# Patient Record
Sex: FEMALE | Race: WHITE | Employment: OTHER
[De-identification: names, ages, dates, MRNs, and addresses within clinical notes are randomized per-mention and may not be internally consistent; named-entity substitution may affect disease eponyms.]

---

## 2017-02-17 PROBLEM — G47.00 INSOMNIA: Status: ACTIVE | Noted: 2017-02-17

## 2017-02-17 PROBLEM — E78.9 DISORDER OF LIPID METABOLISM: Status: ACTIVE | Noted: 2017-02-17

## 2017-02-17 PROBLEM — M89.9 DISORDER OF BONE AND ARTICULAR CARTILAGE: Status: ACTIVE | Noted: 2017-02-17

## 2017-02-17 PROBLEM — M12.9 ARTHROPATHIA: Status: ACTIVE | Noted: 2017-02-17

## 2017-02-17 PROBLEM — R32 URINARY INCONTINENCE: Status: ACTIVE | Noted: 2017-02-17

## 2017-02-17 PROBLEM — I10 BP (HIGH BLOOD PRESSURE): Status: ACTIVE | Noted: 2017-02-17

## 2017-02-17 PROBLEM — M10.9 GOUTY ARTHROPATHY: Status: ACTIVE | Noted: 2017-02-17

## 2017-02-17 PROBLEM — I83.90 VARICOSE VEINS OF LOWER EXTREMITY: Status: ACTIVE | Noted: 2017-02-17

## 2017-02-17 PROBLEM — D75.89 INCREASED MCV: Status: ACTIVE | Noted: 2017-02-17

## 2017-02-17 PROBLEM — R39.15 URGENCY OF URINATION: Status: ACTIVE | Noted: 2017-02-17

## 2017-02-17 PROBLEM — M51.36 DDD (DEGENERATIVE DISC DISEASE), LUMBAR: Status: ACTIVE | Noted: 2017-02-17

## 2017-02-17 PROBLEM — Q15.9 GLAUCOMA ASSOCIATED WITH ANTERIOR SEGMENT ANOMALY: Status: ACTIVE | Noted: 2017-02-17

## 2017-02-17 PROBLEM — I10 BENIGN ESSENTIAL HTN: Status: ACTIVE | Noted: 2017-02-17

## 2017-02-17 PROBLEM — D64.9 ANEMIA: Status: ACTIVE | Noted: 2017-02-17

## 2017-02-17 PROBLEM — R73.01 ELEVATED FASTING BLOOD SUGAR: Status: ACTIVE | Noted: 2017-02-17

## 2017-02-17 PROBLEM — E03.9 HYPOACTIVE THYROID: Status: ACTIVE | Noted: 2017-02-17

## 2017-02-17 PROBLEM — H40.89 GLAUCOMA ASSOCIATED WITH ANTERIOR SEGMENT ANOMALY: Status: ACTIVE | Noted: 2017-02-17

## 2017-02-17 PROBLEM — E87.1 HYPONATREMIA: Status: ACTIVE | Noted: 2017-02-17

## 2017-02-17 PROBLEM — N95.1 MENOPAUSAL SYMPTOM: Status: ACTIVE | Noted: 2017-02-17

## 2017-02-17 PROBLEM — M10.9 GOUT: Status: ACTIVE | Noted: 2017-02-17

## 2017-02-17 PROBLEM — R06.81 APNEA: Status: ACTIVE | Noted: 2017-02-17

## 2017-02-17 PROBLEM — G47.33 OBSTRUCTIVE APNEA: Status: ACTIVE | Noted: 2017-02-17

## 2017-02-17 PROBLEM — M54.17 LUMBOSACRAL NEURITIS: Status: ACTIVE | Noted: 2017-02-17

## 2017-02-17 PROBLEM — N18.9 CHRONIC RENAL IMPAIRMENT: Status: ACTIVE | Noted: 2017-02-17

## 2017-02-17 PROBLEM — M19.049 ARTHRITIS, DEGENERATIVE, LOCALIZED, PRIMARY, HAND: Status: ACTIVE | Noted: 2017-02-17

## 2017-02-17 PROBLEM — M94.9 DISORDER OF BONE AND ARTICULAR CARTILAGE: Status: ACTIVE | Noted: 2017-02-17

## 2017-02-17 PROBLEM — D50.9 IRON DEFICIENCY ANEMIA: Status: ACTIVE | Noted: 2017-02-17

## 2017-02-17 PROBLEM — R60.9 EDEMA: Status: ACTIVE | Noted: 2017-02-17

## 2017-02-17 PROBLEM — M19.91 PRIMARY LOCALIZED OSTEOARTHRITIS: Status: ACTIVE | Noted: 2017-02-17

## 2017-06-06 ENCOUNTER — HOSPITAL ENCOUNTER (OUTPATIENT)
Dept: PHYSICAL THERAPY | Facility: CLINIC | Age: 77
Setting detail: THERAPIES SERIES
Discharge: HOME | End: 2017-06-06
Payer: MEDICARE

## 2017-06-06 PROCEDURE — G8979 MOBILITY GOAL STATUS: HCPCS

## 2017-06-06 PROCEDURE — 97110 THERAPEUTIC EXERCISES: CPT

## 2017-06-06 PROCEDURE — 97161 PT EVAL LOW COMPLEX 20 MIN: CPT

## 2017-06-06 PROCEDURE — G0283 ELEC STIM OTHER THAN WOUND: HCPCS

## 2017-06-06 PROCEDURE — G8978 MOBILITY CURRENT STATUS: HCPCS

## 2017-06-08 ENCOUNTER — HOSPITAL ENCOUNTER (OUTPATIENT)
Dept: PHYSICAL THERAPY | Facility: CLINIC | Age: 77
Setting detail: THERAPIES SERIES
Discharge: HOME | End: 2017-06-08
Payer: MEDICARE

## 2017-06-08 PROCEDURE — G0283 ELEC STIM OTHER THAN WOUND: HCPCS

## 2017-06-08 PROCEDURE — 97110 THERAPEUTIC EXERCISES: CPT

## 2017-06-13 ENCOUNTER — HOSPITAL ENCOUNTER (OUTPATIENT)
Dept: PHYSICAL THERAPY | Facility: CLINIC | Age: 77
Setting detail: THERAPIES SERIES
Discharge: HOME | End: 2017-06-13
Payer: MEDICARE

## 2017-06-13 PROCEDURE — G0283 ELEC STIM OTHER THAN WOUND: HCPCS

## 2017-06-13 PROCEDURE — 97161 PT EVAL LOW COMPLEX 20 MIN: CPT

## 2017-06-13 PROCEDURE — 97140 MANUAL THERAPY 1/> REGIONS: CPT

## 2017-06-13 PROCEDURE — 97110 THERAPEUTIC EXERCISES: CPT

## 2017-06-15 ENCOUNTER — HOSPITAL ENCOUNTER (OUTPATIENT)
Dept: PHYSICAL THERAPY | Facility: CLINIC | Age: 77
Setting detail: THERAPIES SERIES
Discharge: HOME | End: 2017-06-15
Payer: MEDICARE

## 2017-06-15 PROCEDURE — G0283 ELEC STIM OTHER THAN WOUND: HCPCS

## 2017-06-15 PROCEDURE — 97110 THERAPEUTIC EXERCISES: CPT

## 2017-06-15 PROCEDURE — 97140 MANUAL THERAPY 1/> REGIONS: CPT

## 2017-06-20 ENCOUNTER — HOSPITAL ENCOUNTER (OUTPATIENT)
Dept: PHYSICAL THERAPY | Facility: CLINIC | Age: 77
Setting detail: THERAPIES SERIES
Discharge: HOME | End: 2017-06-20
Payer: MEDICARE

## 2017-06-20 PROCEDURE — 97110 THERAPEUTIC EXERCISES: CPT

## 2017-06-20 PROCEDURE — 97140 MANUAL THERAPY 1/> REGIONS: CPT

## 2017-06-20 PROCEDURE — G0283 ELEC STIM OTHER THAN WOUND: HCPCS

## 2017-06-22 ENCOUNTER — HOSPITAL ENCOUNTER (OUTPATIENT)
Dept: PHYSICAL THERAPY | Facility: CLINIC | Age: 77
Setting detail: THERAPIES SERIES
Discharge: HOME | End: 2017-06-22
Payer: MEDICARE

## 2017-06-22 PROCEDURE — 97110 THERAPEUTIC EXERCISES: CPT

## 2017-06-22 PROCEDURE — G0283 ELEC STIM OTHER THAN WOUND: HCPCS

## 2017-06-22 PROCEDURE — 97140 MANUAL THERAPY 1/> REGIONS: CPT

## 2017-06-27 ENCOUNTER — HOSPITAL ENCOUNTER (OUTPATIENT)
Dept: PHYSICAL THERAPY | Facility: CLINIC | Age: 77
Setting detail: THERAPIES SERIES
Discharge: HOME | End: 2017-06-27
Payer: MEDICARE

## 2017-06-27 PROCEDURE — 97110 THERAPEUTIC EXERCISES: CPT

## 2017-06-27 PROCEDURE — G0283 ELEC STIM OTHER THAN WOUND: HCPCS

## 2017-06-27 PROCEDURE — 97140 MANUAL THERAPY 1/> REGIONS: CPT

## 2017-06-30 ENCOUNTER — HOSPITAL ENCOUNTER (OUTPATIENT)
Dept: PHYSICAL THERAPY | Facility: CLINIC | Age: 77
Setting detail: THERAPIES SERIES
Discharge: HOME | End: 2017-06-30
Payer: MEDICARE

## 2017-06-30 PROCEDURE — 97110 THERAPEUTIC EXERCISES: CPT

## 2017-06-30 PROCEDURE — G0283 ELEC STIM OTHER THAN WOUND: HCPCS

## 2017-06-30 PROCEDURE — 97140 MANUAL THERAPY 1/> REGIONS: CPT

## 2017-07-06 ENCOUNTER — HOSPITAL ENCOUNTER (OUTPATIENT)
Dept: PHYSICAL THERAPY | Facility: CLINIC | Age: 77
Setting detail: THERAPIES SERIES
Discharge: HOME | End: 2017-07-06
Payer: MEDICARE

## 2017-07-06 PROCEDURE — G0283 ELEC STIM OTHER THAN WOUND: HCPCS

## 2017-07-06 PROCEDURE — 97110 THERAPEUTIC EXERCISES: CPT

## 2017-07-06 PROCEDURE — 97140 MANUAL THERAPY 1/> REGIONS: CPT

## 2017-07-11 ENCOUNTER — HOSPITAL ENCOUNTER (OUTPATIENT)
Dept: PHYSICAL THERAPY | Facility: CLINIC | Age: 77
Setting detail: THERAPIES SERIES
Discharge: HOME | End: 2017-07-11
Payer: MEDICARE

## 2017-07-11 PROCEDURE — G8978 MOBILITY CURRENT STATUS: HCPCS

## 2017-07-11 PROCEDURE — G0283 ELEC STIM OTHER THAN WOUND: HCPCS

## 2017-07-11 PROCEDURE — 97140 MANUAL THERAPY 1/> REGIONS: CPT

## 2017-07-11 PROCEDURE — 97110 THERAPEUTIC EXERCISES: CPT

## 2017-07-11 PROCEDURE — G8979 MOBILITY GOAL STATUS: HCPCS

## 2017-07-18 ENCOUNTER — HOSPITAL ENCOUNTER (OUTPATIENT)
Dept: PHYSICAL THERAPY | Facility: CLINIC | Age: 77
Setting detail: THERAPIES SERIES
Discharge: HOME | End: 2017-07-18
Payer: MEDICARE

## 2017-07-18 PROCEDURE — 97110 THERAPEUTIC EXERCISES: CPT

## 2017-07-18 PROCEDURE — G0283 ELEC STIM OTHER THAN WOUND: HCPCS

## 2017-07-25 ENCOUNTER — HOSPITAL ENCOUNTER (OUTPATIENT)
Dept: PHYSICAL THERAPY | Facility: CLINIC | Age: 77
Setting detail: THERAPIES SERIES
Discharge: HOME | End: 2017-07-25
Payer: MEDICARE

## 2017-07-25 PROCEDURE — 97530 THERAPEUTIC ACTIVITIES: CPT

## 2017-07-25 PROCEDURE — G8979 MOBILITY GOAL STATUS: HCPCS

## 2017-07-25 PROCEDURE — 97110 THERAPEUTIC EXERCISES: CPT

## 2017-07-25 PROCEDURE — G8980 MOBILITY D/C STATUS: HCPCS

## 2017-08-08 ENCOUNTER — HOSPITAL ENCOUNTER (OUTPATIENT)
Age: 77
Discharge: HOME | End: 2017-08-08
Payer: MEDICARE

## 2017-08-08 LAB
ALBUMIN SERPL-MCNC: 4.3 G/DL (ref 3.5–5.2)
ALBUMIN/GLOBULIN RATIO: NORMAL (ref 1–2.5)
ALP BLD-CCNC: 83 U/L (ref 35–104)
ALT SERPL-CCNC: 25 U/L (ref 5–33)
ANION GAP SERPL CALCULATED.3IONS-SCNC: 13 MMOL/L (ref 9–17)
AST SERPL-CCNC: 31 U/L
BILIRUB SERPL-MCNC: 0.52 MG/DL (ref 0.3–1.2)
BILIRUBIN DIRECT: 0.12 MG/DL
BILIRUBIN, INDIRECT: 0.4 MG/DL (ref 0–1)
BUN BLDV-MCNC: 22 MG/DL (ref 8–23)
CHLORIDE BLD-SCNC: 102 MMOL/L (ref 98–107)
CHOLESTEROL, TOTAL: 145 MG/DL
CHOLESTEROL/HDL RATIO: 2.2
CHOLESTEROL/HDL RATIO: 2.2
CHOLESTEROL: 143 MG/DL
CO2: 27 MMOL/L (ref 20–31)
CREAT SERPL-MCNC: 0.7 MG/DL (ref 0.5–0.9)
GFR AFRICAN AMERICAN: >60 ML/MIN
GFR NON-AFRICAN AMERICAN: >60 ML/MIN
GFR SERPL CREATININE-BSD FRML MDRD: NORMAL ML/MIN/{1.73_M2}
GFR SERPL CREATININE-BSD FRML MDRD: NORMAL ML/MIN/{1.73_M2}
GLOBULIN: NORMAL G/DL (ref 1.5–3.8)
GLUCOSE BLD-MCNC: 82 MG/DL
HDLC SERPL-MCNC: 65 MG/DL
HDLC SERPL-MCNC: 65 MG/DL (ref 35–70)
LDL CHOLESTEROL CALCULATED: 72 MG/DL (ref 0–160)
LDL CHOLESTEROL: 70 MG/DL (ref 0–130)
POTASSIUM SERPL-SCNC: 4.7 MMOL/L (ref 3.7–5.3)
SODIUM BLD-SCNC: 142 MMOL/L (ref 135–144)
THYROXINE, FREE: 1.11 NG/DL (ref 0.93–1.7)
TOTAL PROTEIN: 7.1 G/DL (ref 6.4–8.3)
TRIGL SERPL-MCNC: 40 MG/DL
TRIGL SERPL-MCNC: 41 MG/DL
TSH SERPL DL<=0.05 MIU/L-ACNC: 3.52 MIU/L (ref 0.3–5)
URIC ACID: 4.4 MG/DL (ref 2.4–5.7)
VLDLC SERPL CALC-MCNC: NORMAL MG/DL
VLDLC SERPL CALC-MCNC: NORMAL MG/DL (ref 1–30)

## 2017-08-08 PROCEDURE — 84520 ASSAY OF UREA NITROGEN: CPT

## 2017-08-08 PROCEDURE — 80061 LIPID PANEL: CPT

## 2017-08-08 PROCEDURE — 84550 ASSAY OF BLOOD/URIC ACID: CPT

## 2017-08-08 PROCEDURE — 82565 ASSAY OF CREATININE: CPT

## 2017-08-08 PROCEDURE — 80051 ELECTROLYTE PANEL: CPT

## 2017-08-08 PROCEDURE — 84439 ASSAY OF FREE THYROXINE: CPT

## 2017-08-08 PROCEDURE — 84443 ASSAY THYROID STIM HORMONE: CPT

## 2017-08-08 PROCEDURE — 80076 HEPATIC FUNCTION PANEL: CPT

## 2018-06-20 ENCOUNTER — OFFICE VISIT (OUTPATIENT)
Dept: ORTHOPEDIC SURGERY | Age: 78
End: 2018-06-20
Payer: MEDICARE

## 2018-06-20 VITALS
WEIGHT: 203 LBS | DIASTOLIC BLOOD PRESSURE: 61 MMHG | SYSTOLIC BLOOD PRESSURE: 119 MMHG | BODY MASS INDEX: 33.82 KG/M2 | HEIGHT: 65 IN | HEART RATE: 66 BPM

## 2018-06-20 DIAGNOSIS — G89.29 CHRONIC PAIN OF RIGHT KNEE: Primary | ICD-10-CM

## 2018-06-20 DIAGNOSIS — M25.561 CHRONIC PAIN OF RIGHT KNEE: Primary | ICD-10-CM

## 2018-06-20 PROCEDURE — 1090F PRES/ABSN URINE INCON ASSESS: CPT | Performed by: ORTHOPAEDIC SURGERY

## 2018-06-20 PROCEDURE — 4040F PNEUMOC VAC/ADMIN/RCVD: CPT | Performed by: ORTHOPAEDIC SURGERY

## 2018-06-20 PROCEDURE — 99213 OFFICE O/P EST LOW 20 MIN: CPT | Performed by: ORTHOPAEDIC SURGERY

## 2018-06-20 PROCEDURE — 1036F TOBACCO NON-USER: CPT | Performed by: ORTHOPAEDIC SURGERY

## 2018-06-20 PROCEDURE — G8427 DOCREV CUR MEDS BY ELIG CLIN: HCPCS | Performed by: ORTHOPAEDIC SURGERY

## 2018-06-20 PROCEDURE — G8400 PT W/DXA NO RESULTS DOC: HCPCS | Performed by: ORTHOPAEDIC SURGERY

## 2018-06-20 PROCEDURE — 1123F ACP DISCUSS/DSCN MKR DOCD: CPT | Performed by: ORTHOPAEDIC SURGERY

## 2018-06-20 PROCEDURE — G8419 CALC BMI OUT NRM PARAM NOF/U: HCPCS | Performed by: ORTHOPAEDIC SURGERY

## 2018-06-20 RX ORDER — DIPHENHYDRAMINE HCL 25 MG
25 CAPSULE ORAL NIGHTLY PRN
COMMUNITY

## 2018-06-20 ASSESSMENT — KOOS JR
STANDING UPRIGHT: 1
RISING FROM SITTING: 3
HOW SEVERE IS YOUR KNEE STIFFNESS AFTER FIRST WAKING IN MORNING: 4
STRAIGHTENING KNEE FULLY: 1
GOING UP OR DOWN STAIRS: 2
BENDING TO THE FLOOR TO PICK UP OBJECT: 2
TWISING OR PIVOTING ON KNEE: 2

## 2018-06-20 ASSESSMENT — PROMIS GLOBAL HEALTH SCALE
IN GENERAL, WOULD YOU SAY YOUR HEALTH IS...[ON A SCALE OF 1 (POOR) TO 5 (EXCELLENT)]: 5
IN GENERAL, HOW WOULD YOU RATE YOUR PHYSICAL HEALTH [ON A SCALE OF 1 (POOR) TO 5 (EXCELLENT)]?: 3
TO WHAT EXTENT ARE YOU ABLE TO CARRY OUT YOUR EVERYDAY PHYSICAL ACTIVITIES SUCH AS WALKING, CLIMBING STAIRS, CARRYING GROCERIES, OR MOVING A CHAIR [ON A SCALE OF 1 (NOT AT ALL) TO 5 (COMPLETELY)]?: 3
IN GENERAL, WOULD YOU SAY YOUR QUALITY OF LIFE IS...[ON A SCALE OF 1 (POOR) TO 5 (EXCELLENT)]: 3
SUM OF RESPONSES TO QUESTIONS 2, 4, 5, & 10: 16
SUM OF RESPONSES TO QUESTIONS 3, 6, 7, & 8: 15
HOW IS THE PROMIS V1.1 BEING ADMINISTERED?: 0
IN THE PAST 7 DAYS, HOW WOULD YOU RATE YOUR PAIN ON AVERAGE [ON A SCALE FROM 0 (NO PAIN) TO 10 (WORST IMAGINABLE PAIN)]?: 7
IN THE PAST 7 DAYS, HOW OFTEN HAVE YOU BEEN BOTHERED BY EMOTIONAL PROBLEMS, SUCH AS FEELING ANXIOUS, DEPRESSED, OR IRRITABLE [ON A SCALE FROM 1 (NEVER) TO 5 (ALWAYS)]?: 3
IN GENERAL, PLEASE RATE HOW WELL YOU CARRY OUT YOUR USUAL SOCIAL ACTIVITIES (INCLUDES ACTIVITIES AT HOME, AT WORK, AND IN YOUR COMMUNITY, AND RESPONSIBILITIES AS A PARENT, CHILD, SPOUSE, EMPLOYEE, FRIEND, ETC) [ON A SCALE OF 1 (POOR) TO 5 (EXCELLENT)]?: 5
IN GENERAL, HOW WOULD YOU RATE YOUR MENTAL HEALTH, INCLUDING YOUR MOOD AND YOUR ABILITY TO THINK [ON A SCALE OF 1 (POOR) TO 5 (EXCELLENT)]?: 5
IN THE PAST 7 DAYS, HOW WOULD YOU RATE YOUR FATIGUE ON AVERAGE [ON A SCALE FROM 1 (NONE) TO 5 (VERY SEVERE)]?: 2
IN GENERAL, HOW WOULD YOU RATE YOUR SATISFACTION WITH YOUR SOCIAL ACTIVITIES AND RELATIONSHIPS [ON A SCALE OF 1 (POOR) TO 5 (EXCELLENT)]?: 5
WHO IS THE PERSON COMPLETING THE PROMIS V1.1 SURVEY?: 0

## 2018-06-29 ASSESSMENT — PROMIS GLOBAL HEALTH SCALE
HOW IS THE PROMIS V1.1 BEING ADMINISTERED?: 2
IN THE PAST 7 DAYS, HOW WOULD YOU RATE YOUR PAIN ON AVERAGE [ON A SCALE FROM 0 (NO PAIN) TO 10 (WORST IMAGINABLE PAIN)]?: 7
IN GENERAL, HOW WOULD YOU RATE YOUR PHYSICAL HEALTH [ON A SCALE OF 1 (POOR) TO 5 (EXCELLENT)]?: 4
IN THE PAST 7 DAYS, HOW WOULD YOU RATE YOUR FATIGUE ON AVERAGE [ON A SCALE FROM 1 (NONE) TO 5 (VERY SEVERE)]?: 5
WHO IS THE PERSON COMPLETING THE PROMIS V1.1 SURVEY?: 0
IN GENERAL, HOW WOULD YOU RATE YOUR SATISFACTION WITH YOUR SOCIAL ACTIVITIES AND RELATIONSHIPS [ON A SCALE OF 1 (POOR) TO 5 (EXCELLENT)]?: 3
IN GENERAL, WOULD YOU SAY YOUR QUALITY OF LIFE IS...[ON A SCALE OF 1 (POOR) TO 5 (EXCELLENT)]: 2
IN GENERAL, PLEASE RATE HOW WELL YOU CARRY OUT YOUR USUAL SOCIAL ACTIVITIES (INCLUDES ACTIVITIES AT HOME, AT WORK, AND IN YOUR COMMUNITY, AND RESPONSIBILITIES AS A PARENT, CHILD, SPOUSE, EMPLOYEE, FRIEND, ETC) [ON A SCALE OF 1 (POOR) TO 5 (EXCELLENT)]?: 4
IN GENERAL, WOULD YOU SAY YOUR HEALTH IS...[ON A SCALE OF 1 (POOR) TO 5 (EXCELLENT)]: 4
IN THE PAST 7 DAYS, HOW OFTEN HAVE YOU BEEN BOTHERED BY EMOTIONAL PROBLEMS, SUCH AS FEELING ANXIOUS, DEPRESSED, OR IRRITABLE [ON A SCALE FROM 1 (NEVER) TO 5 (ALWAYS)]?: 3
TO WHAT EXTENT ARE YOU ABLE TO CARRY OUT YOUR EVERYDAY PHYSICAL ACTIVITIES SUCH AS WALKING, CLIMBING STAIRS, CARRYING GROCERIES, OR MOVING A CHAIR [ON A SCALE OF 1 (NOT AT ALL) TO 5 (COMPLETELY)]?: 4
IN GENERAL, HOW WOULD YOU RATE YOUR MENTAL HEALTH, INCLUDING YOUR MOOD AND YOUR ABILITY TO THINK [ON A SCALE OF 1 (POOR) TO 5 (EXCELLENT)]?: 5

## 2018-06-29 ASSESSMENT — KOOS JR
STANDING UPRIGHT: 2
HOW SEVERE IS YOUR KNEE STIFFNESS AFTER FIRST WAKING IN MORNING: 2
STRAIGHTENING KNEE FULLY: 0
BENDING TO THE FLOOR TO PICK UP OBJECT: 0
GOING UP OR DOWN STAIRS: 2
RISING FROM SITTING: 3
TWISING OR PIVOTING ON KNEE: 3

## 2018-07-03 ENCOUNTER — HOSPITAL ENCOUNTER (OUTPATIENT)
Dept: PREADMISSION TESTING | Age: 78
Discharge: HOME | End: 2018-07-07
Payer: MEDICARE

## 2018-07-03 VITALS
SYSTOLIC BLOOD PRESSURE: 125 MMHG | RESPIRATION RATE: 16 BRPM | DIASTOLIC BLOOD PRESSURE: 56 MMHG | BODY MASS INDEX: 35.79 KG/M2 | OXYGEN SATURATION: 98 % | HEIGHT: 63 IN | WEIGHT: 202 LBS | TEMPERATURE: 97.8 F

## 2018-07-03 LAB
-: ABNORMAL
ABSOLUTE EOS #: 0.2 K/UL (ref 0–0.4)
ABSOLUTE IMMATURE GRANULOCYTE: ABNORMAL K/UL (ref 0–0.3)
ABSOLUTE LYMPH #: 2 K/UL (ref 1–4.8)
ABSOLUTE MONO #: 0.8 K/UL (ref 0.1–1.3)
AMORPHOUS: ABNORMAL
ANION GAP SERPL CALCULATED.3IONS-SCNC: 12 MMOL/L (ref 9–17)
BACTERIA: ABNORMAL
BASOPHILS # BLD: 1 % (ref 0–2)
BASOPHILS ABSOLUTE: 0.1 K/UL (ref 0–0.2)
BILIRUBIN URINE: NEGATIVE
BUN BLDV-MCNC: 20 MG/DL (ref 8–23)
BUN/CREAT BLD: NORMAL (ref 9–20)
CALCIUM SERPL-MCNC: 10.3 MG/DL (ref 8.6–10.4)
CASTS UA: ABNORMAL /LPF
CHLORIDE BLD-SCNC: 99 MMOL/L (ref 98–107)
CO2: 29 MMOL/L (ref 20–31)
COLOR: YELLOW
COMMENT UA: ABNORMAL
CREAT SERPL-MCNC: 0.79 MG/DL (ref 0.5–0.9)
CRYSTALS, UA: ABNORMAL /HPF
DIFFERENTIAL TYPE: ABNORMAL
EKG ATRIAL RATE: 163 BPM
EKG P AXIS: 81 DEGREES
EKG Q-T INTERVAL: 366 MS
EKG QRS DURATION: 88 MS
EKG QTC CALCULATION (BAZETT): 490 MS
EKG R AXIS: -13 DEGREES
EKG T AXIS: 67 DEGREES
EKG VENTRICULAR RATE: 108 BPM
EOSINOPHILS RELATIVE PERCENT: 3 % (ref 0–4)
EPITHELIAL CELLS UA: ABNORMAL /HPF
GFR AFRICAN AMERICAN: >60 ML/MIN
GFR NON-AFRICAN AMERICAN: >60 ML/MIN
GFR SERPL CREATININE-BSD FRML MDRD: NORMAL ML/MIN/{1.73_M2}
GFR SERPL CREATININE-BSD FRML MDRD: NORMAL ML/MIN/{1.73_M2}
GLUCOSE BLD-MCNC: 83 MG/DL (ref 70–99)
GLUCOSE URINE: NEGATIVE
HCT VFR BLD CALC: 40.4 % (ref 36–46)
HEMOGLOBIN: 13.5 G/DL (ref 12–16)
IMMATURE GRANULOCYTES: ABNORMAL %
KETONES, URINE: NEGATIVE
LEUKOCYTE ESTERASE, URINE: ABNORMAL
LYMPHOCYTES # BLD: 29 % (ref 24–44)
MCH RBC QN AUTO: 34.5 PG (ref 26–34)
MCHC RBC AUTO-ENTMCNC: 33.4 G/DL (ref 31–37)
MCV RBC AUTO: 103.4 FL (ref 80–100)
MONOCYTES # BLD: 11 % (ref 1–7)
MUCUS: ABNORMAL
NITRITE, URINE: NEGATIVE
NRBC AUTOMATED: ABNORMAL PER 100 WBC
OTHER OBSERVATIONS UA: ABNORMAL
PDW BLD-RTO: 14.4 % (ref 11.5–14.9)
PH UA: 6 (ref 5–8)
PLATELET # BLD: 214 K/UL (ref 150–450)
PLATELET ESTIMATE: ABNORMAL
PMV BLD AUTO: 8.3 FL (ref 6–12)
POTASSIUM SERPL-SCNC: 5.2 MMOL/L (ref 3.7–5.3)
PROTEIN UA: NEGATIVE
RBC # BLD: 3.9 M/UL (ref 4–5.2)
RBC # BLD: ABNORMAL 10*6/UL
RBC UA: ABNORMAL /HPF
RENAL EPITHELIAL, UA: ABNORMAL /HPF
SEG NEUTROPHILS: 56 % (ref 36–66)
SEGMENTED NEUTROPHILS ABSOLUTE COUNT: 3.7 K/UL (ref 1.3–9.1)
SODIUM BLD-SCNC: 140 MMOL/L (ref 135–144)
SPECIFIC GRAVITY UA: 1.02 (ref 1–1.03)
TRICHOMONAS: ABNORMAL
TURBIDITY: CLEAR
URINE HGB: NEGATIVE
UROBILINOGEN, URINE: NORMAL
WBC # BLD: 6.7 K/UL (ref 3.5–11)
WBC # BLD: ABNORMAL 10*3/UL
WBC UA: ABNORMAL /HPF
YEAST: ABNORMAL

## 2018-07-03 PROCEDURE — 80048 BASIC METABOLIC PNL TOTAL CA: CPT

## 2018-07-03 PROCEDURE — 93005 ELECTROCARDIOGRAM TRACING: CPT

## 2018-07-03 PROCEDURE — 87086 URINE CULTURE/COLONY COUNT: CPT

## 2018-07-03 PROCEDURE — 81001 URINALYSIS AUTO W/SCOPE: CPT

## 2018-07-03 PROCEDURE — 36415 COLL VENOUS BLD VENIPUNCTURE: CPT

## 2018-07-03 PROCEDURE — 87641 MR-STAPH DNA AMP PROBE: CPT

## 2018-07-03 PROCEDURE — 85025 COMPLETE CBC W/AUTO DIFF WBC: CPT

## 2018-07-03 RX ORDER — OXYBUTYNIN CHLORIDE 15 MG/1
15 TABLET, EXTENDED RELEASE ORAL DAILY
Status: ON HOLD | COMMUNITY
End: 2021-10-21

## 2018-07-03 RX ORDER — LISINOPRIL 5 MG/1
5 TABLET ORAL DAILY
COMMUNITY
End: 2019-01-18 | Stop reason: ALTCHOICE

## 2018-07-03 NOTE — H&P
HISTORY and [address]       NAME:  Sabrina Chua  MRN: [id number]   YOB: 1940   Date: 7/3/2018   Age: 69 y.o. Gender: female     COMPLAINT AND PRESENT HISTORY:     Sabrina Chua is a 69 y.o.  female, undergoing preadmission testing for right total knee arthroplasty. Patient complaining of pain in the right knee for the past few years, progressively worsening, more severe over past 2-3 months. Pain is described as an ache which occasionally can become sharp (8/10), without radiation. Pain is worse with all types of activity, worse with stairs. Pain alleviated with anti-inflammatories, steroid injections in the past, last injection only provided 2 weeks relief. The knee will occasionally buckle and give way under the patient. No recent falls or trauma. Patient denies weakness or numbness in right lower extremity. History of left TKA, no complications postoperatively. Patient otherwise feeling well, no recent fevers/chills, chest pain, shortness of breath. No history of venous thrombosis.      PAST MEDICAL HISTORY     Past Medical History:   Diagnosis Date    Arthritis     Asthma     Back pain     Caffeine use     2-3 cups coffee and 2-3 cups tea and 1-2 cans pop daily    Carpal tunnel syndrome     Constipation     Gout     History of blood transfusion     Hypertension     Obesity     Osteoarthritis     PONV (postoperative nausea and vomiting)     Sleep apnea     Thyroid disease     HYPOTHYROID    Urinary incontinence     Urine incontinence     Wears hearing aid     BILATERAL     Pt denies any history of:  Diabetes Mellitus, Hyperlipidemia, CAD, Stroke/TIA, COPD, GERD/PUD, Kidney Disease, Cancer, Seizures, Hepatitis, Tuberculosis    SURGICAL HISTORY       Past Surgical History:   Procedure Laterality Date    CARPAL TUNNEL RELEASE Bilateral     CATARACT REMOVAL Bilateral     COLONOSCOPY      CYSTOSCOPY      and dilation    FOOT SURGERY Right     HYSTERECTOMY      JOINT REPLACEMENT Left 2011    TKA    KNEE ARTHROSCOPY Left     KNEE SURGERY Left     TONSILLECTOMY       FAMILY HISTORY       Family History   Problem Relation Age of Onset    Heart Failure Mother     Heart Disease Father      SOCIAL HISTORY       Social History     Social History    Marital status:      Spouse name: N/A    Number of children: 2    Years of education: N/A     Occupational History    Retired      Social History Main Topics    Smoking status: Former Smoker    Smokeless tobacco: Never Used    Alcohol use Yes      Comment: rare    Drug use: No    Sexual activity: No     Other Topics Concern    None     Social History Narrative    None     REVIEW OF SYSTEMS      Allergies   Allergen Reactions    Povidone-Iodine Rash    Hydrochlorothiazide     Meperidine Itching    Naproxen Hives     Current Outpatient Prescriptions on File Prior to Encounter   Medication Sig Dispense Refill    Cinnamon 500 MG TABS Take by mouth daily      diphenhydrAMINE (BENADRYL) 25 MG capsule Take 25 mg by mouth nightly      levothyroxine (SYNTHROID) 88 MCG tablet Take 88 mcg by mouth Daily      allopurinol (ZYLOPRIM) 300 MG tablet Take 300 mg by mouth daily      celecoxib (CELEBREX) 100 MG capsule Take 100 mg by mouth daily       Polyethylene Glycol (PEG EX) Apply topically      Coenzyme Q10 (COQ10) 100 MG CAPS Take by mouth daily       Resveratrol 100 MG CAPS Take by mouth      Ascorbic Acid (VITAMIN C) 500 MG/5ML LIQD Take by mouth      Cholecalciferol (VITAMIN D3) 2000 UNITS CAPS Take by mouth      aspirin 81 MG tablet Take 81 mg by mouth every other day Patient takes Aspirin every other night at bedtime      Omega-3 Fatty Acids (OMEGA-3 FISH OIL) 1200 MG CAPS Take by mouth      vitamin E 400 UNIT capsule Take 400 Units by mouth daily      B Complex-C (SUPER B COMPLEX/VITAMIN C PO) Take by mouth      Calcium Carb-Cholecalciferol (CALCIUM 600 + D PO) Take by mouth      Multiple Vitamin (MULTI VITAMIN DAILY PO) Take by mouth      NIACINAMIDE PO Take by mouth      traMADol (ULTRAM) 50 MG tablet Take 50 mg by mouth every 6 hours as needed for Pain      Glucosamine-Chondroit-Vit C-Mn (GLUCOSAMINE 1500 COMPLEX PO) Take by mouth      TURMERIC PO Take by mouth      vitamin B-12 (CYANOCOBALAMIN) 1000 MCG tablet Take 1,000 mcg by mouth daily      mupirocin (BACTROBAN) 2 % ointment Place small amount in each nares (nostril) as directed, twice a day, for 10 days prior to surgery. 1 Tube 0    ketoconazole (NIZORAL) 2 % shampoo Apply topically daily as needed for Itching Apply topically daily as needed. No current facility-administered medications on file prior to encounter. General health:  Patient feeling well. No fever or chills. Skin:  No itching, redness or rash. HEENT:  No headache, rhinorrhea, nasal congestion or sore throat. Hearing aids. Reading glasses. Neck:  No pain, stiffness or masses. Cardiovascular/Respiratory system:  No chest pain, palpitations, cough or shortness of breath. Gastrointestinal tract: No abdominal pain, nausea, vomiting, diarrhea or constipation. Genitourinary:  No dysuria, hesitancy, urgency, frequency or discoloration of urine. Urge incontinence. Locomotor:  See HPI. Neuropsychiatric:  No referable complaints. GENERAL PHYSICAL EXAM:     Vitals: BP (!) 125/56   Temp 97.8 °F (36.6 °C) (Oral)   Resp 16   Ht 5' 3\" (1.6 m)   Wt 202 lb (91.6 kg)   LMP  (Approximate)   SpO2 98%   BMI 35.78 kg/m²  Body mass index is 35.78 kg/m². GENERAL APPEARANCE: Rubén Ashby is a 69 y.o.  female, moderately obese, nourished, conscious, alert. Does not appear to be in any distress or pain at this time. SKIN:  Normal temperature, turgor and texture. No cyanosis or jaundice. HEAD:  Normocephalic, atraumatic. Linford Solo EYES:  Pupils equal, reactive to light and accomodation. Conjunctiva clear, no pallor. THROAT:  Mucous membranes moist. No tonsillar erythema or exudates. NECK:  No stiffness, trachea central.  No palpable masses. CHEST:  Symmetrical and equal on expansion. HEART:  Regular rate, rhythm. No murmurs. LUNGS:  Equal on expansion. Clear to auscultation with no adventitious sounds. ABDOMEN:  Moderately obese. Soft on palpation. No localized tenderness, guarding or rigidity. No palpable organomegaly. LYMPHATICS:  No palpable cervical lymphadenopathy. LOCOMOTOR, BACK AND SPINE:  No tenderness or deformities. No flank tenderness. Walker for stability. EXTREMITIES:  Lower extremities are symmetrical with minimal nonpitting pretibial/pedal edema. No discoloration or ulcerations. No warmth, tenderness, erythema noted in lower legs bilaterally. Significant crepitus noted with flexion/extension of the right knee. Range of motion is normal. No motor or sensory deficit appreciated. Peripheral pulses 2+ bilaterally. NEUROLOGIC:  The patient is conscious, alert, oriented. No apparent focal sensory or motor deficits. Cranial nerve exam reveals no deficits.                                                                        PROVISIONAL DIAGNOSES / SURGERY:      DJD, Right Knee  Total Knee Arthroplasty, Right    Patient Active Problem List    Diagnosis Date Noted    Urgency of urination 02/17/2017    Anemia 02/17/2017    Apnea 02/17/2017    Arthropathia 02/17/2017    Benign essential HTN 02/17/2017    Chronic renal impairment 02/17/2017    DDD (degenerative disc disease), lumbar 02/17/2017    Disorder of bone and articular cartilage 02/17/2017    Disorder of lipid metabolism 02/17/2017    Edema 02/17/2017    Glaucoma associated with anterior segment anomaly 02/17/2017    Gout 02/17/2017    Gouty arthropathy 02/17/2017    BP (high blood pressure) 02/17/2017    Hyponatremia 02/17/2017    Hypoactive thyroid 02/17/2017    Elevated fasting blood sugar 02/17/2017    Insomnia 02/17/2017    Iron deficiency anemia 02/17/2017    Primary localized osteoarthritis 02/17/2017    Arthritis, degenerative, localized, primary, hand 02/17/2017    Lumbosacral neuritis 02/17/2017    Increased MCV 02/17/2017    Menopausal symptom 02/17/2017    Obstructive apnea 02/17/2017    Urinary incontinence 02/17/2017    Varicose veins of lower extremity 02/17/2017    Enthesopathy of hip 12/11/2009    Shortness of breath 04/21/2009    Essential (primary) hypertension 02/17/2009    Inflammation of sacroiliac joint ([address] 01/23/2008    Osteoporosis 09/14/2007    Arthritis of knee, degenerative 07/26/2006    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

## 2018-07-03 NOTE — FLOWSHEET NOTE
Reviewed patient's medical history and EKG with Dr. Arty Paget. Cardiac clearance requested per Dr. Arty Paget.

## 2018-07-04 ENCOUNTER — ANESTHESIA EVENT (OUTPATIENT)
Dept: OPERATING ROOM | Age: 78
DRG: 470 | End: 2018-07-04
Payer: MEDICARE

## 2018-07-04 LAB
CULTURE: NO GROWTH
Lab: NORMAL
MRSA, DNA, NASAL: NORMAL
SPECIMEN DESCRIPTION: NORMAL
SPECIMEN DESCRIPTION: NORMAL
STATUS: NORMAL

## 2018-07-04 RX ORDER — SODIUM CHLORIDE 0.9 % (FLUSH) 0.9 %
10 SYRINGE (ML) INJECTION EVERY 12 HOURS SCHEDULED
Status: CANCELLED | OUTPATIENT
Start: 2018-07-04

## 2018-07-04 RX ORDER — SODIUM CHLORIDE, SODIUM LACTATE, POTASSIUM CHLORIDE, CALCIUM CHLORIDE 600; 310; 30; 20 MG/100ML; MG/100ML; MG/100ML; MG/100ML
INJECTION, SOLUTION INTRAVENOUS CONTINUOUS
Status: CANCELLED | OUTPATIENT
Start: 2018-07-04

## 2018-07-04 RX ORDER — SCOLOPAMINE TRANSDERMAL SYSTEM 1 MG/1
1 PATCH, EXTENDED RELEASE TRANSDERMAL
Status: CANCELLED | OUTPATIENT
Start: 2018-07-04 | End: 2018-07-07

## 2018-07-04 RX ORDER — SODIUM CHLORIDE 0.9 % (FLUSH) 0.9 %
10 SYRINGE (ML) INJECTION PRN
Status: CANCELLED | OUTPATIENT
Start: 2018-07-04

## 2018-07-04 RX ORDER — LIDOCAINE HYDROCHLORIDE 10 MG/ML
1 INJECTION, SOLUTION EPIDURAL; INFILTRATION; INTRACAUDAL; PERINEURAL
Status: CANCELLED | OUTPATIENT
Start: 2018-07-04 | End: 2018-07-04

## 2018-07-17 ENCOUNTER — ANESTHESIA (OUTPATIENT)
Dept: OPERATING ROOM | Age: 78
DRG: 470 | End: 2018-07-17
Payer: MEDICARE

## 2018-07-17 ENCOUNTER — APPOINTMENT (OUTPATIENT)
Dept: GENERAL RADIOLOGY | Age: 78
DRG: 470 | End: 2018-07-17
Attending: ORTHOPAEDIC SURGERY
Payer: MEDICARE

## 2018-07-17 ENCOUNTER — HOSPITAL ENCOUNTER (INPATIENT)
Age: 78
LOS: 1 days | Discharge: NURSING, REHAB OR LONG-TERM CARE | DRG: 470 | End: 2018-07-18
Attending: ORTHOPAEDIC SURGERY | Admitting: ORTHOPAEDIC SURGERY
Payer: MEDICARE

## 2018-07-17 VITALS — SYSTOLIC BLOOD PRESSURE: 162 MMHG | DIASTOLIC BLOOD PRESSURE: 77 MMHG | OXYGEN SATURATION: 97 % | TEMPERATURE: 96.1 F

## 2018-07-17 DIAGNOSIS — M17.11 PRIMARY OSTEOARTHRITIS OF RIGHT KNEE: Primary | ICD-10-CM

## 2018-07-17 PROCEDURE — 6360000002 HC RX W HCPCS: Performed by: ORTHOPAEDIC SURGERY

## 2018-07-17 PROCEDURE — 2720000010 HC SURG SUPPLY STERILE: Performed by: ORTHOPAEDIC SURGERY

## 2018-07-17 PROCEDURE — 27447 TOTAL KNEE ARTHROPLASTY: CPT | Performed by: ORTHOPAEDIC SURGERY

## 2018-07-17 PROCEDURE — 3700000000 HC ANESTHESIA ATTENDED CARE: Performed by: ORTHOPAEDIC SURGERY

## 2018-07-17 PROCEDURE — 97161 PT EVAL LOW COMPLEX 20 MIN: CPT

## 2018-07-17 PROCEDURE — 7100000000 HC PACU RECOVERY - FIRST 15 MIN: Performed by: ORTHOPAEDIC SURGERY

## 2018-07-17 PROCEDURE — 0SRC0J9 REPLACEMENT OF RIGHT KNEE JOINT WITH SYNTHETIC SUBSTITUTE, CEMENTED, OPEN APPROACH: ICD-10-PCS | Performed by: ORTHOPAEDIC SURGERY

## 2018-07-17 PROCEDURE — 6360000002 HC RX W HCPCS: Performed by: ANESTHESIOLOGY

## 2018-07-17 PROCEDURE — 94664 DEMO&/EVAL PT USE INHALER: CPT

## 2018-07-17 PROCEDURE — G8979 MOBILITY GOAL STATUS: HCPCS

## 2018-07-17 PROCEDURE — C1713 ANCHOR/SCREW BN/BN,TIS/BN: HCPCS | Performed by: ORTHOPAEDIC SURGERY

## 2018-07-17 PROCEDURE — 97530 THERAPEUTIC ACTIVITIES: CPT

## 2018-07-17 PROCEDURE — 2500000003 HC RX 250 WO HCPCS: Performed by: ORTHOPAEDIC SURGERY

## 2018-07-17 PROCEDURE — G8978 MOBILITY CURRENT STATUS: HCPCS

## 2018-07-17 PROCEDURE — 97165 OT EVAL LOW COMPLEX 30 MIN: CPT

## 2018-07-17 PROCEDURE — 3E0T3BZ INTRODUCTION OF ANESTHETIC AGENT INTO PERIPHERAL NERVES AND PLEXI, PERCUTANEOUS APPROACH: ICD-10-PCS | Performed by: ANESTHESIOLOGY

## 2018-07-17 PROCEDURE — 7100000001 HC PACU RECOVERY - ADDTL 15 MIN: Performed by: ORTHOPAEDIC SURGERY

## 2018-07-17 PROCEDURE — 3600000014 HC SURGERY LEVEL 4 ADDTL 15MIN: Performed by: ORTHOPAEDIC SURGERY

## 2018-07-17 PROCEDURE — 3600000004 HC SURGERY LEVEL 4 BASE: Performed by: ORTHOPAEDIC SURGERY

## 2018-07-17 PROCEDURE — 2500000003 HC RX 250 WO HCPCS: Performed by: ANESTHESIOLOGY

## 2018-07-17 PROCEDURE — 1200000000 HC SEMI PRIVATE

## 2018-07-17 PROCEDURE — 6370000000 HC RX 637 (ALT 250 FOR IP): Performed by: ORTHOPAEDIC SURGERY

## 2018-07-17 PROCEDURE — 94640 AIRWAY INHALATION TREATMENT: CPT

## 2018-07-17 PROCEDURE — 2580000003 HC RX 258: Performed by: ANESTHESIOLOGY

## 2018-07-17 PROCEDURE — C1776 JOINT DEVICE (IMPLANTABLE): HCPCS | Performed by: ORTHOPAEDIC SURGERY

## 2018-07-17 PROCEDURE — 2580000003 HC RX 258: Performed by: ORTHOPAEDIC SURGERY

## 2018-07-17 PROCEDURE — 76942 ECHO GUIDE FOR BIOPSY: CPT | Performed by: ANESTHESIOLOGY

## 2018-07-17 PROCEDURE — 73560 X-RAY EXAM OF KNEE 1 OR 2: CPT

## 2018-07-17 PROCEDURE — 6360000002 HC RX W HCPCS

## 2018-07-17 PROCEDURE — 2709999900 HC NON-CHARGEABLE SUPPLY: Performed by: ORTHOPAEDIC SURGERY

## 2018-07-17 PROCEDURE — 6370000000 HC RX 637 (ALT 250 FOR IP): Performed by: INTERNAL MEDICINE

## 2018-07-17 PROCEDURE — 97535 SELF CARE MNGMENT TRAINING: CPT

## 2018-07-17 PROCEDURE — 3700000001 HC ADD 15 MINUTES (ANESTHESIA): Performed by: ORTHOPAEDIC SURGERY

## 2018-07-17 DEVICE — TRAY TIB L71MM KNEE CO CHROM FIN MOD INTLOK VANGUARD: Type: IMPLANTABLE DEVICE | Site: KNEE | Status: FUNCTIONAL

## 2018-07-17 DEVICE — IMPLANTABLE DEVICE: Type: IMPLANTABLE DEVICE | Site: KNEE | Status: FUNCTIONAL

## 2018-07-17 DEVICE — COMPONENT PAT DIA34MM THK7.8MM THN KNEE POLY 3 PEG SER A: Type: IMPLANTABLE DEVICE | Site: KNEE | Status: FUNCTIONAL

## 2018-07-17 DEVICE — DISCONTINUED USE 416978 CEMENT PALACOS R SING DOSE 40GR: Type: IMPLANTABLE DEVICE | Site: KNEE | Status: FUNCTIONAL

## 2018-07-17 DEVICE — BEARING TIB L71MM THK10MM KNEE ARCM ANT STBL MOD COMPLT: Type: IMPLANTABLE DEVICE | Site: KNEE | Status: FUNCTIONAL

## 2018-07-17 RX ORDER — HYDROCODONE BITARTRATE AND ACETAMINOPHEN 5; 325 MG/1; MG/1
1 TABLET ORAL PRN
Status: DISCONTINUED | OUTPATIENT
Start: 2018-07-17 | End: 2018-07-17 | Stop reason: HOSPADM

## 2018-07-17 RX ORDER — FENTANYL CITRATE 50 UG/ML
INJECTION, SOLUTION INTRAMUSCULAR; INTRAVENOUS PRN
Status: DISCONTINUED | OUTPATIENT
Start: 2018-07-17 | End: 2018-07-17 | Stop reason: SDUPTHER

## 2018-07-17 RX ORDER — BUPIVACAINE HYDROCHLORIDE 2.5 MG/ML
INJECTION, SOLUTION EPIDURAL; INFILTRATION; INTRACAUDAL PRN
Status: DISCONTINUED | OUTPATIENT
Start: 2018-07-17 | End: 2018-07-17 | Stop reason: SDUPTHER

## 2018-07-17 RX ORDER — LEVOTHYROXINE SODIUM 88 UG/1
88 TABLET ORAL DAILY
Status: DISCONTINUED | OUTPATIENT
Start: 2018-07-17 | End: 2018-07-18 | Stop reason: HOSPADM

## 2018-07-17 RX ORDER — DEXAMETHASONE SODIUM PHOSPHATE 10 MG/ML
10 INJECTION INTRAMUSCULAR; INTRAVENOUS ONCE
Status: COMPLETED | OUTPATIENT
Start: 2018-07-17 | End: 2018-07-17

## 2018-07-17 RX ORDER — GABAPENTIN 300 MG/1
300 CAPSULE ORAL ONCE
Status: COMPLETED | OUTPATIENT
Start: 2018-07-17 | End: 2018-07-17

## 2018-07-17 RX ORDER — ONDANSETRON 2 MG/ML
INJECTION INTRAMUSCULAR; INTRAVENOUS PRN
Status: DISCONTINUED | OUTPATIENT
Start: 2018-07-17 | End: 2018-07-17 | Stop reason: SDUPTHER

## 2018-07-17 RX ORDER — ALLOPURINOL 300 MG/1
300 TABLET ORAL NIGHTLY
Status: DISCONTINUED | OUTPATIENT
Start: 2018-07-17 | End: 2018-07-18 | Stop reason: HOSPADM

## 2018-07-17 RX ORDER — SODIUM CHLORIDE 0.9 % (FLUSH) 0.9 %
10 SYRINGE (ML) INJECTION PRN
Status: DISCONTINUED | OUTPATIENT
Start: 2018-07-17 | End: 2018-07-18 | Stop reason: HOSPADM

## 2018-07-17 RX ORDER — MIDAZOLAM HYDROCHLORIDE 1 MG/ML
INJECTION INTRAMUSCULAR; INTRAVENOUS PRN
Status: DISCONTINUED | OUTPATIENT
Start: 2018-07-17 | End: 2018-07-17 | Stop reason: SDUPTHER

## 2018-07-17 RX ORDER — FENTANYL CITRATE 50 UG/ML
25 INJECTION, SOLUTION INTRAMUSCULAR; INTRAVENOUS EVERY 5 MIN PRN
Status: DISCONTINUED | OUTPATIENT
Start: 2018-07-17 | End: 2018-07-17 | Stop reason: HOSPADM

## 2018-07-17 RX ORDER — SCOLOPAMINE TRANSDERMAL SYSTEM 1 MG/1
1 PATCH, EXTENDED RELEASE TRANSDERMAL ONCE
Status: DISCONTINUED | OUTPATIENT
Start: 2018-07-17 | End: 2018-07-18 | Stop reason: HOSPADM

## 2018-07-17 RX ORDER — ALBUTEROL SULFATE 2.5 MG/3ML
SOLUTION RESPIRATORY (INHALATION)
Status: COMPLETED
Start: 2018-07-17 | End: 2018-07-17

## 2018-07-17 RX ORDER — TRANEXAMIC ACID 100 MG/ML
INJECTION, SOLUTION INTRAVENOUS PRN
Status: DISCONTINUED | OUTPATIENT
Start: 2018-07-17 | End: 2018-07-17 | Stop reason: SDUPTHER

## 2018-07-17 RX ORDER — OXYCODONE HYDROCHLORIDE 5 MG/1
10 TABLET ORAL EVERY 4 HOURS PRN
Status: DISCONTINUED | OUTPATIENT
Start: 2018-07-17 | End: 2018-07-18 | Stop reason: HOSPADM

## 2018-07-17 RX ORDER — CALCIUM CHLORIDE 100 MG/ML
INJECTION INTRAVENOUS; INTRAVENTRICULAR PRN
Status: DISCONTINUED | OUTPATIENT
Start: 2018-07-17 | End: 2018-07-17 | Stop reason: HOSPADM

## 2018-07-17 RX ORDER — SODIUM CHLORIDE 0.9 % (FLUSH) 0.9 %
10 SYRINGE (ML) INJECTION EVERY 12 HOURS SCHEDULED
Status: DISCONTINUED | OUTPATIENT
Start: 2018-07-17 | End: 2018-07-18 | Stop reason: HOSPADM

## 2018-07-17 RX ORDER — CEFAZOLIN SODIUM 1 G/3ML
INJECTION, POWDER, FOR SOLUTION INTRAMUSCULAR; INTRAVENOUS
Status: DISPENSED
Start: 2018-07-17 | End: 2018-07-17

## 2018-07-17 RX ORDER — SODIUM CHLORIDE 0.9 % (FLUSH) 0.9 %
10 SYRINGE (ML) INJECTION PRN
Status: DISCONTINUED | OUTPATIENT
Start: 2018-07-17 | End: 2018-07-17 | Stop reason: HOSPADM

## 2018-07-17 RX ORDER — LANOLIN ALCOHOL/MO/W.PET/CERES
1000 CREAM (GRAM) TOPICAL DAILY
Status: DISCONTINUED | OUTPATIENT
Start: 2018-07-17 | End: 2018-07-17 | Stop reason: SDUPTHER

## 2018-07-17 RX ORDER — ACETAMINOPHEN 325 MG/1
650 TABLET ORAL ONCE
Status: COMPLETED | OUTPATIENT
Start: 2018-07-17 | End: 2018-07-17

## 2018-07-17 RX ORDER — LISINOPRIL 5 MG/1
5 TABLET ORAL DAILY
Status: DISCONTINUED | OUTPATIENT
Start: 2018-07-17 | End: 2018-07-17 | Stop reason: SDUPTHER

## 2018-07-17 RX ORDER — GABAPENTIN 600 MG/1
300 TABLET ORAL ONCE
Status: DISCONTINUED | OUTPATIENT
Start: 2018-07-17 | End: 2018-07-17 | Stop reason: CLARIF

## 2018-07-17 RX ORDER — SODIUM CHLORIDE 0.9 % (FLUSH) 0.9 %
10 SYRINGE (ML) INJECTION EVERY 12 HOURS SCHEDULED
Status: DISCONTINUED | OUTPATIENT
Start: 2018-07-17 | End: 2018-07-17 | Stop reason: HOSPADM

## 2018-07-17 RX ORDER — ALLOPURINOL 300 MG/1
300 TABLET ORAL DAILY
Status: DISCONTINUED | OUTPATIENT
Start: 2018-07-17 | End: 2018-07-17 | Stop reason: SDUPTHER

## 2018-07-17 RX ORDER — LIDOCAINE HYDROCHLORIDE 10 MG/ML
1 INJECTION, SOLUTION EPIDURAL; INFILTRATION; INTRACAUDAL; PERINEURAL
Status: DISCONTINUED | OUTPATIENT
Start: 2018-07-17 | End: 2018-07-17 | Stop reason: HOSPADM

## 2018-07-17 RX ORDER — LANOLIN ALCOHOL/MO/W.PET/CERES
1000 CREAM (GRAM) TOPICAL NIGHTLY
Status: DISCONTINUED | OUTPATIENT
Start: 2018-07-17 | End: 2018-07-18

## 2018-07-17 RX ORDER — ROCURONIUM BROMIDE 10 MG/ML
INJECTION, SOLUTION INTRAVENOUS PRN
Status: DISCONTINUED | OUTPATIENT
Start: 2018-07-17 | End: 2018-07-17 | Stop reason: SDUPTHER

## 2018-07-17 RX ORDER — PROPOFOL 10 MG/ML
INJECTION, EMULSION INTRAVENOUS PRN
Status: DISCONTINUED | OUTPATIENT
Start: 2018-07-17 | End: 2018-07-17 | Stop reason: SDUPTHER

## 2018-07-17 RX ORDER — MORPHINE SULFATE 2 MG/ML
2 INJECTION, SOLUTION INTRAMUSCULAR; INTRAVENOUS EVERY 5 MIN PRN
Status: DISCONTINUED | OUTPATIENT
Start: 2018-07-17 | End: 2018-07-17 | Stop reason: HOSPADM

## 2018-07-17 RX ORDER — PROMETHAZINE HYDROCHLORIDE 25 MG/ML
6.25 INJECTION, SOLUTION INTRAMUSCULAR; INTRAVENOUS
Status: DISCONTINUED | OUTPATIENT
Start: 2018-07-17 | End: 2018-07-17 | Stop reason: HOSPADM

## 2018-07-17 RX ORDER — SCOLOPAMINE TRANSDERMAL SYSTEM 1 MG/1
1 PATCH, EXTENDED RELEASE TRANSDERMAL
Status: DISCONTINUED | OUTPATIENT
Start: 2018-07-17 | End: 2018-07-17 | Stop reason: HOSPADM

## 2018-07-17 RX ORDER — ONDANSETRON 2 MG/ML
4 INJECTION INTRAMUSCULAR; INTRAVENOUS EVERY 6 HOURS PRN
Status: DISCONTINUED | OUTPATIENT
Start: 2018-07-17 | End: 2018-07-18 | Stop reason: HOSPADM

## 2018-07-17 RX ORDER — DIPHENHYDRAMINE HYDROCHLORIDE 50 MG/ML
12.5 INJECTION INTRAMUSCULAR; INTRAVENOUS
Status: DISCONTINUED | OUTPATIENT
Start: 2018-07-17 | End: 2018-07-17 | Stop reason: HOSPADM

## 2018-07-17 RX ORDER — OXYCODONE HYDROCHLORIDE 5 MG/1
5 TABLET ORAL EVERY 4 HOURS PRN
Status: DISCONTINUED | OUTPATIENT
Start: 2018-07-17 | End: 2018-07-18 | Stop reason: HOSPADM

## 2018-07-17 RX ORDER — HYDROCODONE BITARTRATE AND ACETAMINOPHEN 5; 325 MG/1; MG/1
2 TABLET ORAL PRN
Qty: 50 TABLET | Refills: 0 | Status: SHIPPED | OUTPATIENT
Start: 2018-07-17 | End: 2018-07-24

## 2018-07-17 RX ORDER — 0.9 % SODIUM CHLORIDE 0.9 %
500 INTRAVENOUS SOLUTION INTRAVENOUS
Status: DISCONTINUED | OUTPATIENT
Start: 2018-07-17 | End: 2018-07-17 | Stop reason: HOSPADM

## 2018-07-17 RX ORDER — DOCUSATE SODIUM 100 MG/1
100 CAPSULE, LIQUID FILLED ORAL 2 TIMES DAILY
Status: DISCONTINUED | OUTPATIENT
Start: 2018-07-17 | End: 2018-07-18 | Stop reason: HOSPADM

## 2018-07-17 RX ORDER — LISINOPRIL 5 MG/1
5 TABLET ORAL NIGHTLY
Status: DISCONTINUED | OUTPATIENT
Start: 2018-07-17 | End: 2018-07-18

## 2018-07-17 RX ORDER — SODIUM CHLORIDE, SODIUM LACTATE, POTASSIUM CHLORIDE, CALCIUM CHLORIDE 600; 310; 30; 20 MG/100ML; MG/100ML; MG/100ML; MG/100ML
INJECTION, SOLUTION INTRAVENOUS CONTINUOUS
Status: DISCONTINUED | OUTPATIENT
Start: 2018-07-17 | End: 2018-07-18 | Stop reason: HOSPADM

## 2018-07-17 RX ORDER — HYDRALAZINE HYDROCHLORIDE 20 MG/ML
5 INJECTION INTRAMUSCULAR; INTRAVENOUS EVERY 10 MIN PRN
Status: DISCONTINUED | OUTPATIENT
Start: 2018-07-17 | End: 2018-07-17 | Stop reason: HOSPADM

## 2018-07-17 RX ORDER — ACETAMINOPHEN 500 MG
1000 TABLET ORAL EVERY 8 HOURS SCHEDULED
Status: DISCONTINUED | OUTPATIENT
Start: 2018-07-17 | End: 2018-07-18 | Stop reason: HOSPADM

## 2018-07-17 RX ORDER — HYDROCODONE BITARTRATE AND ACETAMINOPHEN 5; 325 MG/1; MG/1
2 TABLET ORAL PRN
Status: DISCONTINUED | OUTPATIENT
Start: 2018-07-17 | End: 2018-07-17 | Stop reason: HOSPADM

## 2018-07-17 RX ORDER — EPHEDRINE SULFATE 50 MG/ML
INJECTION, SOLUTION INTRAVENOUS PRN
Status: DISCONTINUED | OUTPATIENT
Start: 2018-07-17 | End: 2018-07-17 | Stop reason: SDUPTHER

## 2018-07-17 RX ORDER — KETOROLAC TROMETHAMINE 30 MG/ML
15 INJECTION, SOLUTION INTRAMUSCULAR; INTRAVENOUS EVERY 8 HOURS SCHEDULED
Status: DISCONTINUED | OUTPATIENT
Start: 2018-07-17 | End: 2018-07-18 | Stop reason: HOSPADM

## 2018-07-17 RX ADMIN — Medication 750 ML: at 12:09

## 2018-07-17 RX ADMIN — Medication 2 G: at 18:39

## 2018-07-17 RX ADMIN — TRANEXAMIC ACID 1000 MG: 100 INJECTION, SOLUTION INTRAVENOUS at 10:14

## 2018-07-17 RX ADMIN — EPHEDRINE SULFATE 10 MG: 50 INJECTION INTRAMUSCULAR; INTRAVENOUS; SUBCUTANEOUS at 10:20

## 2018-07-17 RX ADMIN — ROCURONIUM BROMIDE 20 MG: 10 INJECTION INTRAVENOUS at 10:06

## 2018-07-17 RX ADMIN — ACETAMINOPHEN 1000 MG: 500 TABLET, FILM COATED ORAL at 15:11

## 2018-07-17 RX ADMIN — ONDANSETRON 4 MG: 2 INJECTION INTRAMUSCULAR; INTRAVENOUS at 11:17

## 2018-07-17 RX ADMIN — EPHEDRINE SULFATE 10 MG: 50 INJECTION INTRAMUSCULAR; INTRAVENOUS; SUBCUTANEOUS at 11:14

## 2018-07-17 RX ADMIN — ASPIRIN 325 MG: 325 TABLET, DELAYED RELEASE ORAL at 15:11

## 2018-07-17 RX ADMIN — DEXAMETHASONE SODIUM PHOSPHATE 10 MG: 10 INJECTION INTRAMUSCULAR; INTRAVENOUS at 08:28

## 2018-07-17 RX ADMIN — Medication 2 G: at 10:00

## 2018-07-17 RX ADMIN — PROPOFOL 17 MG: 10 INJECTION, EMULSION INTRAVENOUS at 10:05

## 2018-07-17 RX ADMIN — LISINOPRIL 5 MG: 5 TABLET ORAL at 21:02

## 2018-07-17 RX ADMIN — HYDROMORPHONE HYDROCHLORIDE 1 MG: 1 INJECTION, SOLUTION INTRAMUSCULAR; INTRAVENOUS; SUBCUTANEOUS at 10:35

## 2018-07-17 RX ADMIN — SODIUM CHLORIDE, POTASSIUM CHLORIDE, SODIUM LACTATE AND CALCIUM CHLORIDE: 600; 310; 30; 20 INJECTION, SOLUTION INTRAVENOUS at 13:37

## 2018-07-17 RX ADMIN — CYANOCOBALAMIN TAB 1000 MCG 1000 MCG: 1000 TAB at 21:01

## 2018-07-17 RX ADMIN — KETOROLAC TROMETHAMINE 15 MG: 30 INJECTION, SOLUTION INTRAMUSCULAR at 15:12

## 2018-07-17 RX ADMIN — KETOROLAC TROMETHAMINE 15 MG: 30 INJECTION, SOLUTION INTRAMUSCULAR at 22:34

## 2018-07-17 RX ADMIN — EPHEDRINE SULFATE 10 MG: 50 INJECTION INTRAMUSCULAR; INTRAVENOUS; SUBCUTANEOUS at 11:19

## 2018-07-17 RX ADMIN — ALBUTEROL SULFATE 2.5 MG: 2.5 SOLUTION RESPIRATORY (INHALATION) at 08:51

## 2018-07-17 RX ADMIN — MIDAZOLAM 2 MG: 1 INJECTION INTRAMUSCULAR; INTRAVENOUS at 09:16

## 2018-07-17 RX ADMIN — SODIUM CHLORIDE, POTASSIUM CHLORIDE, SODIUM LACTATE AND CALCIUM CHLORIDE: 600; 310; 30; 20 INJECTION, SOLUTION INTRAVENOUS at 22:33

## 2018-07-17 RX ADMIN — BUPIVACAINE HYDROCHLORIDE 30 ML: 2.5 INJECTION, SOLUTION EPIDURAL; INFILTRATION; INTRACAUDAL; PERINEURAL at 09:33

## 2018-07-17 RX ADMIN — ACETAMINOPHEN 650 MG: 325 TABLET, FILM COATED ORAL at 08:29

## 2018-07-17 RX ADMIN — DOCUSATE SODIUM 100 MG: 100 CAPSULE, LIQUID FILLED ORAL at 21:01

## 2018-07-17 RX ADMIN — FENTANYL CITRATE 100 MCG: 50 INJECTION, SOLUTION INTRAMUSCULAR; INTRAVENOUS at 10:29

## 2018-07-17 RX ADMIN — ASPIRIN 325 MG: 325 TABLET, DELAYED RELEASE ORAL at 21:01

## 2018-07-17 RX ADMIN — ALBUTEROL SULFATE 2.5 MG: 2.5 SOLUTION RESPIRATORY (INHALATION) at 12:21

## 2018-07-17 RX ADMIN — ACETAMINOPHEN 1000 MG: 500 TABLET, FILM COATED ORAL at 22:33

## 2018-07-17 RX ADMIN — SODIUM CHLORIDE, POTASSIUM CHLORIDE, SODIUM LACTATE AND CALCIUM CHLORIDE: 600; 310; 30; 20 INJECTION, SOLUTION INTRAVENOUS at 08:28

## 2018-07-17 RX ADMIN — GABAPENTIN 300 MG: 300 CAPSULE ORAL at 08:34

## 2018-07-17 RX ADMIN — OXYBUTYNIN CHLORIDE 15 MG: 10 TABLET, FILM COATED, EXTENDED RELEASE ORAL at 21:00

## 2018-07-17 RX ADMIN — ALLOPURINOL 300 MG: 300 TABLET ORAL at 21:00

## 2018-07-17 RX ADMIN — DOCUSATE SODIUM 100 MG: 100 CAPSULE, LIQUID FILLED ORAL at 15:10

## 2018-07-17 ASSESSMENT — PULMONARY FUNCTION TESTS
PIF_VALUE: 20
PIF_VALUE: 21
PIF_VALUE: 4
PIF_VALUE: 21
PIF_VALUE: 16
PIF_VALUE: 2
PIF_VALUE: 20
PIF_VALUE: 17
PIF_VALUE: 20
PIF_VALUE: 20
PIF_VALUE: 11
PIF_VALUE: 20
PIF_VALUE: 19
PIF_VALUE: 21
PIF_VALUE: 19
PIF_VALUE: 2
PIF_VALUE: 17
PIF_VALUE: 20
PIF_VALUE: 19
PIF_VALUE: 17
PIF_VALUE: 20
PIF_VALUE: 17
PIF_VALUE: 20
PIF_VALUE: 21
PIF_VALUE: 2
PIF_VALUE: 21
PIF_VALUE: 21
PIF_VALUE: 19
PIF_VALUE: 19
PIF_VALUE: 21
PIF_VALUE: 22
PIF_VALUE: 17
PIF_VALUE: 19
PIF_VALUE: 22
PIF_VALUE: 20
PIF_VALUE: 20
PIF_VALUE: 18
PIF_VALUE: 20
PIF_VALUE: 2
PIF_VALUE: 18
PIF_VALUE: 20
PIF_VALUE: 18
PIF_VALUE: 2
PIF_VALUE: 20
PIF_VALUE: 4
PIF_VALUE: 18
PIF_VALUE: 15
PIF_VALUE: 19
PIF_VALUE: 18
PIF_VALUE: 2
PIF_VALUE: 20
PIF_VALUE: 17
PIF_VALUE: 19
PIF_VALUE: 1
PIF_VALUE: 18
PIF_VALUE: 20
PIF_VALUE: 1
PIF_VALUE: 19
PIF_VALUE: 20
PIF_VALUE: 18
PIF_VALUE: 7
PIF_VALUE: 20
PIF_VALUE: 21
PIF_VALUE: 20
PIF_VALUE: 21
PIF_VALUE: 19
PIF_VALUE: 24
PIF_VALUE: 20
PIF_VALUE: 21
PIF_VALUE: 19
PIF_VALUE: 20
PIF_VALUE: 2
PIF_VALUE: 32
PIF_VALUE: 19
PIF_VALUE: 20
PIF_VALUE: 19
PIF_VALUE: 21
PIF_VALUE: 19
PIF_VALUE: 21
PIF_VALUE: 28
PIF_VALUE: 17
PIF_VALUE: 18
PIF_VALUE: 17
PIF_VALUE: 19
PIF_VALUE: 20
PIF_VALUE: 20
PIF_VALUE: 18
PIF_VALUE: 20
PIF_VALUE: 19
PIF_VALUE: 20
PIF_VALUE: 19
PIF_VALUE: 20
PIF_VALUE: 2
PIF_VALUE: 20
PIF_VALUE: 22

## 2018-07-17 ASSESSMENT — PAIN DESCRIPTION - ORIENTATION: ORIENTATION: RIGHT

## 2018-07-17 ASSESSMENT — PAIN SCALES - GENERAL
PAINLEVEL_OUTOF10: 1
PAINLEVEL_OUTOF10: 0
PAINLEVEL_OUTOF10: 0
PAINLEVEL_OUTOF10: 4
PAINLEVEL_OUTOF10: 3
PAINLEVEL_OUTOF10: 4

## 2018-07-17 ASSESSMENT — PAIN DESCRIPTION - PAIN TYPE: TYPE: SURGICAL PAIN

## 2018-07-17 ASSESSMENT — PAIN DESCRIPTION - DESCRIPTORS
DESCRIPTORS: ACHING
DESCRIPTORS: ACHING

## 2018-07-17 ASSESSMENT — ENCOUNTER SYMPTOMS: SHORTNESS OF BREATH: 1

## 2018-07-17 ASSESSMENT — PAIN DESCRIPTION - LOCATION: LOCATION: KNEE

## 2018-07-17 ASSESSMENT — PAIN - FUNCTIONAL ASSESSMENT: PAIN_FUNCTIONAL_ASSESSMENT: 0-10

## 2018-07-17 NOTE — ANESTHESIA PROCEDURE NOTES
Peripheral Block    Start time: 7/17/2018 9:15 AM  End time: 7/17/2018 9:39 AM  Staffing  Anesthesiologist: Jw Carson  Performed: anesthesiologist   Preanesthetic Checklist  Completed: patient identified, site marked, surgical consent, pre-op evaluation, timeout performed, IV checked, risks and benefits discussed, monitors and equipment checked, anesthesia consent given, oxygen available and patient being monitored  Peripheral Block  Patient position: supine  Prep: ChloraPrep  Patient monitoring: cardiac monitor, continuous pulse ox, continuous capnometry and IV access  Block type: Femoral  Laterality: right  Injection technique: catheter  Procedures: Doppler guided and ultrasound guided  Local infiltration: lidocaine and bupivacaine  Infiltration strength: 1 %  Dose: 3 mL  Adductor canal  Provider prep: mask and sterile gloves  Local infiltration: lidocaine and bupivacaine  Needle  Needle type: short-bevel   Needle gauge: 20 G  Catheter type: open end  Catheter size: 18 G  Test dose: negative  Assessment  Injection assessment: negative aspiration for heme, no paresthesia on injection and local visualized surrounding nerve on ultrasound  Slow fractionated injection: yes  Hemodynamics: stable  Reason for block: post-op pain management

## 2018-07-17 NOTE — ANESTHESIA PRE PROCEDURE
Department of Anesthesiology  Preprocedure Note       Name:  Jan Rice   Age:  69 y.o.  :  1940                                          MRN:  [id number]         Date:  2018      Surgeon: Gopal Mcneill):  Roverto Gonzales MD    Procedure: Procedure(s):  KNEE TOTAL ARTHROPLASTY    Medications prior to admission:   Prior to Admission medications    Medication Sig Start Date End Date Taking? Authorizing Provider   oxybutynin (DITROPAN XL) 15 MG extended release tablet Take 15 mg by mouth daily    Historical Provider, MD   diclofenac sodium 1 % GEL Apply 2 g topically 4 times daily as needed for Pain    Historical Provider, MD   Cetirizine HCl (ZYRTEC ALLERGY PO) Take by mouth daily    Historical Provider, MD   lisinopril (PRINIVIL;ZESTRIL) 5 MG tablet Take 5 mg by mouth daily    Historical Provider, MD   Cinnamon 500 MG TABS Take by mouth daily    Historical Provider, MD   diphenhydrAMINE (BENADRYL) 25 MG capsule Take 25 mg by mouth nightly    Historical Provider, MD   mupirocin (BACTROBAN) 2 % ointment Place small amount in each nares (nostril) as directed, twice a day, for 10 days prior to surgery. 18   Roverto Gonzales MD   ketoconazole (NIZORAL) 2 % shampoo Apply topically daily as needed for Itching Apply topically daily as needed.     Historical Provider, MD   levothyroxine (SYNTHROID) 88 MCG tablet Take 88 mcg by mouth Daily    Historical Provider, MD   allopurinol (ZYLOPRIM) 300 MG tablet Take 300 mg by mouth daily    Historical Provider, MD   celecoxib (CELEBREX) 100 MG capsule Take 100 mg by mouth daily     Historical Provider, MD   Polyethylene Glycol (PEG EX) Apply topically    Historical Provider, MD   Coenzyme Q10 (COQ10) 100 MG CAPS Take by mouth daily     Historical Provider, MD   Resveratrol 100 MG CAPS Take by mouth    Historical Provider, MD   Ascorbic Acid (VITAMIN C) 500 MG/5ML LIQD Take by mouth    Historical Provider, MD   Cholecalciferol (VITAMIN D3) 2000 UNITS CAPS Take by

## 2018-07-17 NOTE — H&P
mouth        NIACINAMIDE PO Take by mouth        traMADol (ULTRAM) 50 MG tablet Take 50 mg by mouth every 6 hours as needed for Pain        Glucosamine-Chondroit-Vit C-Mn (GLUCOSAMINE 1500 COMPLEX PO) Take by mouth        TURMERIC PO Take by mouth        vitamin B-12 (CYANOCOBALAMIN) 1000 MCG tablet Take 1,000 mcg by mouth daily        mupirocin (BACTROBAN) 2 % ointment Place small amount in each nares (nostril) as directed, twice a day, for 10 days prior to surgery. 1 Tube 0    ketoconazole (NIZORAL) 2 % shampoo Apply topically daily as needed for Itching Apply topically daily as needed.          No current facility-administered medications on file prior to encounter.       General health:  Patient feeling well. No fever or chills. Skin:  No itching, redness or rash. HEENT:  No headache, rhinorrhea, nasal congestion or sore throat. Hearing aids. Reading glasses. Neck:  No pain, stiffness or masses. Cardiovascular/Respiratory system:  No chest pain, palpitations, cough or shortness of breath. Gastrointestinal tract: No abdominal pain, nausea, vomiting, diarrhea or constipation. Genitourinary:  No dysuria, hesitancy, urgency, frequency or discoloration of urine. Urge incontinence. Locomotor:  See HPI. Neuropsychiatric:  No referable complaints.                  GENERAL PHYSICAL EXAM:      Vitals: BP (!) 125/56   Temp 97.8 °F (36.6 °C) (Oral)   Resp 16   Ht 5' 3\" (1.6 m)   Wt 202 lb (91.6 kg)   LMP  (Approximate)   SpO2 98%   BMI 35.78 kg/m²  Body mass index is 35.78 kg/m².      GENERAL APPEARANCE: Ronn Walker is a 69 y.o.  female, moderately obese, nourished, conscious, alert. Does not appear to be in any distress or pain at this time. SKIN:  Normal temperature, turgor and texture. No cyanosis or jaundice.               HEAD: Normocephalic, atraumatic. Percell Brakeman EYES:  Pupils equal, reactive to light and accomodation. Conjunctiva clear, no pallor. THROAT:  Mucous membranes moist. No tonsillar erythema or exudates. NECK:  No stiffness, trachea central.  No palpable masses. CHEST:  Symmetrical and equal on expansion. HEART:  Regular rate, rhythm. No murmurs. LUNGS:  Equal on expansion. Clear to auscultation with no adventitious sounds. ABDOMEN:  Moderately obese. Soft on palpation. No localized tenderness, guarding or rigidity. No palpable organomegaly. LYMPHATICS:  No palpable cervical lymphadenopathy.      LOCOMOTOR, BACK AND SPINE:  No tenderness or deformities. No flank tenderness. Walker for stability. EXTREMITIES:  Lower extremities are symmetrical with minimal nonpitting pretibial/pedal edema. No discoloration or ulcerations. No warmth, tenderness, erythema noted in lower legs bilaterally. Significant crepitus noted with flexion/extension of the right knee. Range of motion is normal. No motor or sensory deficit appreciated. Peripheral pulses 2+ bilaterally.     NEUROLOGIC:  The patient is conscious, alert, oriented. No apparent focal sensory or motor deficits. Cranial nerve exam reveals no deficits.                                                                        PROVISIONAL DIAGNOSES / SURGERY:       DJD, Right Knee  Total Knee Arthroplasty, Right          Patient Active Problem List     Diagnosis Date Noted    Urgency of urination 02/17/2017    Anemia 02/17/2017    Apnea 02/17/2017    Arthropathia 02/17/2017    Benign essential HTN 02/17/2017    Chronic renal impairment 02/17/2017    DDD (degenerative disc disease), lumbar 02/17/2017    Disorder of bone and articular cartilage 02/17/2017    Disorder of lipid metabolism 02/17/2017    Edema 02/17/2017    Glaucoma associated with anterior segment anomaly 02/17/2017    Gout 02/17/2017    Gouty arthropathy 02/17/2017    BP (high blood pressure) 02/17/2017    Hyponatremia 02/17/2017    Hypoactive thyroid 02/17/2017    Elevated fasting blood sugar 02/17/2017    Insomnia 02/17/2017    Iron deficiency anemia 02/17/2017    Primary localized osteoarthritis 02/17/2017    Arthritis, degenerative, localized, primary, hand 02/17/2017    Lumbosacral neuritis 02/17/2017    Increased MCV 02/17/2017    Menopausal symptom 02/17/2017    Obstructive apnea 02/17/2017    Urinary incontinence 02/17/2017    Varicose veins of lower extremity 02/17/2017    Enthesopathy of hip 12/11/2009    Shortness of breath 04/21/2009    Essential (primary) hypertension 02/17/2009    Inflammation of sacroiliac joint ([facility] [address] 01/23/2008    Osteoporosis 09/14/2007    Arthritis of knee, degenerative 07/26/2006    Osler-Vaquez disease ([facility] [address] 05/02/2006            Deidra Molina PA-C on 7/3/2018 at 4:16 PM         Cosigned by: Audrey Medellin MD at 7/4/2018 11:33 AM

## 2018-07-17 NOTE — CARE COORDINATION
Social work: advised by [employer] pt would like [facility] at discharge. Faxed referral.  Will need nikole, Rx at discharge. This snf does not require a hens.  Robby pagan

## 2018-07-18 ENCOUNTER — CARE COORDINATION (OUTPATIENT)
Dept: CASE MANAGEMENT | Age: 78
End: 2018-07-18

## 2018-07-18 VITALS
WEIGHT: 224.65 LBS | SYSTOLIC BLOOD PRESSURE: 107 MMHG | OXYGEN SATURATION: 94 % | BODY MASS INDEX: 39.8 KG/M2 | TEMPERATURE: 98.1 F | HEIGHT: 63 IN | DIASTOLIC BLOOD PRESSURE: 76 MMHG | RESPIRATION RATE: 16 BRPM | HEART RATE: 90 BPM

## 2018-07-18 PROCEDURE — 99024 POSTOP FOLLOW-UP VISIT: CPT | Performed by: ORTHOPAEDIC SURGERY

## 2018-07-18 PROCEDURE — 97110 THERAPEUTIC EXERCISES: CPT

## 2018-07-18 PROCEDURE — 6370000000 HC RX 637 (ALT 250 FOR IP): Performed by: ORTHOPAEDIC SURGERY

## 2018-07-18 PROCEDURE — 99221 1ST HOSP IP/OBS SF/LOW 40: CPT | Performed by: INTERNAL MEDICINE

## 2018-07-18 PROCEDURE — 97530 THERAPEUTIC ACTIVITIES: CPT

## 2018-07-18 PROCEDURE — 2580000003 HC RX 258: Performed by: ORTHOPAEDIC SURGERY

## 2018-07-18 PROCEDURE — 6360000002 HC RX W HCPCS: Performed by: ORTHOPAEDIC SURGERY

## 2018-07-18 PROCEDURE — 6370000000 HC RX 637 (ALT 250 FOR IP): Performed by: INTERNAL MEDICINE

## 2018-07-18 PROCEDURE — 97116 GAIT TRAINING THERAPY: CPT

## 2018-07-18 PROCEDURE — 97535 SELF CARE MNGMENT TRAINING: CPT

## 2018-07-18 RX ORDER — KETOCONAZOLE 20 MG/ML
SHAMPOO TOPICAL DAILY PRN
Status: DISCONTINUED | OUTPATIENT
Start: 2018-07-18 | End: 2018-07-18 | Stop reason: HOSPADM

## 2018-07-18 RX ORDER — LISINOPRIL 5 MG/1
5 TABLET ORAL DAILY
Status: DISCONTINUED | OUTPATIENT
Start: 2018-07-18 | End: 2018-07-18 | Stop reason: HOSPADM

## 2018-07-18 RX ORDER — ASPIRIN 81 MG/1
81 TABLET ORAL EVERY OTHER DAY
Status: DISCONTINUED | OUTPATIENT
Start: 2018-07-18 | End: 2018-07-18 | Stop reason: HOSPADM

## 2018-07-18 RX ORDER — LANOLIN ALCOHOL/MO/W.PET/CERES
1000 CREAM (GRAM) TOPICAL DAILY
Status: DISCONTINUED | OUTPATIENT
Start: 2018-07-18 | End: 2018-07-18 | Stop reason: HOSPADM

## 2018-07-18 RX ORDER — POLYETHYLENE GLYCOL 3350 17 G/17G
17 POWDER, FOR SOLUTION ORAL 2 TIMES DAILY
Status: DISCONTINUED | OUTPATIENT
Start: 2018-07-18 | End: 2018-07-18 | Stop reason: HOSPADM

## 2018-07-18 RX ADMIN — POLYETHYLENE GLYCOL (3350) 17 G: 17 POWDER, FOR SOLUTION ORAL at 12:13

## 2018-07-18 RX ADMIN — Medication 10 ML: at 08:29

## 2018-07-18 RX ADMIN — OXYCODONE HYDROCHLORIDE 10 MG: 5 TABLET ORAL at 08:28

## 2018-07-18 RX ADMIN — KETOROLAC TROMETHAMINE 15 MG: 30 INJECTION, SOLUTION INTRAMUSCULAR at 06:07

## 2018-07-18 RX ADMIN — LISINOPRIL 5 MG: 5 TABLET ORAL at 12:13

## 2018-07-18 RX ADMIN — ACETAMINOPHEN 1000 MG: 500 TABLET, FILM COATED ORAL at 14:37

## 2018-07-18 RX ADMIN — OXYCODONE HYDROCHLORIDE 10 MG: 5 TABLET ORAL at 16:40

## 2018-07-18 RX ADMIN — KETOROLAC TROMETHAMINE 15 MG: 30 INJECTION, SOLUTION INTRAMUSCULAR at 14:37

## 2018-07-18 RX ADMIN — ASPIRIN 81 MG: 81 TABLET, COATED ORAL at 12:13

## 2018-07-18 RX ADMIN — Medication 2 G: at 01:51

## 2018-07-18 RX ADMIN — OXYBUTYNIN CHLORIDE 15 MG: 10 TABLET, FILM COATED, EXTENDED RELEASE ORAL at 12:12

## 2018-07-18 RX ADMIN — DOCUSATE SODIUM 100 MG: 100 CAPSULE, LIQUID FILLED ORAL at 08:28

## 2018-07-18 RX ADMIN — LEVOTHYROXINE SODIUM 88 MCG: 88 TABLET ORAL at 06:07

## 2018-07-18 RX ADMIN — CYANOCOBALAMIN TAB 1000 MCG 1000 MCG: 1000 TAB at 12:13

## 2018-07-18 RX ADMIN — ASPIRIN 325 MG: 325 TABLET, DELAYED RELEASE ORAL at 08:28

## 2018-07-18 RX ADMIN — ACETAMINOPHEN 1000 MG: 500 TABLET, FILM COATED ORAL at 06:07

## 2018-07-18 ASSESSMENT — PAIN DESCRIPTION - LOCATION
LOCATION: KNEE

## 2018-07-18 ASSESSMENT — PAIN DESCRIPTION - PAIN TYPE
TYPE: ACUTE PAIN;SURGICAL PAIN
TYPE: ACUTE PAIN;SURGICAL PAIN
TYPE: SURGICAL PAIN

## 2018-07-18 ASSESSMENT — PAIN SCALES - GENERAL
PAINLEVEL_OUTOF10: 7
PAINLEVEL_OUTOF10: 7
PAINLEVEL_OUTOF10: 3
PAINLEVEL_OUTOF10: 7
PAINLEVEL_OUTOF10: 0
PAINLEVEL_OUTOF10: 0
PAINLEVEL_OUTOF10: 1

## 2018-07-18 ASSESSMENT — PAIN DESCRIPTION - ORIENTATION
ORIENTATION: RIGHT

## 2018-07-18 ASSESSMENT — PAIN DESCRIPTION - DESCRIPTORS
DESCRIPTORS: DISCOMFORT
DESCRIPTORS: DISCOMFORT

## 2018-07-18 NOTE — PROGRESS NOTES
72 degrees, Supine right knee extension lacking 5 degrees        Activity Tolerance: Patient Tolerated treatment well  Activity Tolerance: Seated Right Knee Flexion AROM 72 degrees, Supine right knee extension lacking 5 degrees  PT Equipment Recommendations  Equipment Needed: No       Assessment  Activity Tolerance: Patient Tolerated treatment well   Body structures, Functions, Activity limitations: Decreased functional mobility ; Decreased ROM; Decreased strength;Decreased high-level IADLs  Assessment: Impaired mobility due to R MAME  Discharge Recommendations: Home with assist PRN;Home with Home health PT (pt requesting SNF at [facility])     Type of devices: Call light within reach; Left in chair;Nurse notified;Gait belt     Plan  Times per week: BID  Times per day: Twice a day  Current Treatment Recommendations: Strengthening, ROM, Balance Training, Stair training, Gait Training, Transfer Training, Safety Education & Training, Functional Mobility Training    Patient Education  New Education Provided: Importance of flexing right knee  Learner:patient  Method: demonstration and explanation       Outcome: needs reinforcement     Goals  Short term goals  Time Frame for Short term goals: 2-4 sessions  Short term goal 1: mod-I bed mobility  Short term goal 2: mod-I transfers  Short term goal 3: mod-I gait with RW x 150ft  Short term goal 4: Negotiate 2-3 steps with rail/SBA       07/18/18 0950 07/18/18 1240   PT Individual Minutes   Time In [id number]   Time Out [id number]   Minutes 55 52     Electronically signed by Teresa Johnston PTA on 7/18/18 at 3:37 PM

## 2018-07-18 NOTE — PROGRESS NOTES
[address] [facility]    INPATIENT OCCUPATIONAL THERAPY  PROGRESS NOTE  Date: 2018  Patient Name: Clyde Joy      Room: -  MRN: [id number]    : 1940  (80 y.o.) Gender: female     Discharge Recommendations:  Home with Home health OT, Continue to assess pending progress       Referring Practitioner: Dr. Luciana Woods  Diagnosis: R TKA 18  General  Chart Reviewed: Yes  Patient assessed for rehabilitation services?: Yes  Response to previous treatment: Patient with no complaints from previous session  Family / Caregiver Present: No  Referring Practitioner: Dr. Luciana Woods  Diagnosis: R TKA 18    Restrictions  Restrictions/Precautions: Fall Risk, Surgical Protocols  Implants present? : Metal implants (L TKA , R TKA 18)  Required Braces or Orthoses?: No      Subjective  Subjective: \"I feel better about going there because I won't be alone\" pt states in regards to SNF for rehab at D/C versus home c minimal A, pt verbalized not feeling confident to go home just yet  Comments: pt friendly, cooperative and motivated  Patient Currently in Pain: Yes  Pain Level: 3  Pain Location: Knee  Pain Orientation: Right  Overall Orientation Status: Within Functional Limits       Objective  Cognition  Overall Cognitive Status: WFL  Safety Judgement: Decreased awareness of need for safety (R/W safety/tech)  Bed mobility  Rolling to Right: Stand by assistance  Supine to Sit: Stand by assistance  Scooting: Stand by assistance  Comment: HOB elevated slightly, bed rails used  Balance  Sitting Balance: Independent (sitting EOB)  Standing Balance: Stand by assistance (0-1 UE support)  Standing Balance  Time: AM: 2-3 min x3, 1-2 min x2; PM: ~15 min  Activity: AM: functional mobility in room/bathroom, shower transfer, yovanny-care and clothing mgmt, grooming sinkside; PM: item retrieval/transport/requesting to obtain items for D/C in room/closet, intermittent cuing for safety/tech using R/W c F return, Arturo Fitzpatrick on 7/18/18 at 3:58 PM

## 2018-07-18 NOTE — CONSULTS
at [facility]          Medications Prior to Admission:     Prior to Admission medications    Medication Sig Start Date End Date Taking? Authorizing Provider   aspirin 325 MG EC tablet Take 1 tablet by mouth 2 times daily 7/18/18  Yes Omayra Blevins MD   HYDROcodone-acetaminophen (NORCO) 5-325 MG per tablet Take 2 tablets by mouth as needed for Pain for up to 7 days. . 7/17/18 7/24/18 Yes Omayra Blevins MD   oxybutynin (DITROPAN XL) 15 MG extended release tablet Take 15 mg by mouth daily   Yes Historical Provider, MD   diclofenac sodium 1 % GEL Apply 2 g topically 4 times daily as needed for Pain   Yes Historical Provider, MD   Cetirizine HCl (ZYRTEC ALLERGY PO) Take 10 mg by mouth daily    Yes Historical Provider, MD   lisinopril (PRINIVIL;ZESTRIL) 5 MG tablet Take 5 mg by mouth daily   Yes Historical Provider, MD   Cinnamon 500 MG TABS Take by mouth daily   Yes Historical Provider, MD   diphenhydrAMINE (BENADRYL) 25 MG capsule Take 25 mg by mouth nightly   Yes Historical Provider, MD   levothyroxine (SYNTHROID) 88 MCG tablet Take 88 mcg by mouth Daily   Yes Historical Provider, MD   allopurinol (ZYLOPRIM) 300 MG tablet Take 300 mg by mouth daily   Yes Historical Provider, MD   celecoxib (CELEBREX) 100 MG capsule Take 100 mg by mouth daily    Yes Historical Provider, MD   Polyethylene Glycol (PEG EX) Apply topically   Yes Historical Provider, MD   Coenzyme Q10 (COQ10) 100 MG CAPS Take by mouth daily    Yes Historical Provider, MD   Resveratrol 100 MG CAPS Take by mouth   Yes Historical Provider, MD   Ascorbic Acid (VITAMIN C) 500 MG/5ML LIQD Take by mouth   Yes Historical Provider, MD   Cholecalciferol (VITAMIN D3) 2000 UNITS CAPS Take by mouth   Yes Historical Provider, MD   Omega-3 Fatty Acids (OMEGA-3 FISH OIL) 1200 MG CAPS Take by mouth   Yes Historical Provider, MD   vitamin E 400 UNIT capsule Take 400 Units by mouth daily   Yes Historical Provider, MD   B Complex-C (SUPER B COMPLEX/VITAMIN C PO) Take by mouth   Yes Historical Provider, MD   Calcium Carb-Cholecalciferol (CALCIUM 600 + D PO) Take by mouth   Yes Historical Provider, MD   Multiple Vitamin (MULTI VITAMIN DAILY PO) Take by mouth   Yes Historical Provider, MD   NIACINAMIDE PO Take by mouth   Yes Historical Provider, MD   Glucosamine-Chondroit-Vit C-Mn (GLUCOSAMINE 1500 COMPLEX PO) Take by mouth   Yes Historical Provider, MD   TURMERIC PO Take by mouth   Yes Historical Provider, MD   vitamin B-12 (CYANOCOBALAMIN) 1000 MCG tablet Take 1,000 mcg by mouth daily   Yes Historical Provider, MD   ketoconazole (NIZORAL) 2 % shampoo Apply topically daily as needed for Itching Apply topically daily as needed. Historical Provider, MD        Allergies:     Povidone-iodine; Hydrochlorothiazide; Meperidine; and Naproxen    Social History:     Tobacco:    reports that she has quit smoking. She has never used smokeless tobacco.  Alcohol:      reports that she drinks alcohol. Drug Use:  reports that she does not use drugs. Family History:     Family History   Problem Relation Age of Onset    Heart Failure Mother     Heart Disease Father        Review of Systems:     Positive and Negative as described in HPI. CONSTITUTIONAL:  negative for fevers, chills, sweats, fatigue, weight loss  HEENT:  negative for vision, hearing changes, runny nose, throat pain  RESPIRATORY:  negative for shortness of breath, cough, congestion, wheezing. CARDIOVASCULAR:  negative for chest pain, palpitations.   GASTROINTESTINAL:  negative for nausea, vomiting, diarrhea, constipation, change in bowel habits, abdominal pain   GENITOURINARY:  negative for difficulty of urination, burning with urination, frequency   INTEGUMENT:  negative for rash, skin lesions, easy bruising   HEMATOLOGIC/LYMPHATIC:  negative for swelling/edema   ALLERGIC/IMMUNOLOGIC:  negative for urticaria , itching  ENDOCRINE:  negative increase in drinking, increase in urination, hot or cold intolerance  MUSCULOSKELETAL: right renoe epain is better post op    NEUROLOGICAL:  negative for headaches, dizziness, lightheadedness, numbness, pain, tingling extremities  BEHAVIOR/PSYCH:  negative for depression, anxiety    Physical Exam:     /76   Pulse 90   Temp 98.1 °F (36.7 °C) (Oral)   Resp 16   Ht 5' 3\" (1.6 m)   Wt 224 lb 10.4 oz (101.9 kg)   LMP  (Approximate)   SpO2 94%   BMI 39.79 kg/m²   Temp (24hrs), Av.9 °F (36.6 °C), Min:97.2 °F (36.2 °C), Max:98.2 °F (36.8 °C)    No results for input(s): POCGLU in the last 72 hours. Intake/Output Summary (Last 24 hours) at 18 6411  Last data filed at 18 2655   Gross per 24 hour   Intake             2353 ml   Output                0 ml   Net             2353 ml       General Appearance:  alert, well appearing, and in no acute distress  Mental status: oriented to person, place, and time with normal affect  Head:  normocephalic, atraumatic. Eye: no icterus, redness, pupils equal and reactive, extraocular eye movements intact, conjunctiva clear  Ear: normal external ear, no discharge, hearing intact  Nose:  no drainage noted  Mouth: mucous membranes moist  Neck: supple, no carotid bruits, thyroid not palpable  Lungs: Bilateral equal air entry, clear to ausculation, no wheezing, rales or rhonchi, normal effort  Cardiovascular: normal rate, regular rhythm, no murmur, gallop, rub. Abdomen: Soft, nontender, nondistended, normal bowel sounds, no hepatomegaly or splenomegaly  Neurologic: There are no new focal motor or sensory deficits, normal muscle tone and bulk, no abnormal sensation, normal speech, cranial nerves II through XII grossly intact  Skin: No gross lesions, rashes, bruising or bleeding on exposed skin area  Extremities sp knee replacment  Right    Psych: normal affect    Investigations:      Laboratory Testing:  No results found for this or any previous visit (from the past 24 hour(s)).     Imaging/Diagonstics:      Assessment : Primary Problem  <principal problem not specified>    Active Hospital Problems    Diagnosis Date Noted    Arthritis of knee, degenerative [M17.10] 07/26/2006       Plan:   h ox hen gout  djd  S/p arthittis  dvt pro   Pain controll meds reconsield  Hypothyroid  Synthroid  Ok to dc from medical stand point    1. Consultations:   IP CONSULT TO PRIMARY CARE PROVIDER  IP CONSULT TO SOCIAL WORK      Whitney Chowdhury MD  7/18/2018  5:19 PM    Copy sent to Dr. Luan Fisher    Please note that this chart was generated using voice recognition Dragon dictation software. Although every effort was made to ensure the accuracy of this automated transcription, some errors in transcription may have occurred.

## 2018-07-18 NOTE — DISCHARGE SUMMARY
Patient discharged to daughter. Went over discharge instructions with patient all questions answered. All belongings returned. Medications taken out of nurse .

## 2018-07-18 NOTE — PROGRESS NOTES
Post Operative Progress Note    7/18/2018 8:08 AM  Info:   Admit Date: 7/17/2018  PCP: Marnie Reyes      Medications:   Scheduled Meds:   scopolamine  1 patch Transdermal Once    sodium chloride flush  10 mL Intravenous 2 times per day    acetaminophen  1,000 mg Oral 3 times per day    ketorolac  15 mg Intravenous 3 times per day    docusate sodium  100 mg Oral BID    aspirin  325 mg Oral BID    levothyroxine  88 mcg Oral Daily    allopurinol  300 mg Oral Nightly    oxybutynin  15 mg Oral Nightly    vitamin B-12  1,000 mcg Oral Nightly    lisinopril  5 mg Oral Nightly     Continuous Infusions:   lactated ringers 125 mL/hr at 07/17/18 2233    lactated ringers 125 mL/hr at 07/17/18 0828    bupivacaine 0.125% 750 mL (07/17/18 1209)     PRN Meds:sodium chloride flush, oxyCODONE **OR** oxyCODONE, HYDROmorphone **OR** HYDROmorphone, ondansetron, albuterol    Diet:   Diet: DIET GENERAL;    Subjective:   Systemic or Specific Complaints:No Complaints    Objective:     Patient Vitals for the past 24 hrs:   BP Temp Temp src Pulse Resp SpO2 Height Weight   07/18/18 0611 - - - - - - - 224 lb 10.4 oz (101.9 kg)   07/18/18 0541 105/60 98.1 °F (36.7 °C) Oral 71 20 93 % - -   07/17/18 2342 (!) 113/48 97.2 °F (36.2 °C) Oral 68 16 93 % - -   07/17/18 2049 132/85 98.2 °F (36.8 °C) Oral 60 18 91 % - -   07/17/18 1831 92/67 98.1 °F (36.7 °C) Oral 60 16 91 % - -   07/17/18 1808 (!) 106/50 97.5 °F (36.4 °C) Oral 65 16 95 % - -   07/17/18 1315 118/62 97.6 °F (36.4 °C) Oral 90 16 100 % 5' 3\" (1.6 m) 216 lb 4.3 oz (98.1 kg)   07/17/18 1300 (!) 130/56 - - 100 16 94 % - -   07/17/18 1250 139/60 - - 91 14 95 % - -   07/17/18 1240 - 97.5 °F (36.4 °C) - - - - - -   07/17/18 1230 138/63 - - 106 16 99 % - -   07/17/18 1222 - - - - 16 95 % - -   07/17/18 1220 (!) 145/62 - - 101 16 95 % - -   07/17/18 1210 138/74 - - 111 17 96 % - -   07/17/18 1200 (!) 159/66 - - 130 17 100 % - -   07/17/18 1157 (!) 171/88 97.5 °F (36.4 °C) Infrared 137 12 98 % - -         Wound: incision clean and dry without sign of infection   Motion: expected discomfort with range of motion in affected extremity   DVT Exam:  no evidence of DVT on physical examination         Data Review  CBC: No results for input(s): WBC, HGB, PLT in the last 72 hours. Assessment:   Patient is S/P right Knee, Arthoplasty  Pain is well controlled. She has no nausea and no vomiting.     Current activity is up with assistance        Plan:      1:  Continue Physical Therapy  2:  Continue Deep venous thrombosis prophylaxis  3:  Continue Pain Control  4:  Discharge plans home vs ECF       Electronically signed by Lynda Kumar MD on 7/18/2018 at 8:08 AM

## 2018-07-19 ENCOUNTER — CARE COORDINATION (OUTPATIENT)
Dept: CASE MANAGEMENT | Age: 78
End: 2018-07-19

## 2018-07-24 NOTE — CARE COORDINATION
[facility] Update Call    2018    Patient: Clyde Joy Patient : 1940   MRN: [id number]  Reason for Admission: There are no discharge diagnoses documented for the most recent discharge. Discharge Date: 18 RARS: Readmission Risk Score: [address] Update    Care Transitions Interventions  Post Acute Facility:  LONG TER[facility] ACUTE C[facility] [place] [address] Update     Secure e-mail sent to staff @ SNF.  Reminded that patient elected the [facility] program.  Request verification of Skilled Services, number of skilled days used, discharge planning, clinical and functional progress reports

## 2018-07-26 ENCOUNTER — CARE COORDINATION (OUTPATIENT)
Dept: CASE MANAGEMENT | Age: 78
End: 2018-07-26

## 2018-07-30 ENCOUNTER — OFFICE VISIT (OUTPATIENT)
Dept: ORTHOPEDIC SURGERY | Age: 78
End: 2018-07-30

## 2018-07-30 ENCOUNTER — HOSPITAL ENCOUNTER (OUTPATIENT)
Dept: VASCULAR LAB | Age: 78
Discharge: HOME | End: 2018-07-30
Payer: MEDICARE

## 2018-07-30 DIAGNOSIS — I82.4Y1 DEEP VEIN THROMBOSIS (DVT) OF PROXIMAL VEIN OF RIGHT LOWER EXTREMITY, UNSPECIFIED CHRONICITY (HCC): Primary | ICD-10-CM

## 2018-07-30 DIAGNOSIS — L03.115 CELLULITIS OF RIGHT LOWER LEG: ICD-10-CM

## 2018-07-30 DIAGNOSIS — Z96.651 STATUS POST TOTAL RIGHT KNEE REPLACEMENT: ICD-10-CM

## 2018-07-30 DIAGNOSIS — Z51.89 VISIT FOR WOUND CHECK: ICD-10-CM

## 2018-07-30 PROCEDURE — 99024 POSTOP FOLLOW-UP VISIT: CPT | Performed by: ORTHOPAEDIC SURGERY

## 2018-07-30 PROCEDURE — 93971 EXTREMITY STUDY: CPT

## 2018-07-30 RX ORDER — CEFDINIR 300 MG/1
300 CAPSULE ORAL 2 TIMES DAILY
Qty: 20 CAPSULE | Refills: 0 | Status: SHIPPED | OUTPATIENT
Start: 2018-07-30 | End: 2018-08-22 | Stop reason: ALTCHOICE

## 2018-07-30 NOTE — PROGRESS NOTES
Yasmeen Johnson M.D.            [address] [facility] [address] [facility], [address] [facility]             Dept Phone: [phone]             Dept Fax:  [phone]  Luz Garcia returns today. She is 2 weeks status post a right total knee. She was doing great until the last day or 2 over the weekend she carry apposition therapy. She denies any fever or chills. Leg warm    Examination of her right knee notes that she has a little bit of bloody drainage from the midportion. The wound itself looks fine. She has a little redness below this area. She has some swelling in her calf she does have some mild Tenderness as well    I elected to ream have the staples remained in the in lieu of the were not be completely healed as well as a drainage. In the meantime the patient be sent over the hospital for stat Doppler to rule out DVT. We'll empirically started her on some Omnicef she looks excellent is a little synovitis below. I would have her hold her aspirin for the time being as well as portal therapy and come back in 48 hours for recheck of her wound. Review of Systems   Constitutional: Negative. HENT: Negative. Respiratory: Negative. Cardiovascular: Negative. Musculoskeletal: Negative. Neurological: Negative.          Past Medical History:   Diagnosis Date    Arthritis     Asthma     Back pain     Caffeine use     2-3 cups coffee and 2-3 cups tea and 1-2 cans pop daily    Carpal tunnel syndrome     Constipation     Gout     History of blood transfusion     Hypertension     Obesity     Osteoarthritis     PONV (postoperative nausea and vomiting)     Sleep apnea     Thyroid disease     HYPOTHYROID    Urinary incontinence     Urine incontinence     Wears hearing aid     BILATERAL     Past Surgical History:   Procedure Laterality Date    CARPAL TUNNEL RELEASE Bilateral     CATARACT REMOVAL Bilateral     CATARACT REMOVAL WITH IMPLANT Bilateral     COLONOSCOPY      CYSTOSCOPY      and dilation    FOOT SURGERY Right     HYSTERECTOMY      JOINT REPLACEMENT Left 2011    TKA    KNEE ARTHROSCOPY Left     KNEE SURGERY Left     OK TOTAL KNEE ARTHROPLASTY Right 7/17/2018    KNEE TOTAL ARTHROPLASTY performed by Isaias Arreguin MD at [facility]       Family History   Problem Relation Age of Onset    Heart Failure Mother     Heart Disease Father            Orders Placed This Encounter   Procedures    VL Extremity Venous Right     Order Specific Question:   Upper or Lower Extremity? Answer:   Lower Extremity       1. Deep vein thrombosis (DVT) of proximal vein of right lower extremity, unspecified chronicity (HCC)    2. Status post total right knee replacement    3. Cellulitis of right lower leg    4. Visit for wound check        Isaias Arreguin MD    Please note that this chart was generated using voice recognition Dragon dictation software. Although every effort was made to ensure the accuracy of this automated transcription, some errors in transcription may have occurred.

## 2018-07-31 ENCOUNTER — CARE COORDINATION (OUTPATIENT)
Dept: CASE MANAGEMENT | Age: 78
End: 2018-07-31

## 2018-07-31 NOTE — CARE COORDINATION
7/31/18- [facility] Bundle- first attempt- unable to reach patient, left vm message with name and call back number, requested call back//JU

## 2018-08-01 ENCOUNTER — OFFICE VISIT (OUTPATIENT)
Dept: ORTHOPEDIC SURGERY | Age: 78
End: 2018-08-01

## 2018-08-01 DIAGNOSIS — Z96.651 TOTAL KNEE REPLACEMENT STATUS, RIGHT: Primary | ICD-10-CM

## 2018-08-01 PROCEDURE — 99024 POSTOP FOLLOW-UP VISIT: CPT | Performed by: ORTHOPAEDIC SURGERY

## 2018-08-01 RX ORDER — HYDROCODONE BITARTRATE AND ACETAMINOPHEN 5; 325 MG/1; MG/1
1 TABLET ORAL EVERY 6 HOURS PRN
Qty: 40 TABLET | Refills: 0 | Status: SHIPPED | OUTPATIENT
Start: 2018-08-01 | End: 2018-08-31

## 2018-08-01 NOTE — PROGRESS NOTES
Dalton Lopez M.D.            [address] [place] [address]., [address] [facility],[address], [address] [facility]             Dept Phone: [phone]             Dept Fax:  [phone] returns today. She status post right total knee on 7/17/18. She was seen by me a couple days ago she has some increasing drainage to the right knee. She was denied any fever or chills. She was told to hold her aspirin as well as her therapy that time. It did appear that she was getting some cellulitis in the lower leg below that area and also started on some Omnicef. She also had a Doppler that time and this was negative for clot. Patient returns today just for routine appointment to assess her wound as well as possible staple removal    Patient remains afebrile and his says her pain is better    Physical examination still shows a slight amount of scant bloody drainage from the midportion of her wound the redness around the wound itself is significantly improved she still has some mild redness below the knee is no calf tenderness although very tight. We elected not to remove the staples today. Patient is to remain off aspirin continue on Omnicef and hold off on therapy. She'll be seen and 22 days for repeat wound check. She is a cause any problems prior that time. A new Norco prescription was given. Review of Systems   Constitutional: Negative. HENT: Negative. Respiratory: Negative. Cardiovascular: Negative. Musculoskeletal: Negative. Neurological: Negative.          Past Medical History:   Diagnosis Date    Arthritis     Asthma     Back pain     Caffeine use     2-3 cups coffee and 2-3 cups tea and 1-2 cans pop daily    Carpal tunnel syndrome     Constipation     Gout     History of blood transfusion     Hypertension     Obesity     Osteoarthritis     PONV (postoperative nausea and vomiting)     Sleep apnea     Thyroid disease HYPOTHYROID    Urinary incontinence     Urine incontinence     Wears hearing aid     BILATERAL     Past Surgical History:   Procedure Laterality Date    CARPAL TUNNEL RELEASE Bilateral     CATARACT REMOVAL Bilateral     CATARACT REMOVAL WITH IMPLANT Bilateral     COLONOSCOPY      CYSTOSCOPY      and dilation    FOOT SURGERY Right     HYSTERECTOMY      JOINT REPLACEMENT Left 2011    TKA    KNEE ARTHROSCOPY Left     KNEE SURGERY Left     SC TOTAL KNEE ARTHROPLASTY Right 7/17/2018    KNEE TOTAL ARTHROPLASTY performed by Reynold Ortiz MD at [address]       Family History   Problem Relation Age of Onset    Heart Failure Mother     Heart Disease Father          No orders of the defined types were placed in this encounter. 1. Total knee replacement status, right        Reynold Ortiz MD    Please note that this chart was generated using voice recognition Dragon dictation software. Although every effort was made to ensure the accuracy of this automated transcription, some errors in transcription may have occurred.

## 2018-08-02 ENCOUNTER — CARE COORDINATION (OUTPATIENT)
Dept: CASE MANAGEMENT | Age: 78
End: 2018-08-02

## 2018-08-03 ENCOUNTER — TELEPHONE (OUTPATIENT)
Dept: ORTHOPEDIC SURGERY | Age: 78
End: 2018-08-03

## 2018-08-03 ENCOUNTER — HOSPITAL ENCOUNTER (OUTPATIENT)
Age: 78
Discharge: HOME | End: 2018-08-03
Payer: MEDICARE

## 2018-08-03 LAB
ABSOLUTE EOS #: 0.1 K/UL (ref 0–0.4)
ABSOLUTE IMMATURE GRANULOCYTE: ABNORMAL K/UL (ref 0–0.3)
ABSOLUTE LYMPH #: 1.2 K/UL (ref 1–4.8)
ABSOLUTE MONO #: 0.5 K/UL (ref 0.1–1.3)
BASOPHILS # BLD: 1 % (ref 0–2)
BASOPHILS ABSOLUTE: 0.1 K/UL (ref 0–0.2)
DIFFERENTIAL TYPE: ABNORMAL
EOSINOPHILS RELATIVE PERCENT: 2 % (ref 0–4)
HCT VFR BLD CALC: 27 % (ref 36–46)
HEMOGLOBIN: 9.1 G/DL (ref 12–16)
IMMATURE GRANULOCYTES: ABNORMAL %
LYMPHOCYTES # BLD: 15 % (ref 24–44)
MCH RBC QN AUTO: 34.8 PG (ref 26–34)
MCHC RBC AUTO-ENTMCNC: 33.6 G/DL (ref 31–37)
MCV RBC AUTO: 103.7 FL (ref 80–100)
MONOCYTES # BLD: 6 % (ref 1–7)
NRBC AUTOMATED: ABNORMAL PER 100 WBC
PDW BLD-RTO: 14.5 % (ref 11.5–14.9)
PLATELET # BLD: 356 K/UL (ref 150–450)
PLATELET ESTIMATE: ABNORMAL
PMV BLD AUTO: 7.7 FL (ref 6–12)
RBC # BLD: 2.6 M/UL (ref 4–5.2)
RBC # BLD: ABNORMAL 10*6/UL
SEDIMENTATION RATE, ERYTHROCYTE: 68 MM (ref 0–20)
SEG NEUTROPHILS: 76 % (ref 36–66)
SEGMENTED NEUTROPHILS ABSOLUTE COUNT: 6.2 K/UL (ref 1.3–9.1)
WBC # BLD: 8.1 K/UL (ref 3.5–11)
WBC # BLD: ABNORMAL 10*3/UL

## 2018-08-03 PROCEDURE — 36415 COLL VENOUS BLD VENIPUNCTURE: CPT

## 2018-08-03 PROCEDURE — 85651 RBC SED RATE NONAUTOMATED: CPT

## 2018-08-03 PROCEDURE — 85025 COMPLETE CBC W/AUTO DIFF WBC: CPT

## 2018-08-03 NOTE — TELEPHONE ENCOUNTER
Came in for follow up left knee drainage and stiffness, redness. Improved from 8/1/18 but still significant bloody oozing from mostly midincision. Several abraded areas from tape and bandages. Less redness, heat, and shine last 2 days. States no fever or chills. Still on Omnicef. P.T. still held. Staples intact. New dressings applied with Telfa, 4x4's, ABD, Webril, and Ace wraps. Visiting nurses will continue. Pt. will return on 8-6-18 for staple removal once assessed. and postop xrays which have not been done yet. PCP also gave Rxs for follow up labwork. Therapy still on hold.

## 2018-08-08 ENCOUNTER — OFFICE VISIT (OUTPATIENT)
Dept: ORTHOPEDIC SURGERY | Age: 78
End: 2018-08-08

## 2018-08-08 DIAGNOSIS — Z96.651 STATUS POST TOTAL RIGHT KNEE REPLACEMENT: Primary | ICD-10-CM

## 2018-08-08 PROCEDURE — 99024 POSTOP FOLLOW-UP VISIT: CPT | Performed by: ORTHOPAEDIC SURGERY

## 2018-08-08 NOTE — PROGRESS NOTES
Sulema Hernández M.D.            [address] [place] [address]., [address] [facility]               [address]             Dept Phone: [phone]             Dept Fax:  [phone] returns today status post right total knee ×3 weeks. It she was having some wound issues that we've as well as cellulitis issues. She had a negative Doppler out we withheld her physical therapy as well as her aspirin. She's been monitored on a every other day basis by my staff while is gone. She had partial staple removal she's here for final staple removal graft examination right knee notes her wound is improved significant way. Her drainage is down basically to 0. The surrounding erythema is gone the cellulitis is gone    Staples were removed    X-rays are taken reviewed by me shows that hardware in excellent position AP lateral views. There is one retained staple this was removed    Plan  Patient is doing much better overall. She did have elevated sed rate CRP but I was that she is only at 2 weeks out from surgery I think it would just keep a close eye on this she is to contact us if he has any recurrent problems. She did have physical therapy on hold I think she'll hold off until next Monday will be 5 days from now. She is off the aspirin for good. She is taking taken occasional Norco.  I'll like to recheck her back or 2 weeks          Review of Systems   Constitutional: Negative. HENT: Negative. Respiratory: Negative. Cardiovascular: Negative. Musculoskeletal: Negative. Neurological: Negative.          Past Medical History:   Diagnosis Date    Arthritis     Asthma     Back pain     Caffeine use     2-3 cups coffee and 2-3 cups tea and 1-2 cans pop daily    Carpal tunnel syndrome     Constipation     Gout     History of blood transfusion     Hypertension     Obesity     Osteoarthritis     PONV (postoperative nausea and vomiting)     Sleep apnea

## 2018-08-22 ENCOUNTER — OFFICE VISIT (OUTPATIENT)
Dept: ORTHOPEDIC SURGERY | Age: 78
End: 2018-08-22

## 2018-08-22 DIAGNOSIS — Z96.651 STATUS POST TOTAL RIGHT KNEE REPLACEMENT: Primary | ICD-10-CM

## 2018-08-22 PROCEDURE — 99024 POSTOP FOLLOW-UP VISIT: CPT | Performed by: ORTHOPAEDIC SURGERY

## 2018-08-24 ENCOUNTER — TELEPHONE (OUTPATIENT)
Dept: ORTHOPEDIC SURGERY | Age: 78
End: 2018-08-24

## 2018-08-24 DIAGNOSIS — M17.11 PRIMARY OSTEOARTHRITIS OF RIGHT KNEE: Primary | ICD-10-CM

## 2018-08-29 ENCOUNTER — TELEPHONE (OUTPATIENT)
Dept: ORTHOPEDIC SURGERY | Age: 78
End: 2018-08-29

## 2018-08-29 NOTE — TELEPHONE ENCOUNTER
Pt has been taking a baby asp every other morning. She has not been taking it since her surgery on 7-. When would you like her to restart? ?

## 2018-09-06 ENCOUNTER — CARE COORDINATION (OUTPATIENT)
Dept: CASE MANAGEMENT | Age: 78
End: 2018-09-06

## 2018-09-27 ENCOUNTER — HOSPITAL ENCOUNTER (OUTPATIENT)
Dept: MRI IMAGING | Age: 78
Discharge: HOME | End: 2018-09-29
Payer: MEDICARE

## 2018-09-27 DIAGNOSIS — M46.1 SACROILIITIS (HCC): ICD-10-CM

## 2018-09-27 PROCEDURE — 72148 MRI LUMBAR SPINE W/O DYE: CPT

## 2018-10-03 ENCOUNTER — OFFICE VISIT (OUTPATIENT)
Dept: ORTHOPEDIC SURGERY | Age: 78
End: 2018-10-03

## 2018-10-03 DIAGNOSIS — Z96.651 STATUS POST TOTAL RIGHT KNEE REPLACEMENT: Primary | ICD-10-CM

## 2018-10-03 PROCEDURE — 99024 POSTOP FOLLOW-UP VISIT: CPT | Performed by: ORTHOPAEDIC SURGERY

## 2018-10-03 NOTE — PROGRESS NOTES
Cheikh Ignacio M.D.            [address] [facility] [address] [facility],[address], [address] [facility]             Dept Phone: [phone]             Dept Fax:    's returns today. She status post right total knee on 7/17/18 she says her knee pains finally gotten to be a lot better. Her major issue has to do with her low back she did have an MRI which have positive findings. She seeking referral for this. Examination right knee notes the bruising sounds significant way. Parents incision is well-healed. Her motion is 0125 degrees. She has good stability throughout good patellar tracking. She has very minimal pain no calf tenderness negative Homans    Impression  10 weeks status post right total knee doing well    MOLLY  Patient is encouraged continue exercises. We'll see her back here in 3 months. In the meantime patient be referred to Dr. Henry Gunn for her low back          Review of Systems   Constitutional: Negative. HENT: Negative. Respiratory: Negative. Cardiovascular: Negative. Neurological: Negative. Musculoskeletal:  Post-Op Check (RT TKA)          Current Outpatient Prescriptions:     oxybutynin (DITROPAN XL) 15 MG extended release tablet, Take 15 mg by mouth daily, Disp: , Rfl:     diclofenac sodium 1 % GEL, Apply 2 g topically 4 times daily as needed for Pain, Disp: , Rfl:     Cetirizine HCl (ZYRTEC ALLERGY PO), Take 10 mg by mouth daily , Disp: , Rfl:     lisinopril (PRINIVIL;ZESTRIL) 5 MG tablet, Take 5 mg by mouth daily, Disp: , Rfl:     Cinnamon 500 MG TABS, Take by mouth daily, Disp: , Rfl:     diphenhydrAMINE (BENADRYL) 25 MG capsule, Take 25 mg by mouth nightly, Disp: , Rfl:     ketoconazole (NIZORAL) 2 % shampoo, Apply topically daily as needed for Itching Apply topically daily as needed. , Disp: , Rfl:     levothyroxine (SYNTHROID) 88 MCG tablet, Take 88 mcg by mouth Daily, Disp: , Rfl:     allopurinol

## 2018-10-08 ENCOUNTER — OFFICE VISIT (OUTPATIENT)
Dept: ORTHOPEDIC SURGERY | Age: 78
End: 2018-10-08
Payer: MEDICARE

## 2018-10-08 DIAGNOSIS — M43.10 ACQUIRED SPONDYLOLISTHESIS: ICD-10-CM

## 2018-10-08 DIAGNOSIS — M51.16 LUMBAR DISC HERNIATION WITH RADICULOPATHY: ICD-10-CM

## 2018-10-08 DIAGNOSIS — M48.062 LUMBAR STENOSIS WITH NEUROGENIC CLAUDICATION: ICD-10-CM

## 2018-10-08 DIAGNOSIS — G89.29 CHRONIC LOW BACK PAIN, UNSPECIFIED BACK PAIN LATERALITY, WITH SCIATICA PRESENCE UNSPECIFIED: Primary | ICD-10-CM

## 2018-10-08 DIAGNOSIS — M54.5 CHRONIC LOW BACK PAIN, UNSPECIFIED BACK PAIN LATERALITY, WITH SCIATICA PRESENCE UNSPECIFIED: Primary | ICD-10-CM

## 2018-10-08 DIAGNOSIS — M17.11 PRIMARY OSTEOARTHRITIS OF RIGHT KNEE: ICD-10-CM

## 2018-10-08 DIAGNOSIS — M54.16 LUMBAR RADICULAR PAIN: ICD-10-CM

## 2018-10-08 PROBLEM — M54.50 CHRONIC LOW BACK PAIN: Status: ACTIVE | Noted: 2018-10-08

## 2018-10-08 PROCEDURE — G8427 DOCREV CUR MEDS BY ELIG CLIN: HCPCS | Performed by: ORTHOPAEDIC SURGERY

## 2018-10-08 PROCEDURE — 1036F TOBACCO NON-USER: CPT | Performed by: ORTHOPAEDIC SURGERY

## 2018-10-08 PROCEDURE — 4040F PNEUMOC VAC/ADMIN/RCVD: CPT | Performed by: ORTHOPAEDIC SURGERY

## 2018-10-08 PROCEDURE — 1090F PRES/ABSN URINE INCON ASSESS: CPT | Performed by: ORTHOPAEDIC SURGERY

## 2018-10-08 PROCEDURE — 1101F PT FALLS ASSESS-DOCD LE1/YR: CPT | Performed by: ORTHOPAEDIC SURGERY

## 2018-10-08 PROCEDURE — G8484 FLU IMMUNIZE NO ADMIN: HCPCS | Performed by: ORTHOPAEDIC SURGERY

## 2018-10-08 PROCEDURE — G8400 PT W/DXA NO RESULTS DOC: HCPCS | Performed by: ORTHOPAEDIC SURGERY

## 2018-10-08 PROCEDURE — 99213 OFFICE O/P EST LOW 20 MIN: CPT | Performed by: ORTHOPAEDIC SURGERY

## 2018-10-08 PROCEDURE — G8417 CALC BMI ABV UP PARAM F/U: HCPCS | Performed by: ORTHOPAEDIC SURGERY

## 2018-10-08 PROCEDURE — 1123F ACP DISCUSS/DSCN MKR DOCD: CPT | Performed by: ORTHOPAEDIC SURGERY

## 2018-10-08 ASSESSMENT — ENCOUNTER SYMPTOMS: BACK PAIN: 1

## 2018-10-08 NOTE — PROGRESS NOTES
Subjective:      Patient ID: Randy Carrasco is a 69 y.o. female. Back Pain   This is a chronic problem. The current episode started more than 1 year ago. The problem occurs constantly. The problem has been gradually worsening since onset. The pain is present in the gluteal and lumbar spine. The quality of the pain is described as aching and burning. The pain radiates to the right foot. The pain is moderate. The pain is the same all the time. The symptoms are aggravated by standing, position and bending. Associated symptoms include leg pain. Risk factors include obesity and sedentary lifestyle. She has tried nothing for the symptoms. The treatment provided no relief. Patient presents with 2 lumbar spine issues    #1 low back pain with neurogenic claudication    Patient reports that this is been treated satisfactorily in the past with lumbar epidural steroid injections    #2 acute low back and right radicular leg pain    Onset well recovering from a total knee arthroplasty          Review of Systems   Musculoskeletal: Positive for back pain. All other systems reviewed and are negative. Objective:   Physical Exam   Constitutional: She is oriented to person, place, and time. She appears well-developed and well-nourished. HENT:   Head: Normocephalic and atraumatic. Eyes: Conjunctivae and EOM are normal.   Neck: Normal range of motion. Pulmonary/Chest: Effort normal. No respiratory distress. Neurological: She is alert and oriented to person, place, and time. She has normal strength. No sensory deficit. Normal gait   Skin: Skin is warm and dry. Psychiatric: Her behavior is normal. Thought content normal.   Nursing note and vitals reviewed.     Lateral flexion and extension x-rays obtained reviewed by myself in clinic today roughly a 10 mm spondylolisthesis L4 5 without significant motion on flexion and extension    MRI lumbar spine is reviewed compared with MRI from 2009 3 issues    #1

## 2018-10-11 ENCOUNTER — CARE COORDINATION (OUTPATIENT)
Dept: CASE MANAGEMENT | Age: 78
End: 2018-10-11

## 2019-01-18 ENCOUNTER — OFFICE VISIT (OUTPATIENT)
Dept: ORTHOPEDIC SURGERY | Age: 79
End: 2019-01-18
Payer: MEDICARE

## 2019-01-18 VITALS — BODY MASS INDEX: 39.8 KG/M2 | HEIGHT: 63 IN | WEIGHT: 224.65 LBS

## 2019-01-18 DIAGNOSIS — Z96.651 HISTORY OF TOTAL KNEE ARTHROPLASTY, RIGHT: Primary | ICD-10-CM

## 2019-01-18 PROCEDURE — G8427 DOCREV CUR MEDS BY ELIG CLIN: HCPCS | Performed by: ORTHOPAEDIC SURGERY

## 2019-01-18 PROCEDURE — G8400 PT W/DXA NO RESULTS DOC: HCPCS | Performed by: ORTHOPAEDIC SURGERY

## 2019-01-18 PROCEDURE — 1101F PT FALLS ASSESS-DOCD LE1/YR: CPT | Performed by: ORTHOPAEDIC SURGERY

## 2019-01-18 PROCEDURE — G8417 CALC BMI ABV UP PARAM F/U: HCPCS | Performed by: ORTHOPAEDIC SURGERY

## 2019-01-18 PROCEDURE — 1036F TOBACCO NON-USER: CPT | Performed by: ORTHOPAEDIC SURGERY

## 2019-01-18 PROCEDURE — 1123F ACP DISCUSS/DSCN MKR DOCD: CPT | Performed by: ORTHOPAEDIC SURGERY

## 2019-01-18 PROCEDURE — 1090F PRES/ABSN URINE INCON ASSESS: CPT | Performed by: ORTHOPAEDIC SURGERY

## 2019-01-18 PROCEDURE — 4040F PNEUMOC VAC/ADMIN/RCVD: CPT | Performed by: ORTHOPAEDIC SURGERY

## 2019-01-18 PROCEDURE — 99213 OFFICE O/P EST LOW 20 MIN: CPT | Performed by: ORTHOPAEDIC SURGERY

## 2019-01-18 PROCEDURE — G8484 FLU IMMUNIZE NO ADMIN: HCPCS | Performed by: ORTHOPAEDIC SURGERY

## 2019-01-18 RX ORDER — LEVOTHYROXINE SODIUM 88 UG/1
88 TABLET ORAL DAILY
COMMUNITY

## 2019-01-18 RX ORDER — ACETAMINOPHEN 650 MG/1
650 SUPPOSITORY RECTAL EVERY 4 HOURS PRN
COMMUNITY
End: 2019-01-24 | Stop reason: ALTCHOICE

## 2019-01-18 RX ORDER — CETIRIZINE HYDROCHLORIDE 10 MG/1
10 TABLET ORAL DAILY
COMMUNITY

## 2019-01-18 RX ORDER — OMEPRAZOLE 20 MG/1
40 CAPSULE, DELAYED RELEASE ORAL 2 TIMES DAILY
COMMUNITY

## 2019-01-18 RX ORDER — POLYETHYLENE GLYCOL 3350 17 G/17G
17 POWDER, FOR SOLUTION ORAL DAILY PRN
COMMUNITY

## 2019-01-18 RX ORDER — TRAMADOL HYDROCHLORIDE 50 MG/1
50 TABLET ORAL EVERY 8 HOURS PRN
Status: ON HOLD | COMMUNITY
End: 2021-10-21

## 2019-01-18 RX ORDER — TAMSULOSIN HYDROCHLORIDE 0.4 MG/1
0.4 CAPSULE ORAL DAILY
COMMUNITY
End: 2019-02-07 | Stop reason: ALTCHOICE

## 2019-01-18 RX ORDER — FAMOTIDINE 20 MG/1
20 TABLET, FILM COATED ORAL 2 TIMES DAILY
COMMUNITY
End: 2020-06-23

## 2019-01-24 ENCOUNTER — INITIAL CONSULT (OUTPATIENT)
Dept: ONCOLOGY | Age: 79
End: 2019-01-24
Payer: MEDICARE

## 2019-01-24 ENCOUNTER — HOSPITAL ENCOUNTER (OUTPATIENT)
Age: 79
Discharge: HOME | End: 2019-01-24
Payer: MEDICARE

## 2019-01-24 VITALS
SYSTOLIC BLOOD PRESSURE: 132 MMHG | WEIGHT: 211.13 LBS | HEART RATE: 95 BPM | BODY MASS INDEX: 38.85 KG/M2 | DIASTOLIC BLOOD PRESSURE: 68 MMHG | TEMPERATURE: 98.3 F | HEIGHT: 62 IN

## 2019-01-24 DIAGNOSIS — D68.9 COAGULOPATHY (HCC): ICD-10-CM

## 2019-01-24 LAB
ABSOLUTE EOS #: 0.07 K/UL (ref 0–0.44)
ABSOLUTE IMMATURE GRANULOCYTE: 0.03 K/UL (ref 0–0.3)
ABSOLUTE LYMPH #: 1.26 K/UL (ref 1.1–3.7)
ABSOLUTE MONO #: 0.74 K/UL (ref 0.1–1.2)
BASOPHILS # BLD: 1 % (ref 0–2)
BASOPHILS ABSOLUTE: 0.04 K/UL (ref 0–0.2)
DIFFERENTIAL TYPE: ABNORMAL
EOSINOPHILS RELATIVE PERCENT: 1 % (ref 1–4)
HCT VFR BLD CALC: 36.5 % (ref 36.3–47.1)
HEMOGLOBIN: 11.5 G/DL (ref 11.9–15.1)
IMMATURE GRANULOCYTES: 0 %
LYMPHOCYTES # BLD: 15 % (ref 24–43)
MCH RBC QN AUTO: 32.3 PG (ref 25.2–33.5)
MCHC RBC AUTO-ENTMCNC: 31.5 G/DL (ref 28.4–34.8)
MCV RBC AUTO: 102.5 FL (ref 82.6–102.9)
MONOCYTES # BLD: 9 % (ref 3–12)
NRBC AUTOMATED: 0 PER 100 WBC
PDW BLD-RTO: 13.8 % (ref 11.8–14.4)
PLATELET # BLD: 360 K/UL (ref 138–453)
PLATELET ESTIMATE: ABNORMAL
PMV BLD AUTO: 10.2 FL (ref 8.1–13.5)
RBC # BLD: 3.56 M/UL (ref 3.95–5.11)
RBC # BLD: ABNORMAL 10*6/UL
SEG NEUTROPHILS: 74 % (ref 36–65)
SEGMENTED NEUTROPHILS ABSOLUTE COUNT: 6.46 K/UL (ref 1.5–8.1)
WBC # BLD: 8.6 K/UL (ref 3.5–11.3)
WBC # BLD: ABNORMAL 10*3/UL

## 2019-01-24 PROCEDURE — 85025 COMPLETE CBC W/AUTO DIFF WBC: CPT

## 2019-01-24 PROCEDURE — 85730 THROMBOPLASTIN TIME PARTIAL: CPT

## 2019-01-24 PROCEDURE — 86146 BETA-2 GLYCOPROTEIN ANTIBODY: CPT

## 2019-01-24 PROCEDURE — G8400 PT W/DXA NO RESULTS DOC: HCPCS | Performed by: INTERNAL MEDICINE

## 2019-01-24 PROCEDURE — G8417 CALC BMI ABV UP PARAM F/U: HCPCS | Performed by: INTERNAL MEDICINE

## 2019-01-24 PROCEDURE — 36415 COLL VENOUS BLD VENIPUNCTURE: CPT

## 2019-01-24 PROCEDURE — 1123F ACP DISCUSS/DSCN MKR DOCD: CPT | Performed by: INTERNAL MEDICINE

## 2019-01-24 PROCEDURE — 85610 PROTHROMBIN TIME: CPT

## 2019-01-24 PROCEDURE — 99204 OFFICE O/P NEW MOD 45 MIN: CPT | Performed by: INTERNAL MEDICINE

## 2019-01-24 PROCEDURE — 1036F TOBACCO NON-USER: CPT | Performed by: INTERNAL MEDICINE

## 2019-01-24 PROCEDURE — 85613 RUSSELL VIPER VENOM DILUTED: CPT

## 2019-01-24 PROCEDURE — G8484 FLU IMMUNIZE NO ADMIN: HCPCS | Performed by: INTERNAL MEDICINE

## 2019-01-24 PROCEDURE — 4040F PNEUMOC VAC/ADMIN/RCVD: CPT | Performed by: INTERNAL MEDICINE

## 2019-01-24 PROCEDURE — 99201 HC NEW PT, E/M LEVEL 1: CPT | Performed by: INTERNAL MEDICINE

## 2019-01-24 PROCEDURE — 86147 CARDIOLIPIN ANTIBODY EA IG: CPT

## 2019-01-24 PROCEDURE — 85280 CLOT FACTOR XII HAGEMAN: CPT

## 2019-01-24 PROCEDURE — 1090F PRES/ABSN URINE INCON ASSESS: CPT | Performed by: INTERNAL MEDICINE

## 2019-01-24 PROCEDURE — 80053 COMPREHEN METABOLIC PANEL: CPT

## 2019-01-24 PROCEDURE — G8427 DOCREV CUR MEDS BY ELIG CLIN: HCPCS | Performed by: INTERNAL MEDICINE

## 2019-01-24 PROCEDURE — 85300 ANTITHROMBIN III ACTIVITY: CPT

## 2019-01-24 PROCEDURE — 1101F PT FALLS ASSESS-DOCD LE1/YR: CPT | Performed by: INTERNAL MEDICINE

## 2019-01-25 LAB
ALBUMIN SERPL-MCNC: 3.6 G/DL (ref 3.5–5.2)
ALBUMIN/GLOBULIN RATIO: 1.1 (ref 1–2.5)
ALP BLD-CCNC: 89 U/L (ref 35–104)
ALT SERPL-CCNC: 11 U/L (ref 5–33)
ANION GAP SERPL CALCULATED.3IONS-SCNC: 17 MMOL/L (ref 9–17)
AST SERPL-CCNC: 16 U/L
BILIRUB SERPL-MCNC: 0.51 MG/DL (ref 0.3–1.2)
BUN BLDV-MCNC: 8 MG/DL (ref 8–23)
BUN/CREAT BLD: ABNORMAL (ref 9–20)
CALCIUM SERPL-MCNC: 10.3 MG/DL (ref 8.6–10.4)
CHLORIDE BLD-SCNC: 100 MMOL/L (ref 98–107)
CIRCULATING ANTICOAGULANT SCREEN: NORMAL
CO2: 25 MMOL/L (ref 20–31)
CREAT SERPL-MCNC: 0.53 MG/DL (ref 0.5–0.9)
FACTOR XII ACTIVITY: 30 % (ref 35–150)
GFR AFRICAN AMERICAN: >60 ML/MIN
GFR NON-AFRICAN AMERICAN: >60 ML/MIN
GFR SERPL CREATININE-BSD FRML MDRD: ABNORMAL ML/MIN/{1.73_M2}
GFR SERPL CREATININE-BSD FRML MDRD: ABNORMAL ML/MIN/{1.73_M2}
GLUCOSE BLD-MCNC: 126 MG/DL (ref 70–99)
POTASSIUM SERPL-SCNC: 3.7 MMOL/L (ref 3.7–5.3)
SODIUM BLD-SCNC: 142 MMOL/L (ref 135–144)
TOTAL PROTEIN: 6.9 G/DL (ref 6.4–8.3)

## 2019-01-26 ENCOUNTER — APPOINTMENT (OUTPATIENT)
Dept: GENERAL RADIOLOGY | Age: 79
End: 2019-01-26
Payer: MEDICARE

## 2019-01-26 ENCOUNTER — HOSPITAL ENCOUNTER (EMERGENCY)
Age: 79
Discharge: HOME | End: 2019-01-26
Attending: EMERGENCY MEDICINE
Payer: MEDICARE

## 2019-01-26 VITALS
BODY MASS INDEX: 38.83 KG/M2 | RESPIRATION RATE: 16 BRPM | HEIGHT: 62 IN | HEART RATE: 90 BPM | OXYGEN SATURATION: 96 % | SYSTOLIC BLOOD PRESSURE: 126 MMHG | WEIGHT: 211 LBS | DIASTOLIC BLOOD PRESSURE: 70 MMHG | TEMPERATURE: 99.3 F

## 2019-01-26 DIAGNOSIS — R60.0 BILATERAL LOWER EXTREMITY EDEMA: Primary | ICD-10-CM

## 2019-01-26 DIAGNOSIS — M62.81 MUSCLE WEAKNESS: ICD-10-CM

## 2019-01-26 LAB
ABSOLUTE EOS #: 0.1 K/UL (ref 0–0.4)
ABSOLUTE IMMATURE GRANULOCYTE: ABNORMAL K/UL (ref 0–0.3)
ABSOLUTE LYMPH #: 1.2 K/UL (ref 1–4.8)
ABSOLUTE MONO #: 0.7 K/UL (ref 0.1–1.2)
ANION GAP SERPL CALCULATED.3IONS-SCNC: 14 MMOL/L (ref 9–17)
BASOPHILS # BLD: 1 % (ref 0–2)
BASOPHILS ABSOLUTE: 0.1 K/UL (ref 0–0.2)
BILIRUBIN URINE: NEGATIVE
BNP INTERPRETATION: NORMAL
BUN BLDV-MCNC: 8 MG/DL (ref 8–23)
BUN/CREAT BLD: ABNORMAL (ref 9–20)
C-REACTIVE PROTEIN: 112.5 MG/L (ref 0–5)
CALCIUM SERPL-MCNC: 10.3 MG/DL (ref 8.6–10.4)
CHLORIDE BLD-SCNC: 98 MMOL/L (ref 98–107)
CO2: 27 MMOL/L (ref 20–31)
COLOR: YELLOW
COMMENT UA: NORMAL
CREAT SERPL-MCNC: 0.48 MG/DL (ref 0.5–0.9)
DIFFERENTIAL TYPE: ABNORMAL
EKG ATRIAL RATE: 85 BPM
EKG P AXIS: 103 DEGREES
EKG P-R INTERVAL: 142 MS
EKG Q-T INTERVAL: 360 MS
EKG QRS DURATION: 90 MS
EKG QTC CALCULATION (BAZETT): 428 MS
EKG R AXIS: -26 DEGREES
EKG T AXIS: 33 DEGREES
EKG VENTRICULAR RATE: 85 BPM
EOSINOPHILS RELATIVE PERCENT: 1 % (ref 1–4)
GFR AFRICAN AMERICAN: >60 ML/MIN
GFR NON-AFRICAN AMERICAN: >60 ML/MIN
GFR SERPL CREATININE-BSD FRML MDRD: ABNORMAL ML/MIN/{1.73_M2}
GFR SERPL CREATININE-BSD FRML MDRD: ABNORMAL ML/MIN/{1.73_M2}
GLUCOSE BLD-MCNC: 119 MG/DL (ref 70–99)
GLUCOSE URINE: NEGATIVE
HCT VFR BLD CALC: 35.4 % (ref 36–46)
HEMOGLOBIN: 10.8 G/DL (ref 12–16)
IMMATURE GRANULOCYTES: ABNORMAL %
KETONES, URINE: NEGATIVE
LEUKOCYTE ESTERASE, URINE: NEGATIVE
LYMPHOCYTES # BLD: 13 % (ref 24–44)
MCH RBC QN AUTO: 30 PG (ref 26–34)
MCHC RBC AUTO-ENTMCNC: 30.5 G/DL (ref 31–37)
MCV RBC AUTO: 98.2 FL (ref 80–100)
MONOCYTES # BLD: 8 % (ref 2–11)
NITRITE, URINE: NEGATIVE
NRBC AUTOMATED: ABNORMAL PER 100 WBC
PDW BLD-RTO: 14.9 % (ref 12.5–15.4)
PH UA: 6.5 (ref 5–8)
PLATELET # BLD: 345 K/UL (ref 140–450)
PLATELET ESTIMATE: ABNORMAL
PMV BLD AUTO: 7.8 FL (ref 6–12)
POTASSIUM SERPL-SCNC: 3.3 MMOL/L (ref 3.7–5.3)
PRO-BNP: 61 PG/ML
PROTEIN UA: NEGATIVE
RBC # BLD: 3.6 M/UL (ref 4–5.2)
RBC # BLD: ABNORMAL 10*6/UL
SEDIMENTATION RATE, ERYTHROCYTE: 75 MM (ref 0–20)
SEG NEUTROPHILS: 77 % (ref 36–66)
SEGMENTED NEUTROPHILS ABSOLUTE COUNT: 6.8 K/UL (ref 1.8–7.7)
SODIUM BLD-SCNC: 139 MMOL/L (ref 135–144)
SPECIFIC GRAVITY UA: 1.01 (ref 1–1.03)
TROPONIN INTERP: NORMAL
TROPONIN T: NORMAL NG/ML
TROPONIN, HIGH SENSITIVITY: 10 NG/L (ref 0–14)
TURBIDITY: CLEAR
URINE HGB: NEGATIVE
UROBILINOGEN, URINE: NORMAL
WBC # BLD: 8.8 K/UL (ref 3.5–11)
WBC # BLD: ABNORMAL 10*3/UL

## 2019-01-26 PROCEDURE — 99284 EMERGENCY DEPT VISIT MOD MDM: CPT

## 2019-01-26 PROCEDURE — 36415 COLL VENOUS BLD VENIPUNCTURE: CPT

## 2019-01-26 PROCEDURE — 85651 RBC SED RATE NONAUTOMATED: CPT

## 2019-01-26 PROCEDURE — 85025 COMPLETE CBC W/AUTO DIFF WBC: CPT

## 2019-01-26 PROCEDURE — 83880 ASSAY OF NATRIURETIC PEPTIDE: CPT

## 2019-01-26 PROCEDURE — 84484 ASSAY OF TROPONIN QUANT: CPT

## 2019-01-26 PROCEDURE — 93005 ELECTROCARDIOGRAM TRACING: CPT

## 2019-01-26 PROCEDURE — 80048 BASIC METABOLIC PNL TOTAL CA: CPT

## 2019-01-26 PROCEDURE — 71045 X-RAY EXAM CHEST 1 VIEW: CPT

## 2019-01-26 PROCEDURE — 86140 C-REACTIVE PROTEIN: CPT

## 2019-01-26 RX ORDER — PREDNISONE 10 MG/1
20 TABLET ORAL DAILY
Qty: 30 TABLET | Refills: 0 | Status: SHIPPED | OUTPATIENT
Start: 2019-01-26 | End: 2019-02-10

## 2019-01-26 ASSESSMENT — ENCOUNTER SYMPTOMS
BACK PAIN: 1
DIARRHEA: 0
NAUSEA: 0
ABDOMINAL PAIN: 0
EYE DISCHARGE: 0
COUGH: 0
SHORTNESS OF BREATH: 0
SORE THROAT: 0
VOMITING: 0
EYE REDNESS: 0

## 2019-01-27 LAB
ANTI B2-GLYCOPROTEIN IGG: 0 SGU (ref 0–20)
ANTI B2-GLYCOPROTEIN IGM: 1 SMU (ref 0–20)

## 2019-01-28 ASSESSMENT — ENCOUNTER SYMPTOMS
RESPIRATORY NEGATIVE: 1
ALLERGIC/IMMUNOLOGIC NEGATIVE: 1
EYES NEGATIVE: 1
GASTROINTESTINAL NEGATIVE: 1
BACK PAIN: 1

## 2019-01-29 LAB
ANTICARDIOLIPIN IGA ANTIBODY: 2.8 APU
ANTICARDIOLIPIN IGG ANTIBODY: 4.2 GPU
CARDIOLIPIN AB IGM: 2.3 MPU
DILUTE RUSSELL VIPER VENOM TIME: ABNORMAL
INR BLD: 1.1
LUPUS ANTICOAG: ABNORMAL
PARTIAL THROMBOPLASTIN TIME: 39.2 SEC (ref 21.3–31.3)
PROTHROMBIN TIME: 10.8 SEC (ref 9.4–12.6)

## 2019-02-07 ENCOUNTER — OFFICE VISIT (OUTPATIENT)
Dept: ONCOLOGY | Age: 79
End: 2019-02-07
Payer: MEDICARE

## 2019-02-07 ENCOUNTER — APPOINTMENT (OUTPATIENT)
Dept: CT IMAGING | Age: 79
End: 2019-02-07
Payer: MEDICARE

## 2019-02-07 ENCOUNTER — HOSPITAL ENCOUNTER (EMERGENCY)
Age: 79
Discharge: HOME | End: 2019-02-07
Attending: EMERGENCY MEDICINE
Payer: MEDICARE

## 2019-02-07 VITALS
TEMPERATURE: 97.7 F | HEIGHT: 62 IN | WEIGHT: 214.56 LBS | DIASTOLIC BLOOD PRESSURE: 71 MMHG | RESPIRATION RATE: 16 BRPM | HEART RATE: 66 BPM | OXYGEN SATURATION: 92 % | SYSTOLIC BLOOD PRESSURE: 146 MMHG | BODY MASS INDEX: 39.48 KG/M2

## 2019-02-07 VITALS
TEMPERATURE: 98.2 F | SYSTOLIC BLOOD PRESSURE: 129 MMHG | BODY MASS INDEX: 39.31 KG/M2 | WEIGHT: 214.9 LBS | DIASTOLIC BLOOD PRESSURE: 65 MMHG | HEART RATE: 63 BPM

## 2019-02-07 DIAGNOSIS — R42 VERTIGO: Primary | ICD-10-CM

## 2019-02-07 DIAGNOSIS — D64.9 ANEMIA, UNSPECIFIED TYPE: ICD-10-CM

## 2019-02-07 DIAGNOSIS — D68.4 ACQUIRED COAGULATION FACTOR DEFICIENCY (HCC): ICD-10-CM

## 2019-02-07 DIAGNOSIS — D68.9 COAGULATION DEFECT (HCC): Primary | ICD-10-CM

## 2019-02-07 LAB
ABSOLUTE EOS #: 0.18 K/UL (ref 0–0.4)
ABSOLUTE IMMATURE GRANULOCYTE: ABNORMAL K/UL (ref 0–0.3)
ABSOLUTE LYMPH #: 2.3 K/UL (ref 1–4.8)
ABSOLUTE MONO #: 1.24 K/UL (ref 0.1–0.8)
ANION GAP SERPL CALCULATED.3IONS-SCNC: 13 MMOL/L (ref 9–17)
BASOPHILS # BLD: 0 % (ref 0–2)
BASOPHILS ABSOLUTE: 0 K/UL (ref 0–0.2)
BILIRUBIN URINE: NEGATIVE
BUN BLDV-MCNC: 17 MG/DL (ref 8–23)
BUN/CREAT BLD: ABNORMAL (ref 9–20)
CALCIUM SERPL-MCNC: 10.4 MG/DL (ref 8.6–10.4)
CHLORIDE BLD-SCNC: 99 MMOL/L (ref 98–107)
CO2: 27 MMOL/L (ref 20–31)
COLOR: YELLOW
COMMENT UA: NORMAL
CREAT SERPL-MCNC: 0.61 MG/DL (ref 0.5–0.9)
DIFFERENTIAL TYPE: ABNORMAL
EOSINOPHILS RELATIVE PERCENT: 1 % (ref 1–4)
GFR AFRICAN AMERICAN: >60 ML/MIN
GFR NON-AFRICAN AMERICAN: >60 ML/MIN
GFR SERPL CREATININE-BSD FRML MDRD: ABNORMAL ML/MIN/{1.73_M2}
GFR SERPL CREATININE-BSD FRML MDRD: ABNORMAL ML/MIN/{1.73_M2}
GLUCOSE BLD-MCNC: 152 MG/DL (ref 70–99)
GLUCOSE URINE: NEGATIVE
HCT VFR BLD CALC: 39.3 % (ref 36–46)
HEMOGLOBIN: 12.9 G/DL (ref 12–16)
IMMATURE GRANULOCYTES: ABNORMAL %
KETONES, URINE: NEGATIVE
LEUKOCYTE ESTERASE, URINE: NEGATIVE
LYMPHOCYTES # BLD: 13 % (ref 24–44)
MCH RBC QN AUTO: 32.2 PG (ref 26–34)
MCHC RBC AUTO-ENTMCNC: 32.8 G/DL (ref 31–37)
MCV RBC AUTO: 98 FL (ref 80–100)
MONOCYTES # BLD: 7 % (ref 1–7)
MORPHOLOGY: NORMAL
NITRITE, URINE: NEGATIVE
NRBC AUTOMATED: ABNORMAL PER 100 WBC
PDW BLD-RTO: 15 % (ref 12.5–15.4)
PH UA: 7 (ref 5–8)
PLATELET # BLD: 347 K/UL (ref 140–450)
PLATELET ESTIMATE: ABNORMAL
PMV BLD AUTO: 9.7 FL (ref 8–14)
POTASSIUM SERPL-SCNC: 3.8 MMOL/L (ref 3.7–5.3)
PROTEIN UA: NEGATIVE
RBC # BLD: 4.01 M/UL (ref 4–5.2)
RBC # BLD: ABNORMAL 10*6/UL
SEG NEUTROPHILS: 79 % (ref 36–66)
SEGMENTED NEUTROPHILS ABSOLUTE COUNT: 13.98 K/UL (ref 1.8–7.7)
SODIUM BLD-SCNC: 139 MMOL/L (ref 135–144)
SPECIFIC GRAVITY UA: 1.02 (ref 1–1.03)
TROPONIN INTERP: NORMAL
TROPONIN T: NORMAL NG/ML
TROPONIN, HIGH SENSITIVITY: 9 NG/L (ref 0–14)
TURBIDITY: CLEAR
URINE HGB: NEGATIVE
UROBILINOGEN, URINE: NORMAL
WBC # BLD: 17.7 K/UL (ref 3.5–11)
WBC # BLD: ABNORMAL 10*3/UL

## 2019-02-07 PROCEDURE — 36415 COLL VENOUS BLD VENIPUNCTURE: CPT

## 2019-02-07 PROCEDURE — 81003 URINALYSIS AUTO W/O SCOPE: CPT

## 2019-02-07 PROCEDURE — G8400 PT W/DXA NO RESULTS DOC: HCPCS | Performed by: INTERNAL MEDICINE

## 2019-02-07 PROCEDURE — G8417 CALC BMI ABV UP PARAM F/U: HCPCS | Performed by: INTERNAL MEDICINE

## 2019-02-07 PROCEDURE — 93005 ELECTROCARDIOGRAM TRACING: CPT

## 2019-02-07 PROCEDURE — 99284 EMERGENCY DEPT VISIT MOD MDM: CPT

## 2019-02-07 PROCEDURE — 1090F PRES/ABSN URINE INCON ASSESS: CPT | Performed by: INTERNAL MEDICINE

## 2019-02-07 PROCEDURE — 2580000003 HC RX 258: Performed by: EMERGENCY MEDICINE

## 2019-02-07 PROCEDURE — 70450 CT HEAD/BRAIN W/O DYE: CPT

## 2019-02-07 PROCEDURE — 4040F PNEUMOC VAC/ADMIN/RCVD: CPT | Performed by: INTERNAL MEDICINE

## 2019-02-07 PROCEDURE — 1123F ACP DISCUSS/DSCN MKR DOCD: CPT | Performed by: INTERNAL MEDICINE

## 2019-02-07 PROCEDURE — 1101F PT FALLS ASSESS-DOCD LE1/YR: CPT | Performed by: INTERNAL MEDICINE

## 2019-02-07 PROCEDURE — 84484 ASSAY OF TROPONIN QUANT: CPT

## 2019-02-07 PROCEDURE — G8484 FLU IMMUNIZE NO ADMIN: HCPCS | Performed by: INTERNAL MEDICINE

## 2019-02-07 PROCEDURE — 99214 OFFICE O/P EST MOD 30 MIN: CPT | Performed by: INTERNAL MEDICINE

## 2019-02-07 PROCEDURE — 85025 COMPLETE CBC W/AUTO DIFF WBC: CPT

## 2019-02-07 PROCEDURE — 6360000002 HC RX W HCPCS: Performed by: EMERGENCY MEDICINE

## 2019-02-07 PROCEDURE — G8427 DOCREV CUR MEDS BY ELIG CLIN: HCPCS | Performed by: INTERNAL MEDICINE

## 2019-02-07 PROCEDURE — 80048 BASIC METABOLIC PNL TOTAL CA: CPT

## 2019-02-07 PROCEDURE — 1036F TOBACCO NON-USER: CPT | Performed by: INTERNAL MEDICINE

## 2019-02-07 PROCEDURE — 99211 OFF/OP EST MAY X REQ PHY/QHP: CPT | Performed by: INTERNAL MEDICINE

## 2019-02-07 PROCEDURE — 96374 THER/PROPH/DIAG INJ IV PUSH: CPT

## 2019-02-07 PROCEDURE — 6370000000 HC RX 637 (ALT 250 FOR IP): Performed by: EMERGENCY MEDICINE

## 2019-02-07 RX ORDER — ONDANSETRON 4 MG/1
4 TABLET, ORALLY DISINTEGRATING ORAL EVERY 8 HOURS PRN
Qty: 12 TABLET | Refills: 0 | Status: SHIPPED | OUTPATIENT
Start: 2019-02-07

## 2019-02-07 RX ORDER — LORAZEPAM 2 MG/ML
0.5 INJECTION INTRAMUSCULAR ONCE
Status: DISCONTINUED | OUTPATIENT
Start: 2019-02-07 | End: 2019-02-07 | Stop reason: HOSPADM

## 2019-02-07 RX ORDER — DIAZEPAM 5 MG/1
5 TABLET ORAL ONCE
Status: COMPLETED | OUTPATIENT
Start: 2019-02-07 | End: 2019-02-07

## 2019-02-07 RX ORDER — 0.9 % SODIUM CHLORIDE 0.9 %
1000 INTRAVENOUS SOLUTION INTRAVENOUS ONCE
Status: COMPLETED | OUTPATIENT
Start: 2019-02-07 | End: 2019-02-07

## 2019-02-07 RX ORDER — MECLIZINE HCL 12.5 MG/1
25 TABLET ORAL ONCE
Status: COMPLETED | OUTPATIENT
Start: 2019-02-07 | End: 2019-02-07

## 2019-02-07 RX ORDER — DIAZEPAM 5 MG/1
5 TABLET ORAL EVERY 6 HOURS PRN
Qty: 12 TABLET | Refills: 0 | Status: SHIPPED | OUTPATIENT
Start: 2019-02-07 | End: 2019-02-10

## 2019-02-07 RX ORDER — MECLIZINE HYDROCHLORIDE 25 MG/1
25 TABLET ORAL 3 TIMES DAILY PRN
Qty: 15 TABLET | Refills: 0 | Status: ON HOLD | OUTPATIENT
Start: 2019-02-07 | End: 2021-10-21

## 2019-02-07 RX ORDER — ONDANSETRON 2 MG/ML
4 INJECTION INTRAMUSCULAR; INTRAVENOUS ONCE
Status: COMPLETED | OUTPATIENT
Start: 2019-02-07 | End: 2019-02-07

## 2019-02-07 RX ORDER — DIAZEPAM 5 MG/ML
2.5 INJECTION, SOLUTION INTRAMUSCULAR; INTRAVENOUS ONCE
Status: DISCONTINUED | OUTPATIENT
Start: 2019-02-07 | End: 2019-02-07

## 2019-02-07 RX ORDER — FUROSEMIDE 20 MG/1
20 TABLET ORAL DAILY
Status: ON HOLD | COMMUNITY
End: 2021-10-21

## 2019-02-07 RX ADMIN — DIAZEPAM 5 MG: 5 TABLET ORAL at 15:38

## 2019-02-07 RX ADMIN — ONDANSETRON 4 MG: 2 INJECTION INTRAMUSCULAR; INTRAVENOUS at 12:19

## 2019-02-07 RX ADMIN — MECLIZINE 25 MG: 12.5 TABLET ORAL at 12:19

## 2019-02-07 RX ADMIN — SODIUM CHLORIDE 1000 ML: 9 INJECTION, SOLUTION INTRAVENOUS at 12:20

## 2019-02-07 ASSESSMENT — ENCOUNTER SYMPTOMS
GASTROINTESTINAL NEGATIVE: 1
BACK PAIN: 1
RESPIRATORY NEGATIVE: 1
ALLERGIC/IMMUNOLOGIC NEGATIVE: 1
EYES NEGATIVE: 1

## 2019-02-08 LAB
EKG ATRIAL RATE: 67 BPM
EKG P AXIS: 36 DEGREES
EKG P-R INTERVAL: 134 MS
EKG Q-T INTERVAL: 412 MS
EKG QRS DURATION: 90 MS
EKG QTC CALCULATION (BAZETT): 435 MS
EKG R AXIS: -22 DEGREES
EKG T AXIS: 35 DEGREES
EKG VENTRICULAR RATE: 67 BPM

## 2019-02-12 ENCOUNTER — HOSPITAL ENCOUNTER (OUTPATIENT)
Age: 79
Discharge: HOME | End: 2019-02-12
Payer: MEDICARE

## 2019-02-12 DIAGNOSIS — D68.4 ACQUIRED COAGULATION FACTOR DEFICIENCY (HCC): ICD-10-CM

## 2019-02-12 DIAGNOSIS — D64.9 ANEMIA, UNSPECIFIED TYPE: ICD-10-CM

## 2019-02-12 LAB — PARTIAL THROMBOPLASTIN TIME: 25.5 SEC (ref 21.3–31.3)

## 2019-02-12 PROCEDURE — 85247 CLOT FACTOR VIII MULTIMETRIC: CPT

## 2019-02-12 PROCEDURE — 85240 CLOT FACTOR VIII AHG 1 STAGE: CPT

## 2019-02-12 PROCEDURE — 85730 THROMBOPLASTIN TIME PARTIAL: CPT

## 2019-02-12 PROCEDURE — 85270 CLOT FACTOR XI PTA: CPT

## 2019-02-12 PROCEDURE — 36415 COLL VENOUS BLD VENIPUNCTURE: CPT

## 2019-02-12 PROCEDURE — 85246 CLOT FACTOR VIII VW ANTIGEN: CPT

## 2019-02-12 PROCEDURE — 85250 CLOT FACTOR IX PTC/CHRSTMAS: CPT

## 2019-02-15 LAB
FACTOR IX ACTIVITY: 122 % (ref 50–150)
FACTOR VIII ACTIVITY: 186 % (ref 50–150)
FACTOR XI ACTIVITY: 83 % (ref 50–150)

## 2019-02-18 LAB — VON WILLEBRAND AG: 286 % (ref 50–160)

## 2019-02-20 LAB — VON WILLEBRAND MULTIMERS: NORMAL

## 2019-03-14 ENCOUNTER — OFFICE VISIT (OUTPATIENT)
Dept: ONCOLOGY | Age: 79
End: 2019-03-14
Payer: MEDICARE

## 2019-03-14 VITALS
SYSTOLIC BLOOD PRESSURE: 150 MMHG | DIASTOLIC BLOOD PRESSURE: 62 MMHG | RESPIRATION RATE: 16 BRPM | BODY MASS INDEX: 40.66 KG/M2 | HEART RATE: 81 BPM | WEIGHT: 222.3 LBS | TEMPERATURE: 98.2 F

## 2019-03-14 DIAGNOSIS — D68.4 ACQUIRED COAGULATION FACTOR DEFICIENCY (HCC): ICD-10-CM

## 2019-03-14 DIAGNOSIS — D64.9 ANEMIA, UNSPECIFIED TYPE: ICD-10-CM

## 2019-03-14 PROCEDURE — G8484 FLU IMMUNIZE NO ADMIN: HCPCS | Performed by: INTERNAL MEDICINE

## 2019-03-14 PROCEDURE — G8400 PT W/DXA NO RESULTS DOC: HCPCS | Performed by: INTERNAL MEDICINE

## 2019-03-14 PROCEDURE — 99211 OFF/OP EST MAY X REQ PHY/QHP: CPT | Performed by: INTERNAL MEDICINE

## 2019-03-14 PROCEDURE — G8427 DOCREV CUR MEDS BY ELIG CLIN: HCPCS | Performed by: INTERNAL MEDICINE

## 2019-03-14 PROCEDURE — 99214 OFFICE O/P EST MOD 30 MIN: CPT | Performed by: INTERNAL MEDICINE

## 2019-03-14 PROCEDURE — 1101F PT FALLS ASSESS-DOCD LE1/YR: CPT | Performed by: INTERNAL MEDICINE

## 2019-03-14 PROCEDURE — 4040F PNEUMOC VAC/ADMIN/RCVD: CPT | Performed by: INTERNAL MEDICINE

## 2019-03-14 PROCEDURE — 1036F TOBACCO NON-USER: CPT | Performed by: INTERNAL MEDICINE

## 2019-03-14 PROCEDURE — 1123F ACP DISCUSS/DSCN MKR DOCD: CPT | Performed by: INTERNAL MEDICINE

## 2019-03-14 PROCEDURE — 1090F PRES/ABSN URINE INCON ASSESS: CPT | Performed by: INTERNAL MEDICINE

## 2019-03-14 PROCEDURE — G8417 CALC BMI ABV UP PARAM F/U: HCPCS | Performed by: INTERNAL MEDICINE

## 2019-03-14 RX ORDER — MULTIVIT-MIN/IRON/FOLIC ACID/K 18-600-40
1 CAPSULE ORAL DAILY
COMMUNITY

## 2019-03-14 RX ORDER — CALCIUM CARBONATE 500(1250)
500 TABLET ORAL 2 TIMES DAILY
Status: ON HOLD | COMMUNITY
End: 2021-11-15 | Stop reason: HOSPADM

## 2019-03-14 RX ORDER — NIACINAMIDE 500 MG
1500 TABLET, EXTENDED RELEASE ORAL 2 TIMES DAILY
Status: ON HOLD | COMMUNITY
End: 2021-11-15 | Stop reason: HOSPADM

## 2019-03-14 RX ORDER — VITAMIN E 268 MG
400 CAPSULE ORAL DAILY
COMMUNITY

## 2019-03-14 RX ORDER — ASCORBIC ACID 500 MG
500 TABLET ORAL DAILY
COMMUNITY

## 2019-03-14 RX ORDER — VITAMIN B COMPLEX
2 TABLET ORAL DAILY
Status: ON HOLD | COMMUNITY
End: 2021-11-15 | Stop reason: HOSPADM

## 2019-03-14 RX ORDER — HYDROCORTISONE ACETATE 0.5 %
1 CREAM (GRAM) TOPICAL DAILY
Status: ON HOLD | COMMUNITY
End: 2021-11-15 | Stop reason: HOSPADM

## 2019-03-14 RX ORDER — UBIDECARENONE/VIT E ACET 100MG-5
1 CAPSULE ORAL DAILY
Status: ON HOLD | COMMUNITY
End: 2021-11-15 | Stop reason: HOSPADM

## 2019-03-14 RX ORDER — M-VIT,TX,IRON,MINS/CALC/FOLIC 27MG-0.4MG
1 TABLET ORAL DAILY
COMMUNITY

## 2019-03-14 ASSESSMENT — ENCOUNTER SYMPTOMS
BACK PAIN: 1
EYES NEGATIVE: 1
ALLERGIC/IMMUNOLOGIC NEGATIVE: 1
GASTROINTESTINAL NEGATIVE: 1
RESPIRATORY NEGATIVE: 1

## 2019-03-26 ENCOUNTER — HOSPITAL ENCOUNTER (OUTPATIENT)
Age: 79
Discharge: HOME | End: 2019-03-26
Payer: MEDICARE

## 2019-03-26 DIAGNOSIS — D64.9 ANEMIA, UNSPECIFIED TYPE: ICD-10-CM

## 2019-03-26 DIAGNOSIS — D68.4 ACQUIRED COAGULATION FACTOR DEFICIENCY (HCC): ICD-10-CM

## 2019-03-26 LAB
ABSOLUTE EOS #: 0 K/UL (ref 0–0.4)
ABSOLUTE IMMATURE GRANULOCYTE: ABNORMAL K/UL (ref 0–0.3)
ABSOLUTE LYMPH #: 1.6 K/UL (ref 1–4.8)
ABSOLUTE MONO #: 0.6 K/UL (ref 0.1–1.2)
BASOPHILS # BLD: 0 % (ref 0–2)
BASOPHILS ABSOLUTE: 0 K/UL (ref 0–0.2)
DIFFERENTIAL TYPE: ABNORMAL
EOSINOPHILS RELATIVE PERCENT: 0 % (ref 1–4)
HCT VFR BLD CALC: 41.3 % (ref 36–46)
HEMOGLOBIN: 13.7 G/DL (ref 12–16)
IMMATURE GRANULOCYTES: ABNORMAL %
LYMPHOCYTES # BLD: 17 % (ref 24–44)
MCH RBC QN AUTO: 31.9 PG (ref 26–34)
MCHC RBC AUTO-ENTMCNC: 33.2 G/DL (ref 31–37)
MCV RBC AUTO: 96.2 FL (ref 80–100)
MONOCYTES # BLD: 6 % (ref 2–11)
NRBC AUTOMATED: ABNORMAL PER 100 WBC
PARTIAL THROMBOPLASTIN TIME: 30.7 SEC (ref 21.3–31.3)
PDW BLD-RTO: 16.3 % (ref 12.5–15.4)
PLATELET # BLD: 351 K/UL (ref 140–450)
PLATELET ESTIMATE: ABNORMAL
PMV BLD AUTO: 7.8 FL (ref 6–12)
RBC # BLD: 4.29 M/UL (ref 4–5.2)
RBC # BLD: ABNORMAL 10*6/UL
SEG NEUTROPHILS: 77 % (ref 36–66)
SEGMENTED NEUTROPHILS ABSOLUTE COUNT: 7.3 K/UL (ref 1.8–7.7)
WBC # BLD: 9.5 K/UL (ref 3.5–11)
WBC # BLD: ABNORMAL 10*3/UL

## 2019-03-26 PROCEDURE — 36415 COLL VENOUS BLD VENIPUNCTURE: CPT

## 2019-03-26 PROCEDURE — 85025 COMPLETE CBC W/AUTO DIFF WBC: CPT

## 2019-03-26 PROCEDURE — 85730 THROMBOPLASTIN TIME PARTIAL: CPT

## 2019-03-28 ENCOUNTER — OFFICE VISIT (OUTPATIENT)
Dept: ONCOLOGY | Age: 79
End: 2019-03-28
Payer: MEDICARE

## 2019-03-28 VITALS
DIASTOLIC BLOOD PRESSURE: 60 MMHG | SYSTOLIC BLOOD PRESSURE: 117 MMHG | WEIGHT: 218 LBS | HEART RATE: 83 BPM | BODY MASS INDEX: 39.87 KG/M2 | TEMPERATURE: 98.3 F

## 2019-03-28 DIAGNOSIS — D68.9 COAGULOPATHY (HCC): Primary | ICD-10-CM

## 2019-03-28 PROCEDURE — 99211 OFF/OP EST MAY X REQ PHY/QHP: CPT

## 2019-03-28 PROCEDURE — 99214 OFFICE O/P EST MOD 30 MIN: CPT | Performed by: INTERNAL MEDICINE

## 2019-03-28 PROCEDURE — G8484 FLU IMMUNIZE NO ADMIN: HCPCS | Performed by: INTERNAL MEDICINE

## 2019-03-28 PROCEDURE — 4040F PNEUMOC VAC/ADMIN/RCVD: CPT | Performed by: INTERNAL MEDICINE

## 2019-03-28 PROCEDURE — 1123F ACP DISCUSS/DSCN MKR DOCD: CPT | Performed by: INTERNAL MEDICINE

## 2019-03-28 PROCEDURE — G8427 DOCREV CUR MEDS BY ELIG CLIN: HCPCS | Performed by: INTERNAL MEDICINE

## 2019-03-28 PROCEDURE — G8400 PT W/DXA NO RESULTS DOC: HCPCS | Performed by: INTERNAL MEDICINE

## 2019-03-28 PROCEDURE — G8417 CALC BMI ABV UP PARAM F/U: HCPCS | Performed by: INTERNAL MEDICINE

## 2019-03-28 PROCEDURE — 1090F PRES/ABSN URINE INCON ASSESS: CPT | Performed by: INTERNAL MEDICINE

## 2019-03-28 PROCEDURE — 1036F TOBACCO NON-USER: CPT | Performed by: INTERNAL MEDICINE

## 2019-03-28 RX ORDER — AMPICILLIN TRIHYDRATE 250 MG
500 CAPSULE ORAL DAILY
Status: ON HOLD | COMMUNITY
End: 2021-11-15 | Stop reason: HOSPADM

## 2019-03-28 RX ORDER — HYDROXYCHLOROQUINE SULFATE 200 MG/1
200 TABLET, FILM COATED ORAL 2 TIMES DAILY
COMMUNITY

## 2019-03-28 RX ORDER — VITAMIN B COMPLEX
1 CAPSULE ORAL DAILY
COMMUNITY

## 2019-03-28 ASSESSMENT — ENCOUNTER SYMPTOMS
GASTROINTESTINAL NEGATIVE: 1
RESPIRATORY NEGATIVE: 1
ALLERGIC/IMMUNOLOGIC NEGATIVE: 1
EYES NEGATIVE: 1
BACK PAIN: 1

## 2019-04-13 ASSESSMENT — KOOS JR
STANDING UPRIGHT: 0
HOW SEVERE IS YOUR KNEE STIFFNESS AFTER FIRST WAKING IN MORNING: 2
BENDING TO THE FLOOR TO PICK UP OBJECT: 1
TWISING OR PIVOTING ON KNEE: 1
GOING UP OR DOWN STAIRS: 3
STRAIGHTENING KNEE FULLY: 1
RISING FROM SITTING: 1

## 2019-04-13 ASSESSMENT — PROMIS GLOBAL HEALTH SCALE
IN GENERAL, PLEASE RATE HOW WELL YOU CARRY OUT YOUR USUAL SOCIAL ACTIVITIES (INCLUDES ACTIVITIES AT HOME, AT WORK, AND IN YOUR COMMUNITY, AND RESPONSIBILITIES AS A PARENT, CHILD, SPOUSE, EMPLOYEE, FRIEND, ETC) [ON A SCALE OF 1 (POOR) TO 5 (EXCELLENT)]?: 2
IN GENERAL, HOW WOULD YOU RATE YOUR PHYSICAL HEALTH [ON A SCALE OF 1 (POOR) TO 5 (EXCELLENT)]?: 2
IN GENERAL, WOULD YOU SAY YOUR QUALITY OF LIFE IS...[ON A SCALE OF 1 (POOR) TO 5 (EXCELLENT)]: 2
IN THE PAST 7 DAYS, HOW OFTEN HAVE YOU BEEN BOTHERED BY EMOTIONAL PROBLEMS, SUCH AS FEELING ANXIOUS, DEPRESSED, OR IRRITABLE [ON A SCALE FROM 1 (NEVER) TO 5 (ALWAYS)]?: 1
HOW IS THE PROMIS V1.1 BEING ADMINISTERED?: 2
IN GENERAL, HOW WOULD YOU RATE YOUR MENTAL HEALTH, INCLUDING YOUR MOOD AND YOUR ABILITY TO THINK [ON A SCALE OF 1 (POOR) TO 5 (EXCELLENT)]?: 3
TO WHAT EXTENT ARE YOU ABLE TO CARRY OUT YOUR EVERYDAY PHYSICAL ACTIVITIES SUCH AS WALKING, CLIMBING STAIRS, CARRYING GROCERIES, OR MOVING A CHAIR [ON A SCALE OF 1 (NOT AT ALL) TO 5 (COMPLETELY)]?: 2
IN THE PAST 7 DAYS, HOW WOULD YOU RATE YOUR PAIN ON AVERAGE [ON A SCALE FROM 0 (NO PAIN) TO 10 (WORST IMAGINABLE PAIN)]?: 3
WHO IS THE PERSON COMPLETING THE PROMIS V1.1 SURVEY?: 0
IN THE PAST 7 DAYS, HOW WOULD YOU RATE YOUR FATIGUE ON AVERAGE [ON A SCALE FROM 1 (NONE) TO 5 (VERY SEVERE)]?: 4
IN GENERAL, WOULD YOU SAY YOUR HEALTH IS...[ON A SCALE OF 1 (POOR) TO 5 (EXCELLENT)]: 2
IN GENERAL, HOW WOULD YOU RATE YOUR SATISFACTION WITH YOUR SOCIAL ACTIVITIES AND RELATIONSHIPS [ON A SCALE OF 1 (POOR) TO 5 (EXCELLENT)]?: 3

## 2019-07-10 ENCOUNTER — HOSPITAL ENCOUNTER (OUTPATIENT)
Dept: GENERAL RADIOLOGY | Age: 79
Discharge: HOME | End: 2019-07-12
Payer: MEDICARE

## 2019-07-10 ENCOUNTER — HOSPITAL ENCOUNTER (OUTPATIENT)
Age: 79
Discharge: HOME | End: 2019-07-12
Payer: MEDICARE

## 2019-07-10 DIAGNOSIS — R05.9 COUGH: ICD-10-CM

## 2019-07-10 PROCEDURE — 71046 X-RAY EXAM CHEST 2 VIEWS: CPT

## 2019-07-25 ENCOUNTER — OFFICE VISIT (OUTPATIENT)
Dept: ORTHOPEDIC SURGERY | Age: 79
End: 2019-07-25
Payer: MEDICARE

## 2019-07-25 DIAGNOSIS — M25.551 RIGHT HIP PAIN: ICD-10-CM

## 2019-07-25 DIAGNOSIS — Z96.651 HISTORY OF TOTAL KNEE ARTHROPLASTY, RIGHT: Primary | ICD-10-CM

## 2019-07-25 PROCEDURE — 99213 OFFICE O/P EST LOW 20 MIN: CPT | Performed by: ORTHOPAEDIC SURGERY

## 2019-07-25 PROCEDURE — G8417 CALC BMI ABV UP PARAM F/U: HCPCS | Performed by: ORTHOPAEDIC SURGERY

## 2019-07-25 PROCEDURE — 1123F ACP DISCUSS/DSCN MKR DOCD: CPT | Performed by: ORTHOPAEDIC SURGERY

## 2019-07-25 PROCEDURE — 20610 DRAIN/INJ JOINT/BURSA W/O US: CPT | Performed by: ORTHOPAEDIC SURGERY

## 2019-07-25 PROCEDURE — G8400 PT W/DXA NO RESULTS DOC: HCPCS | Performed by: ORTHOPAEDIC SURGERY

## 2019-07-25 PROCEDURE — G8427 DOCREV CUR MEDS BY ELIG CLIN: HCPCS | Performed by: ORTHOPAEDIC SURGERY

## 2019-07-25 PROCEDURE — 4040F PNEUMOC VAC/ADMIN/RCVD: CPT | Performed by: ORTHOPAEDIC SURGERY

## 2019-07-25 PROCEDURE — 1036F TOBACCO NON-USER: CPT | Performed by: ORTHOPAEDIC SURGERY

## 2019-07-25 PROCEDURE — 1090F PRES/ABSN URINE INCON ASSESS: CPT | Performed by: ORTHOPAEDIC SURGERY

## 2019-07-25 RX ORDER — LIDOCAINE HYDROCHLORIDE 10 MG/ML
2 INJECTION, SOLUTION EPIDURAL; INFILTRATION; INTRACAUDAL; PERINEURAL ONCE
Status: COMPLETED | OUTPATIENT
Start: 2019-07-25 | End: 2019-07-25

## 2019-07-25 RX ORDER — BETAMETHASONE SODIUM PHOSPHATE AND BETAMETHASONE ACETATE 3; 3 MG/ML; MG/ML
12 INJECTION, SUSPENSION INTRA-ARTICULAR; INTRALESIONAL; INTRAMUSCULAR; SOFT TISSUE ONCE
Status: COMPLETED | OUTPATIENT
Start: 2019-07-25 | End: 2019-07-25

## 2019-07-25 RX ORDER — BUPIVACAINE HYDROCHLORIDE 5 MG/ML
2 INJECTION, SOLUTION PERINEURAL ONCE
Status: COMPLETED | OUTPATIENT
Start: 2019-07-25 | End: 2019-07-25

## 2019-07-25 RX ADMIN — BUPIVACAINE HYDROCHLORIDE 10 MG: 5 INJECTION, SOLUTION PERINEURAL at 14:29

## 2019-07-25 RX ADMIN — BETAMETHASONE SODIUM PHOSPHATE AND BETAMETHASONE ACETATE 12 MG: 3; 3 INJECTION, SUSPENSION INTRA-ARTICULAR; INTRALESIONAL; INTRAMUSCULAR; SOFT TISSUE at 14:28

## 2019-07-25 RX ADMIN — LIDOCAINE HYDROCHLORIDE 2 ML: 10 INJECTION, SOLUTION EPIDURAL; INFILTRATION; INTRACAUDAL; PERINEURAL at 14:30

## 2019-07-25 NOTE — PROGRESS NOTES
Kike Young M.D.            [address] [facility] [address] [facility], [address] [facility]           Dept Phone: [phone]           Dept Fax:  [phone]           [place]          Dept Phone: [phone]           Dept Fax:  [phone]  Galilea List returns today status post right total knee x1 year and left total knee x8 years. She is having some pain along her lateral aspect of her right knee. She also relates a history of having low back problems. No recent injury or falls. Examination patient's left total knee notes her motion is excellent 0-125 degrees. She has good stability and good patellar tracking throughout    Examination of the right knee notes her wound is healed. She does have chronic discoloration from her previous hematoma. She has good range of motion 0-120 degrees good stability throughout. As noted the patient has significant trochanteric bursitis on the right side causing her significant discomfort. XR taken today:  Xr Knee Right (1-2 Views)    Result Date: 7/25/2019  X-rays taken today reviewed by me show standing AP lateral the patient's right knee. She is status post right total knee. Components appear to be in excellent position on both AP and lateral views. Impression  Status post right total knee 2018  Status post left total knee 2011  Trochanteric bursitis right hip    Plan  Under sterile conditions the right greater trochanter at the point of maximal tenderness was injected with lidocaine and Celestone. She tolerated procedure well. We will give her home stretching program.  Back here in 2 years  Chief Complaint   Patient presents with    Pain     Rt TKA 7/17/18        Review of Systems   Constitutional: Negative. HENT: Negative. Respiratory: Negative. Cardiovascular: Negative.    Neurological:

## 2020-02-17 ENCOUNTER — HOSPITAL ENCOUNTER (OUTPATIENT)
Dept: MAMMOGRAPHY | Age: 80
Discharge: HOME | End: 2020-02-19
Payer: MEDICARE

## 2020-02-17 PROCEDURE — 77063 BREAST TOMOSYNTHESIS BI: CPT

## 2020-02-28 ENCOUNTER — HOSPITAL ENCOUNTER (OUTPATIENT)
Age: 80
Discharge: HOME | End: 2020-02-28
Payer: MEDICARE

## 2020-02-28 LAB
ABSOLUTE EOS #: 0.09 K/UL (ref 0–0.44)
ABSOLUTE IMMATURE GRANULOCYTE: 0.03 K/UL (ref 0–0.3)
ABSOLUTE LYMPH #: 1.46 K/UL (ref 1.1–3.7)
ABSOLUTE MONO #: 0.78 K/UL (ref 0.1–1.2)
ALPHA-1 ANTITRYPSIN: 152 MG/DL (ref 90–200)
BASOPHILS # BLD: 0 % (ref 0–2)
BASOPHILS ABSOLUTE: 0.03 K/UL (ref 0–0.2)
DIFFERENTIAL TYPE: ABNORMAL
EOSINOPHILS RELATIVE PERCENT: 1 % (ref 1–4)
HCT VFR BLD CALC: 36.3 % (ref 36.3–47.1)
HEMOGLOBIN: 11.5 G/DL (ref 11.9–15.1)
IGE: 41 IU/ML
IMMATURE GRANULOCYTES: 0 %
LYMPHOCYTES # BLD: 13 % (ref 24–43)
MCH RBC QN AUTO: 33.3 PG (ref 25.2–33.5)
MCHC RBC AUTO-ENTMCNC: 31.7 G/DL (ref 28.4–34.8)
MCV RBC AUTO: 105.2 FL (ref 82.6–102.9)
MONOCYTES # BLD: 7 % (ref 3–12)
NRBC AUTOMATED: 0 PER 100 WBC
PDW BLD-RTO: 14.2 % (ref 11.8–14.4)
PLATELET # BLD: 255 K/UL (ref 138–453)
PLATELET ESTIMATE: ABNORMAL
PMV BLD AUTO: 10 FL (ref 8.1–13.5)
RBC # BLD: 3.45 M/UL (ref 3.95–5.11)
RBC # BLD: ABNORMAL 10*6/UL
SEG NEUTROPHILS: 79 % (ref 36–65)
SEGMENTED NEUTROPHILS ABSOLUTE COUNT: 9.04 K/UL (ref 1.5–8.1)
WBC # BLD: 11.4 K/UL (ref 3.5–11.3)
WBC # BLD: ABNORMAL 10*3/UL

## 2020-02-28 PROCEDURE — 82103 ALPHA-1-ANTITRYPSIN TOTAL: CPT

## 2020-02-28 PROCEDURE — 36415 COLL VENOUS BLD VENIPUNCTURE: CPT

## 2020-02-28 PROCEDURE — 82785 ASSAY OF IGE: CPT

## 2020-02-28 PROCEDURE — 85025 COMPLETE CBC W/AUTO DIFF WBC: CPT

## 2020-05-18 ENCOUNTER — HOSPITAL ENCOUNTER (OUTPATIENT)
Dept: MRI IMAGING | Age: 80
Discharge: HOME | End: 2020-05-20
Payer: MEDICARE

## 2020-05-18 ENCOUNTER — HOSPITAL ENCOUNTER (OUTPATIENT)
Age: 80
Discharge: HOME | End: 2020-05-18
Payer: MEDICARE

## 2020-05-18 LAB
ABSOLUTE EOS #: 0.06 K/UL (ref 0–0.44)
ABSOLUTE IMMATURE GRANULOCYTE: 0.04 K/UL (ref 0–0.3)
ABSOLUTE LYMPH #: 1.58 K/UL (ref 1.1–3.7)
ABSOLUTE MONO #: 0.68 K/UL (ref 0.1–1.2)
BASOPHILS # BLD: 0 % (ref 0–2)
BASOPHILS ABSOLUTE: 0.05 K/UL (ref 0–0.2)
DIFFERENTIAL TYPE: ABNORMAL
EOSINOPHILS RELATIVE PERCENT: 1 % (ref 1–4)
HCT VFR BLD CALC: 40.2 % (ref 36.3–47.1)
HEMOGLOBIN: 12.9 G/DL (ref 11.9–15.1)
IMMATURE GRANULOCYTES: 0 %
LYMPHOCYTES # BLD: 14 % (ref 24–43)
MCH RBC QN AUTO: 33.2 PG (ref 25.2–33.5)
MCHC RBC AUTO-ENTMCNC: 32.1 G/DL (ref 28.4–34.8)
MCV RBC AUTO: 103.6 FL (ref 82.6–102.9)
MONOCYTES # BLD: 6 % (ref 3–12)
NRBC AUTOMATED: 0 PER 100 WBC
PDW BLD-RTO: 14.2 % (ref 11.8–14.4)
PLATELET # BLD: 272 K/UL (ref 138–453)
PLATELET ESTIMATE: ABNORMAL
PMV BLD AUTO: 9.9 FL (ref 8.1–13.5)
RBC # BLD: 3.88 M/UL (ref 3.95–5.11)
RBC # BLD: ABNORMAL 10*6/UL
SEG NEUTROPHILS: 79 % (ref 36–65)
SEGMENTED NEUTROPHILS ABSOLUTE COUNT: 9.06 K/UL (ref 1.5–8.1)
WBC # BLD: 11.5 K/UL (ref 3.5–11.3)
WBC # BLD: ABNORMAL 10*3/UL

## 2020-05-18 PROCEDURE — 70544 MR ANGIOGRAPHY HEAD W/O DYE: CPT

## 2020-05-18 PROCEDURE — 82746 ASSAY OF FOLIC ACID SERUM: CPT

## 2020-05-18 PROCEDURE — 85025 COMPLETE CBC W/AUTO DIFF WBC: CPT

## 2020-05-18 PROCEDURE — 70547 MR ANGIOGRAPHY NECK W/O DYE: CPT

## 2020-05-18 PROCEDURE — 82607 VITAMIN B-12: CPT

## 2020-05-18 PROCEDURE — 36415 COLL VENOUS BLD VENIPUNCTURE: CPT

## 2020-05-19 LAB
FOLATE: >20 NG/ML
VITAMIN B-12: >2000 PG/ML (ref 232–1245)

## 2020-06-03 ENCOUNTER — HOSPITAL ENCOUNTER (OUTPATIENT)
Dept: MRI IMAGING | Age: 80
Discharge: HOME | End: 2020-06-05
Payer: MEDICARE

## 2020-06-03 PROCEDURE — 70551 MRI BRAIN STEM W/O DYE: CPT

## 2020-06-23 ENCOUNTER — OFFICE VISIT (OUTPATIENT)
Dept: NEUROLOGY | Age: 80
End: 2020-06-23
Payer: MEDICARE

## 2020-06-23 VITALS
DIASTOLIC BLOOD PRESSURE: 66 MMHG | HEIGHT: 62 IN | BODY MASS INDEX: 42.95 KG/M2 | WEIGHT: 233.4 LBS | TEMPERATURE: 97.9 F | HEART RATE: 66 BPM | SYSTOLIC BLOOD PRESSURE: 145 MMHG

## 2020-06-23 PROCEDURE — 99204 OFFICE O/P NEW MOD 45 MIN: CPT | Performed by: PSYCHIATRY & NEUROLOGY

## 2020-06-23 RX ORDER — DIAZEPAM 5 MG/1
5 TABLET ORAL EVERY 6 HOURS PRN
Status: ON HOLD | COMMUNITY
End: 2021-10-21

## 2020-06-23 RX ORDER — LISINOPRIL 10 MG/1
5 TABLET ORAL DAILY
Status: ON HOLD | COMMUNITY
End: 2021-11-15 | Stop reason: HOSPADM

## 2020-06-23 RX ORDER — PREDNISONE 1 MG/1
5 TABLET ORAL DAILY
Status: ON HOLD | COMMUNITY
End: 2021-11-15 | Stop reason: HOSPADM

## 2020-06-23 RX ORDER — ASPIRIN 81 MG/1
81 TABLET, CHEWABLE ORAL DAILY
COMMUNITY

## 2020-06-23 ASSESSMENT — ENCOUNTER SYMPTOMS
RESPIRATORY NEGATIVE: 1
GASTROINTESTINAL NEGATIVE: 1
ALLERGIC/IMMUNOLOGIC NEGATIVE: 1
EYES NEGATIVE: 1

## 2020-06-23 NOTE — PROGRESS NOTES
Subjective:      Patient ID: Mattie Kirkland is a 79 y.o. female. HPI   80 yo wf with dizziness which has been more prominent since this February . This first began in August of 2017 with intermittent attacks of vertigo having at that time hearing loss in right side that progressed over few weeks. ENT Dr Merly Diaz felt that this was from virus having four ear steroid injection over 4 weeks which were ineffective in reversing hearing loss . She reports prior  ER visits for disruptive dizziness in October 2017 and in February 2018 and most recently February 2019  . The dizziness will be intractable vertigo  . She reports that since February she is having more dizzy attacks coming out the blue lasting minutes to one hour . This will be continuously with nausea and vomiting accompanied by some oscillopsia . These are  5 to 6 episodes per month although in May she had 12 attacks . Episodes will last several minutes to one hour having vertigo even at rest . During episode she is unsteady . She denies any positionally triggered vertigo  . She reports nearly complete hearing loss in right ear with only minor hearing . She reports that prior to February of she had gone about one year with no dizzy attacks  . There is no headache with no focal weakness , numbness , bulbar or visual complaint . Initially she felt that antivert had some effect now taking this around the clock being not sure if it has any effect . She is using valium 5 mg PRN for dizzy attacks making her fall asleep  . She was placed yesterday on lasix 20 mg po qd for swelling of legs by cardiologist. She is undergoing vestibular rehabiliation at this time. Significant medications antivert 25 mg po qid , valium 5 mg PRN , lasix 20 mg po qd . Testing MRI of Head with bilateral chronic periventricular small vessel ischemia .  MRA of Head normal  MRA of neck with diminutive right vertebral artery      Past Medical History:   Diagnosis Date    Arthritis  Asthma     Back pain     Caffeine use     2-3 cups coffee and 2-3 cups tea and 1-2 cans pop daily    Carpal tunnel syndrome     Constipation     Dizziness     Gout     Hearing loss     History of blood transfusion     Hypertension     Obesity     Osteoarthritis     PONV (postoperative nausea and vomiting)     Sleep apnea     Thyroid disease     HYPOTHYROID    Urinary incontinence     Urine incontinence     Wears hearing aid     BILATERAL       Past Surgical History:   Procedure Laterality Date    CARPAL TUNNEL RELEASE Bilateral     CATARACT REMOVAL Bilateral     CATARACT REMOVAL WITH IMPLANT Bilateral     COLONOSCOPY      CYSTOSCOPY      and dilation    FOOT SURGERY Right     HYSTERECTOMY      JOINT REPLACEMENT Left 2011    TKA    KNEE ARTHROSCOPY Left     KNEE SURGERY Left     HI TOTAL KNEE ARTHROPLASTY Right 7/17/2018    KNEE TOTAL ARTHROPLASTY performed by Gibson Andrews MD at [address] [place] [address]         Family History   Problem Relation Age of Onset    Heart Failure Mother     Heart Disease Father        Social History     Socioeconomic History    Marital status:       Spouse name: None    Number of children: 2    Years of education: None    Highest education level: None   Occupational History    Occupation: Retired   Social Needs    Financial resource strain: None    Food insecurity     Worry: None     Inability: None    Transportation needs     Medical: None     Non-medical: None   Tobacco Use    Smoking status: Former Smoker    Smokeless tobacco: Never Used   Substance and Sexual Activity    Alcohol use: Yes     Comment: rare    Drug use: No    Sexual activity: Never   Lifestyle    Physical activity     Days per week: None     Minutes per session: None    Stress: None   Relationships    Social connections     Talks on phone: None     Gets together: None     Attends Sikhism service: None     Active member of club or organization: None times daily      furosemide (LASIX) 20 MG tablet Take 20 mg by mouth daily       ondansetron (ZOFRAN ODT) 4 MG disintegrating tablet Take 1 tablet by mouth every 8 hours as needed for Nausea 12 tablet 0    meclizine (ANTIVERT) 25 MG tablet Take 1 tablet by mouth 3 times daily as needed for Dizziness 15 tablet 0    hydrocortisone 2.5 % cream Apply topically 2 times daily Apply topically 2 times daily.  Melatonin 5 MG CAPS Take by mouth      cetirizine (ZYRTEC) 10 MG tablet Take 10 mg by mouth daily      levothyroxine (SYNTHROID) 88 MCG tablet Take 88 mcg by mouth Daily      omeprazole (PRILOSEC) 20 MG delayed release capsule Take 40 mg by mouth 2 times daily       polyethylene glycol (GLYCOLAX) packet Take 17 g by mouth daily as needed for Constipation      traMADol (ULTRAM) 50 MG tablet Take 50 mg by mouth every 8 hours as needed for Pain.  oxybutynin (DITROPAN XL) 15 MG extended release tablet Take 15 mg by mouth daily      diphenhydrAMINE (BENADRYL) 25 MG capsule Take 25 mg by mouth nightly       allopurinol (ZYLOPRIM) 300 MG tablet Take 300 mg by mouth daily       No current facility-administered medications for this visit. Allergies   Allergen Reactions    Povidone-Iodine Rash    Hydrochlorothiazide     Meperidine Itching    Naproxen Hives       Review of Systems   Constitutional: Negative. HENT: Positive for ear pain and hearing loss. Eyes: Negative. Respiratory: Negative. Cardiovascular: Positive for leg swelling. Gastrointestinal: Negative. Endocrine: Negative. Genitourinary: Positive for frequency and urgency. Musculoskeletal: Positive for gait problem. Skin: Negative. Allergic/Immunologic: Negative. Neurological: Positive for dizziness. Hematological: Negative. Psychiatric/Behavioral: Negative.         Objective:   Physical Exam  Vitals:    06/23/20 1112   BP: (!) 145/66   Pulse: 66   Temp: 97.9 °F (36.6 °C)     weight: 233 lb 6.4 oz (105.9

## 2020-06-29 ENCOUNTER — TELEPHONE (OUTPATIENT)
Dept: NEUROLOGY | Age: 80
End: 2020-06-29

## 2020-06-29 NOTE — TELEPHONE ENCOUNTER
Ayanna Tyson called in today asking about her referral to the neuro-otologist.  I checked with Rossana Sanabria. She said that Dr. Francine Jose is no longer coming to the area so the patient will have to be referred to [facility]. She will fax referral once new order is placed.

## 2020-07-02 ENCOUNTER — HOSPITAL ENCOUNTER (OUTPATIENT)
Age: 80
Discharge: HOME | End: 2020-07-02
Payer: MEDICARE

## 2020-07-02 LAB
ANION GAP SERPL CALCULATED.3IONS-SCNC: 12 MMOL/L (ref 9–17)
BUN BLDV-MCNC: 13 MG/DL (ref 8–23)
BUN/CREAT BLD: ABNORMAL (ref 9–20)
CALCIUM SERPL-MCNC: 9.6 MG/DL (ref 8.6–10.4)
CHLORIDE BLD-SCNC: 94 MMOL/L (ref 98–107)
CO2: 27 MMOL/L (ref 20–31)
CREAT SERPL-MCNC: 0.65 MG/DL (ref 0.5–0.9)
GFR AFRICAN AMERICAN: >60 ML/MIN
GFR NON-AFRICAN AMERICAN: >60 ML/MIN
GFR SERPL CREATININE-BSD FRML MDRD: ABNORMAL ML/MIN/{1.73_M2}
GFR SERPL CREATININE-BSD FRML MDRD: ABNORMAL ML/MIN/{1.73_M2}
GLUCOSE BLD-MCNC: 99 MG/DL (ref 70–99)
POTASSIUM SERPL-SCNC: 3.8 MMOL/L (ref 3.7–5.3)
SODIUM BLD-SCNC: 133 MMOL/L (ref 135–144)

## 2020-07-02 PROCEDURE — 36415 COLL VENOUS BLD VENIPUNCTURE: CPT

## 2020-07-02 PROCEDURE — 80048 BASIC METABOLIC PNL TOTAL CA: CPT

## 2020-07-23 NOTE — TELEPHONE ENCOUNTER
Angelica Noguera called the office and left a message on my VM that she had not gotten a call from [facility] . Call placed back to the patient this afternoon to give her their number. Patient stated that she was driving and asked that I call back and leave the number on her VM. Call placed back and [phone] was left on her VM.

## 2020-08-13 ENCOUNTER — TELEPHONE (OUTPATIENT)
Dept: NEUROLOGY | Age: 80
End: 2020-08-13

## 2020-08-13 NOTE — TELEPHONE ENCOUNTER
Tana singh. She said she remember she had seen Dr. Julissa Breen in the past. She looked him up and he is a Neuro-otologist and graduated from  of  and is actually seeing pt. one day a week at [facility]. She asked that a referral be sent to him. She has an appt with him on 9/16/2020.  Done

## 2020-09-15 ENCOUNTER — HOSPITAL ENCOUNTER (OUTPATIENT)
Dept: NON INVASIVE DIAGNOSTICS | Age: 80
Discharge: HOME | End: 2020-09-15
Payer: MEDICARE

## 2020-09-15 PROCEDURE — 93225 XTRNL ECG REC<48 HRS REC: CPT

## 2020-09-15 PROCEDURE — 93226 XTRNL ECG REC<48 HR SCAN A/R: CPT

## 2020-09-19 LAB
ACQUISITION DURATION: NORMAL S
AVERAGE HEART RATE: 71 BPM
FASTEST SUPRAVENTRICULAR RATE: 168 BPM
HOOKUP DATE: NORMAL
HOOKUP TIME: NORMAL
LONGEST SUPRAVENTRICULAR RATE: 106 BPM
MAX HEART RATE TIME/DATE: NORMAL
MAX HEART RATE: 117 BPM
MIN HEART RATE TIME/DATE: NORMAL
MIN HEART RATE: 48 BPM
NUMBER OF FASTEST SUPRAVENTRICULAR BEATS: 3
NUMBER OF LONGEST SUPRAVENTRICULAR BEATS: 10
NUMBER OF QRS COMPLEXES: NORMAL
NUMBER OF SUPRAVENTRICULAR BEATS IN RUNS: 112
NUMBER OF SUPRAVENTRICULAR COUPLETS: 645
NUMBER OF SUPRAVENTRICULAR ECTOPICS: 8497
NUMBER OF SUPRAVENTRICULAR ISOLATED BEATS: 7090
NUMBER OF SUPRAVENTRICULAR RUNS: 30
NUMBER OF VENTRICULAR BEATS IN RUNS: 0
NUMBER OF VENTRICULAR BIGEMINAL CYCLES: 0
NUMBER OF VENTRICULAR COUPLETS: 0
NUMBER OF VENTRICULAR ECTOPICS: 54
NUMBER OF VENTRICULAR ISOLATED BEATS: 54
NUMBER OF VENTRICULAR RUNS: 0

## 2020-11-03 PROBLEM — I10 BP (HIGH BLOOD PRESSURE): Status: RESOLVED | Noted: 2017-02-17 | Resolved: 2020-11-03

## 2020-11-10 ENCOUNTER — HOSPITAL ENCOUNTER (OUTPATIENT)
Dept: GENERAL RADIOLOGY | Age: 80
Discharge: HOME | End: 2020-11-12
Payer: MEDICARE

## 2020-11-10 ENCOUNTER — HOSPITAL ENCOUNTER (OUTPATIENT)
Age: 80
Discharge: HOME | End: 2020-11-12
Payer: MEDICARE

## 2020-11-10 PROCEDURE — 73502 X-RAY EXAM HIP UNI 2-3 VIEWS: CPT

## 2021-03-30 ENCOUNTER — HOSPITAL ENCOUNTER (OUTPATIENT)
Dept: MAMMOGRAPHY | Age: 81
Discharge: HOME | End: 2021-04-01
Payer: MEDICARE

## 2021-03-30 DIAGNOSIS — Z12.31 SCREENING MAMMOGRAM FOR HIGH-RISK PATIENT: ICD-10-CM

## 2021-03-30 PROCEDURE — 77063 BREAST TOMOSYNTHESIS BI: CPT

## 2021-05-26 ENCOUNTER — HOSPITAL ENCOUNTER (OUTPATIENT)
Dept: MRI IMAGING | Age: 81
Discharge: HOME | End: 2021-05-28
Payer: MEDICARE

## 2021-05-26 DIAGNOSIS — M54.16 LUMBAR RADICULOPATHY: ICD-10-CM

## 2021-05-26 DIAGNOSIS — M48.062 PSEUDOCLAUDICATION SYNDROME: ICD-10-CM

## 2021-05-26 PROCEDURE — 72148 MRI LUMBAR SPINE W/O DYE: CPT

## 2021-06-08 NOTE — PROGRESS NOTES
[address] [facility]    Occupational Therapy Evaluation  Date: 18  Patient Name: Nelsy Narvaez       Room: 6835/6061-04  MRN: [id number]  Account: [de-identified]   : 1940  (80 y.o.) Gender: female     Discharge Recommendations:  Home with Home health OT, Continue to assess pending progress (Pt considering SNF 2* limited support)    Referring Practitioner: Dr. Rickie Atkins  Diagnosis: R TKA 18    Treatment Diagnosis: Impaired self-care status  Past Medical History:  has a past medical history of Arthritis; Asthma; Back pain; Caffeine use; Carpal tunnel syndrome; Constipation; Gout; History of blood transfusion; Hypertension; Obesity; Osteoarthritis; PONV (postoperative nausea and vomiting); Sleep apnea; Thyroid disease; Urinary incontinence; Urine incontinence; and Wears hearing aid. Past Surgical History:   has a past surgical history that includes Tonsillectomy; Hysterectomy; knee surgery (Left); Foot surgery (Right); Cystoscopy; Cataract removal (Bilateral); joint replacement (Left, ); Knee arthroscopy (Left); Carpal tunnel release (Bilateral); Colonoscopy; Cataract removal with implant (Bilateral); and pr total knee arthroplasty (Right, 2018). Restrictions  Restrictions/Precautions: Fall Risk, Surgical Protocols  Implants present? : Metal implants (L TKA , R TKA 18)  Required Braces or Orthoses?: No     Vitals  Temp: 97.6 °F (36.4 °C)  Pulse: 90  Resp: 16  BP: 118/62  Height: 5' 3\" (160 cm)  Weight: 216 lb 4.3 oz (98.1 kg)  BMI (Calculated): 38.4  Oxygen Therapy  SpO2: 100 %  Pulse Oximeter Device Mode: Continuous  Pulse Oximeter Device Location: Left  O2 Device: None (Room air)  O2 Flow Rate (L/min): 3 L/min  Level of Consciousness: Alert    Subjective  Subjective: \"That's the plan as of now but I don't know anything for sure\"  Comments: Referring to going to a SNF at discharge before returning home.   Overall Orientation Status: Within Functional Pt scheduled for Med Onc Dr Lucio on 6/17/2021 and Rad Onc Dr Antonio on 6/24/2021. Will follow prn per referral/request.   Stand by assistance  Sit to Supine: Minimal assistance       Balance  Sitting Balance: Independent  Standing Balance: Contact guard assistance  Standing Balance  Sit to stand: Minimal assistance  Stand to sit: Contact guard assistance     Bed mobility  Supine to Sit: Stand by assistance  Sit to Supine: Minimal assistance  Scooting: Stand by assistance     Transfers  Stand Step Transfers: Contact guard assistance (3 side steps and 2 forward/backward)  Sit to stand: Minimal assistance  Stand to sit: Contact guard assistance  Transfer Comments: w/RW; Pt hesitant to bear weight through RLE but did not demo any instability when she did. Functional Activity Tolerance  Functional Activity Tolerance: Tolerates 10 - 20 min exercise with multiple rests   Assessment  Performance deficits / Impairments: Decreased functional mobility , Decreased ADL status, Decreased endurance, Decreased balance, Decreased high-level IADLs  Treatment Diagnosis: Impaired self-care status  Prognosis: Good  Decision Making: Low Complexity  Patient Education: OT POC, safety, activity promotion, transfer training, On-Q pain management system  REQUIRES OT FOLLOW UP: Yes  Discharge Recommendations: Home with Home health OT, Continue to assess pending progress (Pt considering SNF 2* limited support)  Activity Tolerance: Patient Tolerated treatment well    Goals  Patient Goals   Patient goals : Pt planning to go to SNF at discharge 2* limited support at home. Short term goals  Time Frame for Short term goals: By Discharge  Short term goal 1: Pt will actively participate in [address] - R knee. Short term goal 2: Pt will V/D good understanding of TOTAL JOINT PROGRAM - R knee. Short term goal 3: Pt will V/D good understanding of AE/DME/modified techniques for functional ADL's.   Short term goal 4: Pt will actively participate in 30 minutes of therapeutic exercise/activity to promote increased independence and safety with self-care and

## 2021-06-16 ENCOUNTER — HOSPITAL ENCOUNTER (OUTPATIENT)
Age: 81
Discharge: HOME | End: 2021-06-18
Payer: MEDICARE

## 2021-06-16 ENCOUNTER — HOSPITAL ENCOUNTER (OUTPATIENT)
Dept: GENERAL RADIOLOGY | Age: 81
Discharge: HOME | End: 2021-06-18
Payer: MEDICARE

## 2021-06-16 DIAGNOSIS — G89.29 CHRONIC LOW BACK PAIN, UNSPECIFIED BACK PAIN LATERALITY, UNSPECIFIED WHETHER SCIATICA PRESENT: ICD-10-CM

## 2021-06-16 DIAGNOSIS — M54.50 CHRONIC LOW BACK PAIN, UNSPECIFIED BACK PAIN LATERALITY, UNSPECIFIED WHETHER SCIATICA PRESENT: ICD-10-CM

## 2021-06-16 PROCEDURE — 72114 X-RAY EXAM L-S SPINE BENDING: CPT

## 2021-08-19 ENCOUNTER — OFFICE VISIT (OUTPATIENT)
Dept: ORTHOPEDIC SURGERY | Age: 81
End: 2021-08-19
Payer: MEDICARE

## 2021-08-19 VITALS — WEIGHT: 233 LBS | HEIGHT: 62 IN | RESPIRATION RATE: 12 BRPM | BODY MASS INDEX: 42.88 KG/M2

## 2021-08-19 DIAGNOSIS — M25.561 RIGHT KNEE PAIN, UNSPECIFIED CHRONICITY: Primary | ICD-10-CM

## 2021-08-19 PROCEDURE — 99213 OFFICE O/P EST LOW 20 MIN: CPT | Performed by: ORTHOPAEDIC SURGERY

## 2021-08-19 NOTE — PROGRESS NOTES
Alma Dumont M.D.            [facility] [address] [facility],[address], [address] [facility]           Dept Phone: [phone]           Dept Fax:  [phone]           [place]          Dept Phone: [phone]           Dept Fax:  [phone]      Chief Compliant:  Chief Complaint   Patient presents with    Knee Pain     right TKA DOS- 7/17/18        History of Present Illness: This is a [de-identified] y.o. female who presents to the clinic today for evaluation / follow up of status post bilateral total knees with the right being in 2018 the left in 2011. Patient states that several months ago she has some swelling to her right knee. She denies any trauma or falls. She denies any surgical or dental procedures that were uncovered. She denies any fever chills she states that actually over the last several weeks this is gone down significantly. She states that she does use Voltaren gel. She is not having locking catching giving sensation to either of her knees. .       Review of Systems   Constitutional: Negative for fever, chills, sweats. Eyes: Negative for changes in vision, or pain. HENT: Negative for ear ache, epistaxis, or sore throat. Respiratory/Cardio: Negative for Chest pain, palpitations, SOB, or cough. Gastrointestinal: Negative for abdominal pain, N/V/D. Genitourinary: Negative for dysuria, frequency, urgency, or hematuria. Neurological: Negative for headache, numbness, or weakness. Integumentary: Negative for rash, itching, laceration, or abrasion. Musculoskeletal: Positive for Knee Pain (right TKA DOS- 7/17/18)       Physical Exam:  Constitutional: Patient is oriented to person, place, and time. Patient appears well-developed and well nourished.    HENT: Negative otherwise noted  Head: Normocephalic and Atraumatic  Nose: Normal  Eyes: Conjunctivae and EOM are normal  Neck: Normal range of motion Neck supple. Respiratory/Cardio: Effort normal. No respiratory distress. Musculoskeletal: Examination the patient's right knee shows anterior discoloration with his chronic. Her knee motion is superb 0 130 degrees. She has good stability at 0 6090 degrees and good patellar tracking. There is no warmth or redness. She has a little bit of tenderness over the pes area and also may be some varicose veins in this area. Patient's left knee examination is identical    Neurological: Patient is alert and oriented to person, place, and time. Normal strenght. No sensory deficit. Skin: Skin is warm and dry  Psychiatric: Behavior is normal. Thought content normal.  Nursing note and vitals reviewed. Labs and Imaging:     XR taken today:  XR KNEE RIGHT (1-2 VIEWS)    Result Date: 8/19/2021  Rays taken they reviewed by me show standing AP of both knees and a lateral the right knee. Patient is status post bilateral total knee arthroplasty. Components appear to be in excellent position on both AP views. The lateral the right knee also shows no evidence for any component loosening or malalignment of the right total knee. Orders Placed This Encounter   Procedures    XR KNEE RIGHT (1-2 VIEWS)     Standing Status:   Future     Number of Occurrences:   1     Standing Expiration Date:   8/19/2022       Assessment and Plan:  1. Right knee pain, unspecified chronicity    2. Left total knee 2011  3. Right total knee 2018        This is a [de-identified] y.o. female who presents to the clinic today for evaluation / follow up of resolved anterior right knee pain. Status post bilateral total knees. .     Past History:    Current Outpatient Medications:     aspirin 81 MG chewable tablet, Take 81 mg by mouth daily, Disp: , Rfl:     NONFORMULARY, CBD oil, Disp: , Rfl:     diazePAM (VALIUM) 5 MG tablet, Take 5 mg by mouth every 6 hours as needed for Anxiety. , Disp: , Rfl:     Omega-3 Fatty Acids (FISH OIL PO), Take by mouth, Disp: , Rfl:     FOLIC ACID PO, Take by mouth, Disp: , Rfl:     lisinopril (PRINIVIL;ZESTRIL) 10 MG tablet, Take 5 mg by mouth daily, Disp: , Rfl:     methotrexate (RHEUMATREX) 2.5 MG chemo tablet, Take by mouth once a week 5 tablets, Disp: , Rfl:     predniSONE (DELTASONE) 5 MG tablet, Take 5 mg by mouth daily, Disp: , Rfl:     Cyanocobalamin (VITAMIN B12 PO), Take by mouth once a week, Disp: , Rfl:     VITAMIN D PO, Take 2,000 % by mouth every morning, Disp: , Rfl:     hydroxychloroquine (PLAQUENIL) 200 MG tablet, Take 200 mg by mouth 2 times daily, Disp: , Rfl:     Cinnamon 500 MG CAPS, Take 600 mg by mouth daily, Disp: , Rfl:     b complex vitamins capsule, Take 1 capsule by mouth daily, Disp: , Rfl:     TURMERIC PO, Take 720 mg by mouth 2 times daily, Disp: , Rfl:     vitamin C (ASCORBIC ACID) 500 MG tablet, Take 500 mg by mouth daily, Disp: , Rfl:     Cholecalciferol (VITAMIN D) 2000 units CAPS capsule, Take 1 capsule by mouth daily, Disp: , Rfl:     vitamin E 400 UNIT capsule, Take 400 Units by mouth daily, Disp: , Rfl:     calcium carbonate (OSCAL) 500 MG TABS tablet, Take 500 mg by mouth 2 times daily, Disp: , Rfl:     Multiple Vitamins-Minerals (THERAPEUTIC MULTIVITAMIN-MINERALS) tablet, Take 1 tablet by mouth daily, Disp: , Rfl:     Glucosamine-Chondroit-Vit C-Mn (GLUCOSAMINE 1500 COMPLEX) CAPS, Take 1 capsule by mouth daily, Disp: , Rfl:     Coenzyme Q10 (COQ10) 100 MG CAPS, Take 1 capsule by mouth daily, Disp: , Rfl:     Resveratrol 100 MG CAPS, Take 1 capsule by mouth daily, Disp: , Rfl:     Niacinamide 500 MG TBCR, Take 1,500 mg by mouth 2 times daily, Disp: , Rfl:     furosemide (LASIX) 20 MG tablet, Take 20 mg by mouth daily , Disp: , Rfl:     ondansetron (ZOFRAN ODT) 4 MG disintegrating tablet, Take 1 tablet by mouth every 8 hours as needed for Nausea, Disp: 12 tablet, Rfl: 0    meclizine (ANTIVERT) 25 MG tablet, Take 1 tablet by mouth 3 times daily as needed for Dizziness, Disp: 15 tablet, Rfl: 0    hydrocortisone 2.5 % cream, Apply topically 2 times daily Apply topically 2 times daily. , Disp: , Rfl:     Melatonin 5 MG CAPS, Take by mouth, Disp: , Rfl:     cetirizine (ZYRTEC) 10 MG tablet, Take 10 mg by mouth daily, Disp: , Rfl:     levothyroxine (SYNTHROID) 88 MCG tablet, Take 88 mcg by mouth Daily, Disp: , Rfl:     omeprazole (PRILOSEC) 20 MG delayed release capsule, Take 40 mg by mouth 2 times daily , Disp: , Rfl:     polyethylene glycol (GLYCOLAX) packet, Take 17 g by mouth daily as needed for Constipation, Disp: , Rfl:     traMADol (ULTRAM) 50 MG tablet, Take 50 mg by mouth every 8 hours as needed for Pain. , Disp: , Rfl:     oxybutynin (DITROPAN XL) 15 MG extended release tablet, Take 15 mg by mouth daily, Disp: , Rfl:     diphenhydrAMINE (BENADRYL) 25 MG capsule, Take 25 mg by mouth nightly , Disp: , Rfl:     allopurinol (ZYLOPRIM) 300 MG tablet, Take 300 mg by mouth daily, Disp: , Rfl:   Allergies   Allergen Reactions    Povidone-Iodine Rash    Hydrochlorothiazide     Meperidine Itching    Naproxen Hives     Social History     Socioeconomic History    Marital status:       Spouse name: Not on file    Number of children: 2    Years of education: Not on file    Highest education level: Not on file   Occupational History    Occupation: Retired   Tobacco Use    Smoking status: Former Smoker    Smokeless tobacco: Never Used   Vaping Use    Vaping Use: Never used   Substance and Sexual Activity    Alcohol use: Yes     Comment: rare    Drug use: No    Sexual activity: Never   Other Topics Concern    Not on file   Social History Narrative    Not on file     Social Determinants of Health     Financial Resource Strain:     Difficulty of Paying Living Expenses:    Food Insecurity:     Worried About [address] [employer] in the Last Year:     [address] in the Last Year: Transportation Needs:     Lack of Transportation (Medical):  Lack of Transportation (Non-Medical):    Physical Activity:     Days of Exercise per Week:     Minutes of Exercise per Session:    Stress:     Feeling of Stress :    Social Connections:     Frequency of Communication with Friends and Family:     Frequency of Social Gatherings with Friends and Family:     Attends Hoahaoism Services:     Active Member of Clubs or Organizations:     Attends Club or Organization Meetings:     Marital Status:    Intimate Partner Violence:     Fear of Current or Ex-Partner:     Emotionally Abused:     Physically Abused:     Sexually Abused:      Past Medical History:   Diagnosis Date    Arthritis     Asthma     Back pain     Caffeine use     2-3 cups coffee and 2-3 cups tea and 1-2 cans pop daily    Carpal tunnel syndrome     Constipation     Dizziness     Gout     Hearing loss     History of blood transfusion     Hypertension     Obesity     Osteoarthritis     PONV (postoperative nausea and vomiting)     Sleep apnea     Thyroid disease     HYPOTHYROID    Urinary incontinence     Urine incontinence     Wears hearing aid     BILATERAL     Past Surgical History:   Procedure Laterality Date    CARPAL TUNNEL RELEASE Bilateral     CATARACT REMOVAL Bilateral     CATARACT REMOVAL WITH IMPLANT Bilateral     COLONOSCOPY      CYSTOSCOPY      and dilation    FOOT SURGERY Right     HYSTERECTOMY      JOINT REPLACEMENT Left 2011    TKA    KNEE ARTHROSCOPY Left     KNEE SURGERY Left     NH TOTAL KNEE ARTHROPLASTY Right 7/17/2018    KNEE TOTAL ARTHROPLASTY performed by Ayleen Pompa MD at [facility]       Family History   Problem Relation Age of Onset    Heart Failure Mother     Heart Disease Father    Plan  Patient was advised I do not see any obvious evidence for pathology to her knees and she is happy to hear this.   She does report that she has had a 22 pound weight loss which has been intentional and there may be association with this. She has no signs of infection whatsoever and did not have any uncovered procedures. As such I think that she can continue with Voltaren gel as she may have a little inflammation of her pes as well as a varicose vein. I informed I think her knee exam as well as x-ray examination is excellent. She is happy to be is reassured this. We will see her back here 2 years time or call if she has problems prior to that time. Provider Attestation:  Nia Hernandez, personally performed the services described in this documentation. All medical record entries made by the scribe were at my direction and in my presence. I have reviewed the chart and discharge instructions and agree that the records reflect my personal performance and is accurate and complete. Sharda Barney MD. 08/19/21      Please note that this chart was generated using voice recognition Dragon dictation software. Although every effort was made to ensure the accuracy of this automated transcription, some errors in transcription may have occurred.

## 2021-10-19 ENCOUNTER — HOSPITAL ENCOUNTER (INPATIENT)
Age: 81
LOS: 5 days | Discharge: NURSING, REHAB OR LONG-TERM CARE | DRG: 690 | End: 2021-10-25
Attending: EMERGENCY MEDICINE | Admitting: INTERNAL MEDICINE
Payer: MEDICARE

## 2021-10-19 ENCOUNTER — APPOINTMENT (OUTPATIENT)
Dept: GENERAL RADIOLOGY | Age: 81
DRG: 690 | End: 2021-10-19
Payer: MEDICARE

## 2021-10-19 ENCOUNTER — APPOINTMENT (OUTPATIENT)
Dept: CT IMAGING | Age: 81
DRG: 690 | End: 2021-10-19
Payer: MEDICARE

## 2021-10-19 DIAGNOSIS — M25.511 CHRONIC PAIN OF BOTH SHOULDERS: ICD-10-CM

## 2021-10-19 DIAGNOSIS — M43.10 ACQUIRED SPONDYLOLISTHESIS: ICD-10-CM

## 2021-10-19 DIAGNOSIS — M48.062 LUMBAR STENOSIS WITH NEUROGENIC CLAUDICATION: ICD-10-CM

## 2021-10-19 DIAGNOSIS — M10.9 GOUTY ARTHROPATHY: ICD-10-CM

## 2021-10-19 DIAGNOSIS — M51.36 DDD (DEGENERATIVE DISC DISEASE), LUMBAR: ICD-10-CM

## 2021-10-19 DIAGNOSIS — M25.512 CHRONIC PAIN OF BOTH SHOULDERS: ICD-10-CM

## 2021-10-19 DIAGNOSIS — N39.0 URINARY TRACT INFECTION WITH HEMATURIA, SITE UNSPECIFIED: Primary | ICD-10-CM

## 2021-10-19 DIAGNOSIS — G89.29 CHRONIC PAIN OF BOTH SHOULDERS: ICD-10-CM

## 2021-10-19 DIAGNOSIS — R31.9 URINARY TRACT INFECTION WITH HEMATURIA, SITE UNSPECIFIED: Primary | ICD-10-CM

## 2021-10-19 DIAGNOSIS — M94.9 DISORDER OF BONE AND ARTICULAR CARTILAGE: ICD-10-CM

## 2021-10-19 DIAGNOSIS — M19.041 LOCALIZED PRIMARY OSTEOARTHRITIS OF RIGHT HAND: ICD-10-CM

## 2021-10-19 DIAGNOSIS — M89.9 DISORDER OF BONE AND ARTICULAR CARTILAGE: ICD-10-CM

## 2021-10-19 DIAGNOSIS — M51.16 LUMBAR DISC HERNIATION WITH RADICULOPATHY: ICD-10-CM

## 2021-10-19 DIAGNOSIS — R91.8 MASS OF RIGHT LUNG: ICD-10-CM

## 2021-10-19 LAB
-: ABNORMAL
ABSOLUTE EOS #: 0 K/UL (ref 0–0.4)
ABSOLUTE IMMATURE GRANULOCYTE: ABNORMAL K/UL (ref 0–0.3)
ABSOLUTE LYMPH #: 1 K/UL (ref 1–4.8)
ABSOLUTE MONO #: 1.3 K/UL (ref 0.1–1.2)
AMORPHOUS: ABNORMAL
ANION GAP SERPL CALCULATED.3IONS-SCNC: 16 MMOL/L (ref 9–17)
BACTERIA: ABNORMAL
BASOPHILS # BLD: 1 % (ref 0–2)
BASOPHILS ABSOLUTE: 0.1 K/UL (ref 0–0.2)
BILIRUBIN URINE: NEGATIVE
BNP INTERPRETATION: NORMAL
BUN BLDV-MCNC: 16 MG/DL (ref 8–23)
BUN/CREAT BLD: ABNORMAL (ref 9–20)
CALCIUM SERPL-MCNC: 10.1 MG/DL (ref 8.6–10.4)
CASTS UA: ABNORMAL /LPF
CHLORIDE BLD-SCNC: 91 MMOL/L (ref 98–107)
CO2: 25 MMOL/L (ref 20–31)
COLOR: ABNORMAL
COMMENT UA: ABNORMAL
CREAT SERPL-MCNC: 0.65 MG/DL (ref 0.5–0.9)
CRYSTALS, UA: ABNORMAL /HPF
D-DIMER QUANTITATIVE: 0.86 MG/L FEU
DIFFERENTIAL TYPE: ABNORMAL
EOSINOPHILS RELATIVE PERCENT: 0 % (ref 1–4)
EPITHELIAL CELLS UA: ABNORMAL /HPF (ref 0–5)
GFR AFRICAN AMERICAN: >60 ML/MIN
GFR NON-AFRICAN AMERICAN: >60 ML/MIN
GFR SERPL CREATININE-BSD FRML MDRD: ABNORMAL ML/MIN/{1.73_M2}
GFR SERPL CREATININE-BSD FRML MDRD: ABNORMAL ML/MIN/{1.73_M2}
GLUCOSE BLD-MCNC: 122 MG/DL (ref 70–99)
GLUCOSE URINE: NEGATIVE
HCT VFR BLD CALC: 31.9 % (ref 36–46)
HEMOGLOBIN: 10.8 G/DL (ref 12–16)
IMMATURE GRANULOCYTES: ABNORMAL %
KETONES, URINE: ABNORMAL
LEUKOCYTE ESTERASE, URINE: ABNORMAL
LYMPHOCYTES # BLD: 7 % (ref 24–44)
MCH RBC QN AUTO: 35.4 PG (ref 26–34)
MCHC RBC AUTO-ENTMCNC: 33.8 G/DL (ref 31–37)
MCV RBC AUTO: 104.7 FL (ref 80–100)
MONOCYTES # BLD: 8 % (ref 2–11)
MUCUS: ABNORMAL
NITRITE, URINE: POSITIVE
NRBC AUTOMATED: ABNORMAL PER 100 WBC
OTHER OBSERVATIONS UA: ABNORMAL
PDW BLD-RTO: 15.9 % (ref 12.5–15.4)
PH UA: 6 (ref 5–8)
PLATELET # BLD: 334 K/UL (ref 140–450)
PLATELET ESTIMATE: ABNORMAL
PMV BLD AUTO: 7.7 FL (ref 6–12)
POTASSIUM SERPL-SCNC: 4 MMOL/L (ref 3.7–5.3)
PRO-BNP: 84 PG/ML
PROTEIN UA: ABNORMAL
RBC # BLD: 3.05 M/UL (ref 4–5.2)
RBC # BLD: ABNORMAL 10*6/UL
RBC UA: ABNORMAL /HPF (ref 0–2)
RENAL EPITHELIAL, UA: ABNORMAL /HPF
SEG NEUTROPHILS: 84 % (ref 36–66)
SEGMENTED NEUTROPHILS ABSOLUTE COUNT: 13.1 K/UL (ref 1.8–7.7)
SODIUM BLD-SCNC: 132 MMOL/L (ref 135–144)
SPECIFIC GRAVITY UA: 1.02 (ref 1–1.03)
TRICHOMONAS: ABNORMAL
TROPONIN INTERP: ABNORMAL
TROPONIN T: ABNORMAL NG/ML
TROPONIN, HIGH SENSITIVITY: 20 NG/L (ref 0–14)
TURBIDITY: ABNORMAL
URINE HGB: ABNORMAL
UROBILINOGEN, URINE: NORMAL
WBC # BLD: 15.5 K/UL (ref 3.5–11)
WBC # BLD: ABNORMAL 10*3/UL
WBC UA: ABNORMAL /HPF (ref 0–5)
YEAST: ABNORMAL

## 2021-10-19 PROCEDURE — 93005 ELECTROCARDIOGRAM TRACING: CPT | Performed by: REGISTERED NURSE

## 2021-10-19 PROCEDURE — 81001 URINALYSIS AUTO W/SCOPE: CPT

## 2021-10-19 PROCEDURE — 83880 ASSAY OF NATRIURETIC PEPTIDE: CPT

## 2021-10-19 PROCEDURE — 2580000003 HC RX 258: Performed by: EMERGENCY MEDICINE

## 2021-10-19 PROCEDURE — 84484 ASSAY OF TROPONIN QUANT: CPT

## 2021-10-19 PROCEDURE — 71045 X-RAY EXAM CHEST 1 VIEW: CPT

## 2021-10-19 PROCEDURE — 36415 COLL VENOUS BLD VENIPUNCTURE: CPT

## 2021-10-19 PROCEDURE — 87086 URINE CULTURE/COLONY COUNT: CPT

## 2021-10-19 PROCEDURE — 80048 BASIC METABOLIC PNL TOTAL CA: CPT

## 2021-10-19 PROCEDURE — 73110 X-RAY EXAM OF WRIST: CPT

## 2021-10-19 PROCEDURE — 96375 TX/PRO/DX INJ NEW DRUG ADDON: CPT

## 2021-10-19 PROCEDURE — 87186 SC STD MICRODIL/AGAR DIL: CPT

## 2021-10-19 PROCEDURE — 87077 CULTURE AEROBIC IDENTIFY: CPT

## 2021-10-19 PROCEDURE — 96365 THER/PROPH/DIAG IV INF INIT: CPT

## 2021-10-19 PROCEDURE — 6370000000 HC RX 637 (ALT 250 FOR IP): Performed by: REGISTERED NURSE

## 2021-10-19 PROCEDURE — 85652 RBC SED RATE AUTOMATED: CPT

## 2021-10-19 PROCEDURE — 6360000002 HC RX W HCPCS: Performed by: EMERGENCY MEDICINE

## 2021-10-19 PROCEDURE — 99285 EMERGENCY DEPT VISIT HI MDM: CPT

## 2021-10-19 PROCEDURE — 85025 COMPLETE CBC W/AUTO DIFF WBC: CPT

## 2021-10-19 PROCEDURE — 85379 FIBRIN DEGRADATION QUANT: CPT

## 2021-10-19 RX ORDER — 0.9 % SODIUM CHLORIDE 0.9 %
500 INTRAVENOUS SOLUTION INTRAVENOUS ONCE
Status: COMPLETED | OUTPATIENT
Start: 2021-10-19 | End: 2021-10-20

## 2021-10-19 RX ORDER — 0.9 % SODIUM CHLORIDE 0.9 %
80 INTRAVENOUS SOLUTION INTRAVENOUS ONCE
Status: COMPLETED | OUTPATIENT
Start: 2021-10-19 | End: 2021-10-20

## 2021-10-19 RX ORDER — SODIUM CHLORIDE 0.9 % (FLUSH) 0.9 %
10 SYRINGE (ML) INJECTION PRN
Status: DISCONTINUED | OUTPATIENT
Start: 2021-10-19 | End: 2021-10-25 | Stop reason: HOSPADM

## 2021-10-19 RX ORDER — HYDROCODONE BITARTRATE AND ACETAMINOPHEN 5; 325 MG/1; MG/1
1 TABLET ORAL ONCE
Status: COMPLETED | OUTPATIENT
Start: 2021-10-19 | End: 2021-10-19

## 2021-10-19 RX ADMIN — HYDROCODONE BITARTRATE AND ACETAMINOPHEN 1 TABLET: 5; 325 TABLET ORAL at 21:49

## 2021-10-19 RX ADMIN — SODIUM CHLORIDE 500 ML: 9 INJECTION, SOLUTION INTRAVENOUS at 23:14

## 2021-10-19 RX ADMIN — CEFTRIAXONE SODIUM 1000 MG: 1 INJECTION, POWDER, FOR SOLUTION INTRAMUSCULAR; INTRAVENOUS at 23:55

## 2021-10-19 ASSESSMENT — PAIN DESCRIPTION - DESCRIPTORS: DESCRIPTORS: THROBBING;ACHING

## 2021-10-19 ASSESSMENT — PAIN DESCRIPTION - ORIENTATION: ORIENTATION: RIGHT;LEFT

## 2021-10-19 ASSESSMENT — PAIN DESCRIPTION - LOCATION: LOCATION: SHOULDER;WRIST

## 2021-10-19 ASSESSMENT — PAIN DESCRIPTION - PAIN TYPE: TYPE: CHRONIC PAIN;ACUTE PAIN

## 2021-10-19 ASSESSMENT — PAIN SCALES - GENERAL
PAINLEVEL_OUTOF10: 8
PAINLEVEL_OUTOF10: 9

## 2021-10-19 ASSESSMENT — ENCOUNTER SYMPTOMS
BACK PAIN: 0
RESPIRATORY NEGATIVE: 1
GASTROINTESTINAL NEGATIVE: 1
EYES NEGATIVE: 1

## 2021-10-19 ASSESSMENT — PAIN DESCRIPTION - FREQUENCY: FREQUENCY: CONTINUOUS

## 2021-10-19 NOTE — ED TRIAGE NOTES
Patient admitted to the ED room 9 via squad d/t the inability to get up out of her chair. She stated that around 1900 last night, she was doing dishes and she did not feel  Well. She has a hx of arthritis in her bilateral shoulders and right wrist. Patient's neighbor came to check on her and helped her up to the bathroom from the chair. She called the squad after that.

## 2021-10-20 ENCOUNTER — APPOINTMENT (OUTPATIENT)
Dept: CT IMAGING | Age: 81
DRG: 690 | End: 2021-10-20
Payer: MEDICARE

## 2021-10-20 PROBLEM — N30.00 ACUTE CYSTITIS WITHOUT HEMATURIA: Status: ACTIVE | Noted: 2021-10-20

## 2021-10-20 PROBLEM — R53.1 WEAKNESS: Status: ACTIVE | Noted: 2021-10-20

## 2021-10-20 PROBLEM — E66.9 OBESITY (BMI 30-39.9): Status: ACTIVE | Noted: 2021-10-20

## 2021-10-20 LAB
C-REACTIVE PROTEIN: 194.8 MG/L (ref 0–5)
EKG ATRIAL RATE: 92 BPM
EKG P AXIS: 78 DEGREES
EKG P-R INTERVAL: 130 MS
EKG Q-T INTERVAL: 356 MS
EKG QRS DURATION: 90 MS
EKG QTC CALCULATION (BAZETT): 440 MS
EKG R AXIS: -32 DEGREES
EKG T AXIS: 34 DEGREES
EKG VENTRICULAR RATE: 92 BPM
LV EF: 68 %
LVEF MODALITY: NORMAL
SARS-COV-2, RAPID: NOT DETECTED
SEDIMENTATION RATE, ERYTHROCYTE: 55 MM (ref 0–30)
SPECIMEN DESCRIPTION: NORMAL
TROPONIN INTERP: ABNORMAL
TROPONIN T: ABNORMAL NG/ML
TROPONIN, HIGH SENSITIVITY: 24 NG/L (ref 0–14)
TROPONIN, HIGH SENSITIVITY: 25 NG/L (ref 0–14)
TROPONIN, HIGH SENSITIVITY: 26 NG/L (ref 0–14)
TROPONIN, HIGH SENSITIVITY: 26 NG/L (ref 0–14)

## 2021-10-20 PROCEDURE — 6370000000 HC RX 637 (ALT 250 FOR IP): Performed by: NURSE PRACTITIONER

## 2021-10-20 PROCEDURE — APPSS180 APP SPLIT SHARED TIME > 60 MINUTES: Performed by: NURSE PRACTITIONER

## 2021-10-20 PROCEDURE — 86140 C-REACTIVE PROTEIN: CPT

## 2021-10-20 PROCEDURE — 84484 ASSAY OF TROPONIN QUANT: CPT

## 2021-10-20 PROCEDURE — 2580000003 HC RX 258: Performed by: EMERGENCY MEDICINE

## 2021-10-20 PROCEDURE — 93306 TTE W/DOPPLER COMPLETE: CPT

## 2021-10-20 PROCEDURE — 2580000003 HC RX 258: Performed by: NURSE PRACTITIONER

## 2021-10-20 PROCEDURE — 6360000004 HC RX CONTRAST MEDICATION: Performed by: EMERGENCY MEDICINE

## 2021-10-20 PROCEDURE — 99222 1ST HOSP IP/OBS MODERATE 55: CPT | Performed by: INTERNAL MEDICINE

## 2021-10-20 PROCEDURE — 93005 ELECTROCARDIOGRAM TRACING: CPT | Performed by: REGISTERED NURSE

## 2021-10-20 PROCEDURE — 36415 COLL VENOUS BLD VENIPUNCTURE: CPT

## 2021-10-20 PROCEDURE — 6360000002 HC RX W HCPCS: Performed by: NURSE PRACTITIONER

## 2021-10-20 PROCEDURE — 93005 ELECTROCARDIOGRAM TRACING: CPT | Performed by: INTERNAL MEDICINE

## 2021-10-20 PROCEDURE — 6360000002 HC RX W HCPCS: Performed by: EMERGENCY MEDICINE

## 2021-10-20 PROCEDURE — 1210000000 HC MED SURG R&B

## 2021-10-20 PROCEDURE — 71260 CT THORAX DX C+: CPT

## 2021-10-20 PROCEDURE — 87635 SARS-COV-2 COVID-19 AMP PRB: CPT

## 2021-10-20 RX ORDER — POTASSIUM CHLORIDE 7.45 MG/ML
10 INJECTION INTRAVENOUS PRN
Status: DISCONTINUED | OUTPATIENT
Start: 2021-10-20 | End: 2021-10-25 | Stop reason: HOSPADM

## 2021-10-20 RX ORDER — HYDROXYCHLOROQUINE SULFATE 200 MG/1
200 TABLET, FILM COATED ORAL 2 TIMES DAILY
Status: DISCONTINUED | OUTPATIENT
Start: 2021-10-20 | End: 2021-10-25 | Stop reason: HOSPADM

## 2021-10-20 RX ORDER — HYDROCODONE BITARTRATE AND ACETAMINOPHEN 5; 325 MG/1; MG/1
2 TABLET ORAL EVERY 4 HOURS PRN
Status: DISCONTINUED | OUTPATIENT
Start: 2021-10-20 | End: 2021-10-25 | Stop reason: HOSPADM

## 2021-10-20 RX ORDER — ATORVASTATIN CALCIUM 40 MG/1
40 TABLET, FILM COATED ORAL NIGHTLY
Status: DISCONTINUED | OUTPATIENT
Start: 2021-10-20 | End: 2021-10-25 | Stop reason: HOSPADM

## 2021-10-20 RX ORDER — SODIUM CHLORIDE 9 MG/ML
25 INJECTION, SOLUTION INTRAVENOUS PRN
Status: DISCONTINUED | OUTPATIENT
Start: 2021-10-20 | End: 2021-10-25 | Stop reason: HOSPADM

## 2021-10-20 RX ORDER — ASCORBIC ACID 500 MG
500 TABLET ORAL DAILY
Status: DISCONTINUED | OUTPATIENT
Start: 2021-10-20 | End: 2021-10-25 | Stop reason: HOSPADM

## 2021-10-20 RX ORDER — SODIUM CHLORIDE 9 MG/ML
INJECTION, SOLUTION INTRAVENOUS CONTINUOUS
Status: DISCONTINUED | OUTPATIENT
Start: 2021-10-20 | End: 2021-10-21

## 2021-10-20 RX ORDER — PANTOPRAZOLE SODIUM 40 MG/1
40 TABLET, DELAYED RELEASE ORAL
Status: DISCONTINUED | OUTPATIENT
Start: 2021-10-20 | End: 2021-10-25 | Stop reason: HOSPADM

## 2021-10-20 RX ORDER — KETOROLAC TROMETHAMINE 30 MG/ML
30 INJECTION, SOLUTION INTRAMUSCULAR; INTRAVENOUS EVERY 6 HOURS PRN
Status: DISCONTINUED | OUTPATIENT
Start: 2021-10-20 | End: 2021-10-20

## 2021-10-20 RX ORDER — CEFTRIAXONE 1 G/1
1000 INJECTION, POWDER, FOR SOLUTION INTRAMUSCULAR; INTRAVENOUS EVERY 24 HOURS
Status: DISCONTINUED | OUTPATIENT
Start: 2021-10-21 | End: 2021-10-23 | Stop reason: CLARIF

## 2021-10-20 RX ORDER — ONDANSETRON 4 MG/1
4 TABLET, ORALLY DISINTEGRATING ORAL EVERY 8 HOURS PRN
Status: DISCONTINUED | OUTPATIENT
Start: 2021-10-20 | End: 2021-10-25 | Stop reason: HOSPADM

## 2021-10-20 RX ORDER — DIAZEPAM 5 MG/1
5 TABLET ORAL EVERY 6 HOURS PRN
Status: DISCONTINUED | OUTPATIENT
Start: 2021-10-20 | End: 2021-10-25 | Stop reason: HOSPADM

## 2021-10-20 RX ORDER — HYDROCORTISONE ACETATE 0.5 %
1 CREAM (GRAM) TOPICAL DAILY
Status: DISCONTINUED | OUTPATIENT
Start: 2021-10-20 | End: 2021-10-20 | Stop reason: CLARIF

## 2021-10-20 RX ORDER — PREDNISONE 10 MG/1
10 TABLET ORAL DAILY
Status: DISCONTINUED | OUTPATIENT
Start: 2021-10-21 | End: 2021-10-25 | Stop reason: HOSPADM

## 2021-10-20 RX ORDER — FUROSEMIDE 20 MG/1
20 TABLET ORAL DAILY
Status: DISCONTINUED | OUTPATIENT
Start: 2021-10-20 | End: 2021-10-22

## 2021-10-20 RX ORDER — ACETAMINOPHEN 650 MG/1
650 SUPPOSITORY RECTAL EVERY 6 HOURS PRN
Status: DISCONTINUED | OUTPATIENT
Start: 2021-10-20 | End: 2021-10-25 | Stop reason: HOSPADM

## 2021-10-20 RX ORDER — DIPHENHYDRAMINE HCL 25 MG
25 TABLET ORAL NIGHTLY
Status: DISCONTINUED | OUTPATIENT
Start: 2021-10-20 | End: 2021-10-25 | Stop reason: HOSPADM

## 2021-10-20 RX ORDER — MAGNESIUM SULFATE 1 G/100ML
1000 INJECTION INTRAVENOUS PRN
Status: DISCONTINUED | OUTPATIENT
Start: 2021-10-20 | End: 2021-10-25 | Stop reason: HOSPADM

## 2021-10-20 RX ORDER — PREDNISONE 1 MG/1
5 TABLET ORAL DAILY
Status: DISCONTINUED | OUTPATIENT
Start: 2021-10-20 | End: 2021-10-20

## 2021-10-20 RX ORDER — VITAMIN B COMPLEX
1 TABLET ORAL DAILY
Status: DISCONTINUED | OUTPATIENT
Start: 2021-10-20 | End: 2021-10-20 | Stop reason: CLARIF

## 2021-10-20 RX ORDER — VITAMIN B COMPLEX
2 TABLET ORAL DAILY
Status: DISCONTINUED | OUTPATIENT
Start: 2021-10-20 | End: 2021-10-25 | Stop reason: HOSPADM

## 2021-10-20 RX ORDER — LEVOTHYROXINE SODIUM 88 UG/1
88 TABLET ORAL DAILY
Status: DISCONTINUED | OUTPATIENT
Start: 2021-10-20 | End: 2021-10-25 | Stop reason: HOSPADM

## 2021-10-20 RX ORDER — UBIDECARENONE/VIT E ACET 100MG-5
1 CAPSULE ORAL DAILY
Status: DISCONTINUED | OUTPATIENT
Start: 2021-10-20 | End: 2021-10-20 | Stop reason: CLARIF

## 2021-10-20 RX ORDER — AMPICILLIN TRIHYDRATE 250 MG
600 CAPSULE ORAL DAILY
Status: DISCONTINUED | OUTPATIENT
Start: 2021-10-20 | End: 2021-10-20 | Stop reason: CLARIF

## 2021-10-20 RX ORDER — SODIUM CHLORIDE 0.9 % (FLUSH) 0.9 %
5-40 SYRINGE (ML) INJECTION EVERY 12 HOURS SCHEDULED
Status: DISCONTINUED | OUTPATIENT
Start: 2021-10-20 | End: 2021-10-25 | Stop reason: HOSPADM

## 2021-10-20 RX ORDER — SODIUM CHLORIDE 0.9 % (FLUSH) 0.9 %
10 SYRINGE (ML) INJECTION PRN
Status: DISCONTINUED | OUTPATIENT
Start: 2021-10-20 | End: 2021-10-25 | Stop reason: HOSPADM

## 2021-10-20 RX ORDER — MECLIZINE HCL 12.5 MG/1
25 TABLET ORAL 3 TIMES DAILY PRN
Status: DISCONTINUED | OUTPATIENT
Start: 2021-10-20 | End: 2021-10-25 | Stop reason: HOSPADM

## 2021-10-20 RX ORDER — OXYBUTYNIN CHLORIDE 5 MG/1
15 TABLET, EXTENDED RELEASE ORAL DAILY
Status: DISCONTINUED | OUTPATIENT
Start: 2021-10-20 | End: 2021-10-25 | Stop reason: HOSPADM

## 2021-10-20 RX ORDER — VITAMIN C
1 TAB ORAL DAILY
Status: DISCONTINUED | OUTPATIENT
Start: 2021-10-20 | End: 2021-10-25 | Stop reason: HOSPADM

## 2021-10-20 RX ORDER — MORPHINE SULFATE 4 MG/ML
4 INJECTION, SOLUTION INTRAMUSCULAR; INTRAVENOUS ONCE
Status: COMPLETED | OUTPATIENT
Start: 2021-10-20 | End: 2021-10-20

## 2021-10-20 RX ORDER — PREDNISONE 1 MG/1
5 TABLET ORAL ONCE
Status: DISCONTINUED | OUTPATIENT
Start: 2021-10-20 | End: 2021-10-25 | Stop reason: HOSPADM

## 2021-10-20 RX ORDER — LISINOPRIL 5 MG/1
5 TABLET ORAL DAILY
Status: DISCONTINUED | OUTPATIENT
Start: 2021-10-20 | End: 2021-10-21

## 2021-10-20 RX ORDER — POLYETHYLENE GLYCOL 3350 17 G/17G
17 POWDER, FOR SOLUTION ORAL DAILY PRN
Status: DISCONTINUED | OUTPATIENT
Start: 2021-10-20 | End: 2021-10-25 | Stop reason: HOSPADM

## 2021-10-20 RX ORDER — ALLOPURINOL 300 MG/1
300 TABLET ORAL DAILY
Status: DISCONTINUED | OUTPATIENT
Start: 2021-10-20 | End: 2021-10-25 | Stop reason: HOSPADM

## 2021-10-20 RX ORDER — NITROGLYCERIN 0.4 MG/1
0.4 TABLET SUBLINGUAL EVERY 5 MIN PRN
Status: DISCONTINUED | OUTPATIENT
Start: 2021-10-20 | End: 2021-10-25 | Stop reason: HOSPADM

## 2021-10-20 RX ORDER — ACETAMINOPHEN 325 MG/1
650 TABLET ORAL EVERY 6 HOURS PRN
Status: DISCONTINUED | OUTPATIENT
Start: 2021-10-20 | End: 2021-10-25 | Stop reason: HOSPADM

## 2021-10-20 RX ORDER — M-VIT,TX,IRON,MINS/CALC/FOLIC 27MG-0.4MG
1 TABLET ORAL DAILY
Status: DISCONTINUED | OUTPATIENT
Start: 2021-10-20 | End: 2021-10-25 | Stop reason: HOSPADM

## 2021-10-20 RX ORDER — CETIRIZINE HYDROCHLORIDE 10 MG/1
5 TABLET ORAL DAILY
Status: DISCONTINUED | OUTPATIENT
Start: 2021-10-20 | End: 2021-10-25 | Stop reason: HOSPADM

## 2021-10-20 RX ORDER — HYDROCODONE BITARTRATE AND ACETAMINOPHEN 5; 325 MG/1; MG/1
1 TABLET ORAL EVERY 4 HOURS PRN
Status: DISCONTINUED | OUTPATIENT
Start: 2021-10-20 | End: 2021-10-25 | Stop reason: HOSPADM

## 2021-10-20 RX ORDER — CALCIUM CARBONATE 200(500)MG
500 TABLET,CHEWABLE ORAL 2 TIMES DAILY
Status: DISCONTINUED | OUTPATIENT
Start: 2021-10-20 | End: 2021-10-25 | Stop reason: HOSPADM

## 2021-10-20 RX ORDER — NIACINAMIDE 500 MG
1500 TABLET, EXTENDED RELEASE ORAL 2 TIMES DAILY
Status: DISCONTINUED | OUTPATIENT
Start: 2021-10-20 | End: 2021-10-20 | Stop reason: CLARIF

## 2021-10-20 RX ORDER — POTASSIUM CHLORIDE 20 MEQ/1
40 TABLET, EXTENDED RELEASE ORAL PRN
Status: DISCONTINUED | OUTPATIENT
Start: 2021-10-20 | End: 2021-10-25 | Stop reason: HOSPADM

## 2021-10-20 RX ADMIN — SODIUM CHLORIDE, PRESERVATIVE FREE 10 ML: 5 INJECTION INTRAVENOUS at 08:41

## 2021-10-20 RX ADMIN — HYDROXYCHLOROQUINE SULFATE 200 MG: 200 TABLET, FILM COATED ORAL at 08:41

## 2021-10-20 RX ADMIN — LEVOTHYROXINE SODIUM 88 MCG: 88 TABLET ORAL at 08:40

## 2021-10-20 RX ADMIN — CETIRIZINE HYDROCHLORIDE 5 MG: 10 TABLET, FILM COATED ORAL at 08:41

## 2021-10-20 RX ADMIN — VITAMIN C 1 TABLET: TAB at 08:40

## 2021-10-20 RX ADMIN — Medication 2000 UNITS: at 08:40

## 2021-10-20 RX ADMIN — OXYCODONE HYDROCHLORIDE AND ACETAMINOPHEN 500 MG: 500 TABLET ORAL at 08:41

## 2021-10-20 RX ADMIN — SODIUM CHLORIDE 80 ML: 9 INJECTION, SOLUTION INTRAVENOUS at 01:36

## 2021-10-20 RX ADMIN — CALCIUM CARBONATE 500 MG: 500 TABLET, CHEWABLE ORAL at 08:40

## 2021-10-20 RX ADMIN — ALLOPURINOL 300 MG: 300 TABLET ORAL at 08:41

## 2021-10-20 RX ADMIN — ASPIRIN 325 MG: 325 TABLET, COATED ORAL at 08:40

## 2021-10-20 RX ADMIN — ENOXAPARIN SODIUM 40 MG: 40 INJECTION SUBCUTANEOUS at 08:39

## 2021-10-20 RX ADMIN — SODIUM CHLORIDE: 9 INJECTION, SOLUTION INTRAVENOUS at 20:20

## 2021-10-20 RX ADMIN — HYDROCODONE BITARTRATE AND ACETAMINOPHEN 2 TABLET: 5; 325 TABLET ORAL at 16:59

## 2021-10-20 RX ADMIN — SODIUM CHLORIDE, PRESERVATIVE FREE 10 ML: 5 INJECTION INTRAVENOUS at 01:38

## 2021-10-20 RX ADMIN — SODIUM CHLORIDE: 9 INJECTION, SOLUTION INTRAVENOUS at 08:39

## 2021-10-20 RX ADMIN — PANTOPRAZOLE SODIUM 40 MG: 40 TABLET, DELAYED RELEASE ORAL at 08:40

## 2021-10-20 RX ADMIN — ATORVASTATIN CALCIUM 40 MG: 40 TABLET, FILM COATED ORAL at 20:20

## 2021-10-20 RX ADMIN — VITAMIN E CAP 100 UNIT 400 UNITS: 100 CAP at 10:38

## 2021-10-20 RX ADMIN — HYDROCODONE BITARTRATE AND ACETAMINOPHEN 2 TABLET: 5; 325 TABLET ORAL at 13:03

## 2021-10-20 RX ADMIN — MORPHINE SULFATE 4 MG: 4 INJECTION INTRAVENOUS at 01:06

## 2021-10-20 RX ADMIN — LISINOPRIL 5 MG: 5 TABLET ORAL at 08:41

## 2021-10-20 RX ADMIN — PREDNISONE 5 MG: 5 TABLET ORAL at 08:41

## 2021-10-20 RX ADMIN — HYDROMORPHONE HYDROCHLORIDE 1 MG: 1 INJECTION, SOLUTION INTRAMUSCULAR; INTRAVENOUS; SUBCUTANEOUS at 03:52

## 2021-10-20 RX ADMIN — SODIUM CHLORIDE, PRESERVATIVE FREE 10 ML: 5 INJECTION INTRAVENOUS at 20:22

## 2021-10-20 RX ADMIN — HYDROXYCHLOROQUINE SULFATE 200 MG: 200 TABLET, FILM COATED ORAL at 20:20

## 2021-10-20 RX ADMIN — OXYBUTYNIN CHLORIDE 15 MG: 5 TABLET, EXTENDED RELEASE ORAL at 08:40

## 2021-10-20 RX ADMIN — DIPHENHYDRAMINE HYDROCHLORIDE 25 MG: 25 TABLET ORAL at 20:20

## 2021-10-20 RX ADMIN — MULTIPLE VITAMINS W/ MINERALS TAB 1 TABLET: TAB at 08:41

## 2021-10-20 RX ADMIN — IOPAMIDOL 75 ML: 755 INJECTION, SOLUTION INTRAVENOUS at 01:38

## 2021-10-20 RX ADMIN — HYDROCODONE BITARTRATE AND ACETAMINOPHEN 2 TABLET: 5; 325 TABLET ORAL at 08:40

## 2021-10-20 RX ADMIN — HYDROCODONE BITARTRATE AND ACETAMINOPHEN 2 TABLET: 5; 325 TABLET ORAL at 22:45

## 2021-10-20 ASSESSMENT — ENCOUNTER SYMPTOMS
VOMITING: 0
STRIDOR: 0
ABDOMINAL PAIN: 0
CONSTIPATION: 0
DIARRHEA: 0
NAUSEA: 0
SHORTNESS OF BREATH: 1
WHEEZING: 0
COUGH: 0
BLOOD IN STOOL: 0

## 2021-10-20 ASSESSMENT — PAIN SCALES - GENERAL
PAINLEVEL_OUTOF10: 6
PAINLEVEL_OUTOF10: 8
PAINLEVEL_OUTOF10: 8
PAINLEVEL_OUTOF10: 7
PAINLEVEL_OUTOF10: 6
PAINLEVEL_OUTOF10: 8
PAINLEVEL_OUTOF10: 8
PAINLEVEL_OUTOF10: 5
PAINLEVEL_OUTOF10: 8
PAINLEVEL_OUTOF10: 0
PAINLEVEL_OUTOF10: 2

## 2021-10-20 ASSESSMENT — PAIN DESCRIPTION - ORIENTATION: ORIENTATION: RIGHT;LEFT

## 2021-10-20 ASSESSMENT — PAIN DESCRIPTION - PAIN TYPE
TYPE: CHRONIC PAIN

## 2021-10-20 ASSESSMENT — PAIN DESCRIPTION - FREQUENCY: FREQUENCY: CONTINUOUS

## 2021-10-20 ASSESSMENT — PAIN DESCRIPTION - DESCRIPTORS: DESCRIPTORS: ACHING

## 2021-10-20 ASSESSMENT — PAIN DESCRIPTION - LOCATION
LOCATION: WRIST
LOCATION: SHOULDER;WRIST

## 2021-10-20 ASSESSMENT — PAIN DESCRIPTION - ONSET: ONSET: ON-GOING

## 2021-10-20 ASSESSMENT — PAIN DESCRIPTION - PROGRESSION: CLINICAL_PROGRESSION: GRADUALLY WORSENING

## 2021-10-20 NOTE — ED PROVIDER NOTES
[id number] [facility]  [address] [facility] [place] [address]  Phone: [phone]  Fax: [phone]        Pt Name: Tracey Conklin  MRN: [id number]  [place] 1940  Date of evaluation: 10/19/21    [address]       Chief Complaint   Patient presents with    Pain     pt c/o arthritic pain in bilateral shoulders and bilateral wrists. Patient unable to get up the whole night and day    Dizziness       HISTORY OF PRESENT ILLNESS (Location/Symptom, Timing/Onset, Context/Setting, Quality, Duration, Modifying Factors, Severity)      Tracey Conklin is a [de-identified] y.o. female  who presents to the ED via ems with bilateral shoulder pain and weakness. Patient states that last night she was doing dishes when she became dizzy, they walked to her chair to sit down to relax. States that her bilateral shoulder pain she was unable to get out of her chair until her next-door neighbor came over today and help drive a chair. Patient states that she has had bilateral shoulder pain ongoing for months and has been diagnosed with bilateral arthritis. She denies any focal deficits, chest pain, shortness of breath, abdominal pain, nausea vomiting diarrhea diarrhea. Per EMS report, there EKG showed A. fib, patient denies any history of A. fib therefore this is a new onset at this time. PAST MEDICAL / SURGICAL / SOCIAL / FAMILY HISTORY     PMH:  has a past medical history of Arthritis, Asthma, Back pain, Caffeine use, Carpal tunnel syndrome, Constipation, Dizziness, Gout, Hearing loss, History of blood transfusion, Hypertension, Obesity, Osteoarthritis, PONV (postoperative nausea and vomiting), Sleep apnea, Thyroid disease, Urinary incontinence, Urine incontinence, and Wears hearing aid. Surgical History:  has a past surgical history that includes Tonsillectomy; Hysterectomy; knee surgery (Left); Foot surgery (Right); Cystoscopy; Cataract removal (Bilateral); joint replacement (Left, 2011);  Knee arthroscopy (Left); Carpal tunnel release (Bilateral); Colonoscopy; Cataract removal with implant (Bilateral); and pr total knee arthroplasty (Right, 2018). Social History:  reports that she has quit smoking. She has never used smokeless tobacco. She reports current alcohol use. She reports that she does not use drugs. Family History: She indicated that her mother is . She indicated that her father is . family history includes Heart Disease in her father; Heart Failure in her mother. Psychiatric History: None    Allergies: Povidone-iodine, Hydrochlorothiazide, Meperidine, and Naproxen    Home Medications:   Prior to Admission medications    Medication Sig Start Date End Date Taking? Authorizing Provider   metFORMIN (GLUCOPHAGE) 500 MG tablet Take 500 mg by mouth 2 times daily (with meals) Pt takes for appetite suppressant   Yes Historical Provider, MD   aspirin 81 MG chewable tablet Take 81 mg by mouth daily   Yes Historical Provider, MD   NONFORMULARY CBD oil   Yes Historical Provider, MD   diazePAM (VALIUM) 5 MG tablet Take 5 mg by mouth every 6 hours as needed for Anxiety.    Yes Historical Provider, MD   Omega-3 Fatty Acids (FISH OIL PO) Take by mouth   Yes Historical Provider, MD   FOLIC ACID PO Take by mouth   Yes Historical Provider, MD   lisinopril (PRINIVIL;ZESTRIL) 10 MG tablet Take 5 mg by mouth daily   Yes Historical Provider, MD   methotrexate (RHEUMATREX) 2.5 MG chemo tablet Take by mouth once a week 5 tablets   Yes Historical Provider, MD   predniSONE (DELTASONE) 5 MG tablet Take 5 mg by mouth daily   Yes Historical Provider, MD   Cyanocobalamin (VITAMIN B12 PO) Take by mouth once a week   Yes Historical Provider, MD   VITAMIN D PO Take 2,000 % by mouth every morning   Yes Historical Provider, MD   hydroxychloroquine (PLAQUENIL) 200 MG tablet Take 200 mg by mouth 2 times daily   Yes Historical Provider, MD   Cinnamon 500 MG CAPS Take 600 mg by mouth daily   Yes Historical Provider, MD   b complex vitamins capsule Take 1 capsule by mouth daily   Yes Historical Provider, MD   TURMERIC PO Take 720 mg by mouth 2 times daily   Yes Historical Provider, MD   vitamin C (ASCORBIC ACID) 500 MG tablet Take 500 mg by mouth daily   Yes Historical Provider, MD   Cholecalciferol (VITAMIN D) 2000 units CAPS capsule Take 1 capsule by mouth daily   Yes Historical Provider, MD   vitamin E 400 UNIT capsule Take 400 Units by mouth daily   Yes Historical Provider, MD   calcium carbonate (OSCAL) 500 MG TABS tablet Take 500 mg by mouth 2 times daily   Yes Historical Provider, MD   Multiple Vitamins-Minerals (THERAPEUTIC MULTIVITAMIN-MINERALS) tablet Take 1 tablet by mouth daily   Yes Historical Provider, MD   Glucosamine-Chondroit-Vit C-Mn (GLUCOSAMINE 1500 COMPLEX) CAPS Take 1 capsule by mouth daily   Yes Historical Provider, MD   Coenzyme Q10 (COQ10) 100 MG CAPS Take 1 capsule by mouth daily   Yes Historical Provider, MD   Resveratrol 100 MG CAPS Take 1 capsule by mouth daily   Yes Historical Provider, MD   Niacinamide 500 MG TBCR Take 1,500 mg by mouth 2 times daily   Yes Historical Provider, MD   ondansetron (ZOFRAN ODT) 4 MG disintegrating tablet Take 1 tablet by mouth every 8 hours as needed for Nausea 2/7/19  Yes Augustus Araujo MD   meclizine (ANTIVERT) 25 MG tablet Take 1 tablet by mouth 3 times daily as needed for Dizziness 2/7/19  Yes Augustus Araujo MD   hydrocortisone 2.5 % cream Apply topically 2 times daily Apply topically 2 times daily.    Yes Historical Provider, MD   Melatonin 5 MG CAPS Take by mouth   Yes Historical Provider, MD   cetirizine (ZYRTEC) 10 MG tablet Take 10 mg by mouth daily   Yes Historical Provider, MD   levothyroxine (SYNTHROID) 88 MCG tablet Take 88 mcg by mouth Daily   Yes Historical Provider, MD   omeprazole (PRILOSEC) 20 MG delayed release capsule Take 40 mg by mouth 2 times daily    Yes Historical Provider, MD   polyethylene glycol (GLYCOLAX) packet Take 17 g by mouth daily as needed for Constipation   Yes Historical Provider, MD   traMADol (ULTRAM) 50 MG tablet Take 50 mg by mouth every 8 hours as needed for Pain. Yes Historical Provider, MD   diphenhydrAMINE (BENADRYL) 25 MG capsule Take 25 mg by mouth nightly    Yes Historical Provider, MD   allopurinol (ZYLOPRIM) 300 MG tablet Take 300 mg by mouth daily   Yes Historical Provider, MD   furosemide (LASIX) 20 MG tablet Take 20 mg by mouth daily     Historical Provider, MD   oxybutynin (DITROPAN XL) 15 MG extended release tablet Take 15 mg by mouth daily    Historical Provider, MD       REVIEW OF SYSTEMS  (2-9 systems for level 4, 10 ormore for level 5)      Review of Systems   Constitutional: Negative. HENT: Negative. Eyes: Negative. Respiratory: Negative. Cardiovascular: Negative. Gastrointestinal: Negative. Endocrine: Negative. Genitourinary: Negative. Musculoskeletal: Positive for arthralgias. Negative for back pain, neck pain and neck stiffness. Skin: Negative. Neurological: Negative. All other systems negative except as marked. PHYSICAL EXAM  (up to 7 for level 4, 8 or more for level 5)      INITIAL VITALS:  height is 5' 3\" (1.6 m) and weight is 94.8 kg (209 lb). Her oral temperature is 98.6 °F (37 °C). Her blood pressure is 118/96 (abnormal) and her pulse is 97. Her respiration is 20 and oxygen saturation is 98%. Vital signs reviewed. Physical Exam  Constitutional:       Appearance: She is not toxic-appearing. HENT:      Head: Normocephalic and atraumatic. Nose: Nose normal. No rhinorrhea. Mouth/Throat:      Mouth: Mucous membranes are moist.      Pharynx: Oropharynx is clear. Eyes:      Extraocular Movements: Extraocular movements intact. Conjunctiva/sclera: Conjunctivae normal.      Pupils: Pupils are equal, round, and reactive to light. Cardiovascular:      Rate and Rhythm: Normal rate and regular rhythm. Pulses: Normal pulses.       Heart sounds: Normal heart sounds. Pulmonary:      Effort: Pulmonary effort is normal.      Breath sounds: Normal breath sounds. Abdominal:      General: Abdomen is flat. Palpations: Abdomen is soft. Musculoskeletal:      Right shoulder: Tenderness present. No deformity. Decreased range of motion. Left shoulder: Tenderness present. No deformity. Decreased range of motion. Right wrist: Tenderness present. No swelling, deformity or snuff box tenderness. Normal pulse. Left wrist: Normal.      Right hand: Normal.      Left hand: Normal.      Cervical back: Normal range of motion and neck supple. No rigidity or tenderness. Comments: Patients is neurovascularly intact. Able to give OK sign, thumbs, up, and spread fingers against resistance bilaterally. Sensation intact to palmar aspect of index finger, webbing between fingers, and pinky finger bilaterally. Bilateral radial pulses 2   Skin:     General: Skin is warm and dry. Capillary Refill: Capillary refill takes less than 2 seconds. Neurological:      General: No focal deficit present. Mental Status: She is alert. Mental status is at baseline. Psychiatric:         Mood and Affect: Mood normal.         Behavior: Behavior normal.           DIFFERENTIAL DIAGNOSIS / MDM     After my physical exam and reviewing patient's history, I plan to obtain a cardiopulmonary work-up due to her EKG changes. Will obtain CBC, BMP, D-dimer, troponin. I will also obtain a chest x-ray along with a x-ray of right wrist. I do not feel the need to x-ray the patient's shoulders as she denies new injury and had x-rays done last week that showed bilateral shoulder osteoarthritis. Troponin was positive at 20. BMP grossly unremarkable except for some mild hyponatremia at 132. CBC shows an elevated white count, hemoglobin and hematocrit that are decreased from baseline. Age-adjusted D-dimer was elevated at 0.86.  I will provide with Norco for pain control for patient's right wrist pain. X-ray shows a masslike right basilar opacity could represent infectious process in the appropriate clinical setting, CT chest recommended for further evaluation. Attending to complete all remaining care, diagnosis and disposition for this patient. We discussed this patient prior to my departure. My note will be refreshed to reflect these details, though I was not actively involved in this patient's care following the time of 2200. PLAN (LABS / IMAGING / EKG):  Orders Placed This Encounter   Procedures    XR CHEST PORTABLE    XR WRIST RIGHT (MIN 3 VIEWS)    CT CHEST PULMONARY EMBOLISM W CONTRAST    CBC Auto Differential    Basic Metabolic Panel w/ Reflex to MG    Troponin    D-Dimer, Quantitative    Urinalysis Reflex to Culture    Brain Natriuretic Peptide    EKG 12 Lead    EKG 12 Lead       MEDICATIONS ORDERED:  Orders Placed This Encounter   Medications    HYDROcodone-acetaminophen (NORCO) 5-325 MG per tablet 1 tablet    0.9 % sodium chloride bolus    0.9 % sodium chloride bolus    iopamidol (ISOVUE-370) 76 % injection 75 mL    sodium chloride flush 0.9 % injection 10 mL       Controlled Substances Monitoring:     DIAGNOSTIC RESULTS     EKG: All EKG's are interpreted by the Emergency Department Physician who either signs or Co-signs this chart in the absenceof a cardiologist.        RADIOLOGY: All images are read by the radiologist and their interpretations are reviewed. XR WRIST RIGHT (MIN 3 VIEWS)   Final Result   1. Severe proximal predominant degenerative changes at the carpus/wrist which   can be seen with inflammatory arthropathy or gouty arthropathy. SLAC wrist   deformity with widening of the scapholunate space and proximal migration of   the capitate. 2. Mild osteoarthrosis at the 1st [facility] joint. 3. Moderate soft tissue swelling and edema about the wrist.   4. No acute fracture evident. 5. Severe impingement of the distal radioulnar joint. XR CHEST PORTABLE   Final Result   Masslike right basilar opacity could represent infectious process in the   appropriate clinical setting. However, a chest CT should be considered for   further evaluation         CT CHEST PULMONARY EMBOLISM W CONTRAST    (Results Pending)       XR CHEST PORTABLE    Result Date: 10/19/2021  EXAMINATION: ONE XRAY VIEW OF THE CHEST 10/19/2021 7:58 pm COMPARISON: 07/10/2019 HISTORY: ORDERING SYSTEM PROVIDED HISTORY: irregular hr TECHNOLOGIST PROVIDED HISTORY: irregular hr Reason for Exam: irregular heart rate Acuity: Acute Type of Exam: Initial FINDINGS: Cardiomediastinal silhouette stable. Right basilar masslike opacity. No pneumothorax. No pleural effusion. Masslike right basilar opacity could represent infectious process in the appropriate clinical setting.   However, a chest CT should be considered for further evaluation       LABS:  Results for orders placed or performed during the hospital encounter of 10/19/21   CBC Auto Differential   Result Value Ref Range    WBC 15.5 (H) 3.5 - 11.0 k/uL    RBC 3.05 (L) 4.0 - 5.2 m/uL    Hemoglobin 10.8 (L) 12.0 - 16.0 g/dL    Hematocrit 31.9 (L) 36 - 46 %    .7 (H) 80 - 100 fL    MCH 35.4 (H) 26 - 34 pg    MCHC 33.8 31 - 37 g/dL    RDW 15.9 (H) 12.5 - 15.4 %    Platelets 686 329 - 811 k/uL    MPV 7.7 6.0 - 12.0 fL    NRBC Automated NOT REPORTED per 100 WBC    Differential Type NOT REPORTED     Seg Neutrophils 84 (H) 36 - 66 %    Lymphocytes 7 (L) 24 - 44 %    Monocytes 8 2 - 11 %    Eosinophils % 0 (L) 1 - 4 %    Basophils 1 0 - 2 %    Immature Granulocytes NOT REPORTED 0 %    Segs Absolute 13.10 (H) 1.8 - 7.7 k/uL    Absolute Lymph # 1.00 1.0 - 4.8 k/uL    Absolute Mono # 1.30 (H) 0.1 - 1.2 k/uL    Absolute Eos # 0.00 0.0 - 0.4 k/uL    Basophils Absolute 0.10 0.0 - 0.2 k/uL    Absolute Immature Granulocyte NOT REPORTED 0.00 - 0.30 k/uL    WBC Morphology NOT REPORTED     RBC Morphology NOT REPORTED     Platelet Estimate NOT REPORTED    Basic Metabolic Panel w/ Reflex to MG   Result Value Ref Range    Glucose 122 (H) 70 - 99 mg/dL    BUN 16 8 - 23 mg/dL    CREATININE 0.65 0.50 - 0.90 mg/dL    Bun/Cre Ratio NOT REPORTED 9 - 20    Calcium 10.1 8.6 - 10.4 mg/dL    Sodium 132 (L) 135 - 144 mmol/L    Potassium 4.0 3.7 - 5.3 mmol/L    Chloride 91 (L) 98 - 107 mmol/L    CO2 25 20 - 31 mmol/L    Anion Gap 16 9 - 17 mmol/L    GFR Non-African American >60 >60 mL/min    GFR African American >60 >60 mL/min    GFR Comment          GFR Staging NOT REPORTED    Troponin   Result Value Ref Range    Troponin, High Sensitivity 20 (H) 0 - 14 ng/L    Troponin T NOT REPORTED <0.03 ng/mL    Troponin Interp NOT REPORTED    D-Dimer, Quantitative   Result Value Ref Range    D-Dimer, Quant 0.86 mg/L [facility]       EMERGENCY DEPARTMENT COURSE     ED Course as of Oct 19 2307   Tue Oct 19, 2021   2200 Attending to complete all remaining care, diagnosis and disposition for this patient. We discussed this patient prior to my departure. My note will be refreshed to reflect these details, though I was not actively involved in this patient's care following the time stamp. [TM]      ED Course User Index  [TM] COBY Pena CNP        Vitals:    Vitals:    10/19/21 1919   BP: (!) 118/96   Pulse: 97   Resp: 20   Temp: 98.6 °F (37 °C)   TempSrc: Oral   SpO2: 98%   Weight: 94.8 kg (209 lb)   Height: 5' 3\" (1.6 m)     -------------------------  BP: (!) 118/96, Temp: 98.6 °F (37 °C), Pulse: 97, Resp: 20      RE-EVALUATION:  See ED Course notes above. CONSULTS:  None    PROCEDURES:  None    FINAL IMPRESSION      No diagnosis found. DISPOSITION / PLAN     CONDITION ON DISPOSITION:       PATIENT REFERRED TO:  No follow-up provider specified.     DISCHARGE MEDICATIONS:  New Prescriptions    No medications on file       COBY Alva CNP   Emergency Medicine Nurse Practitioner    (Please note that portions of this note were completed with a voice recognition program.  Efforts were made to edit the dictations but occasionally words aremis-transcribed.)       COBY Cannon - CNP  10/19/21 [id number]

## 2021-10-20 NOTE — PROGRESS NOTES
PHARMACY NOTE:    Parvin Flor was ordered glucosamine, CoQ10, Resveratrol, Niacinamide, and cinnamon. As per the [facility], herbals and certain dietary supplements will be discontinued.   The herbal or dietary supplement may be continued after discharge from the hospital.    Ary Malagon  PharmD  [phone]

## 2021-10-20 NOTE — FLOWSHEET NOTE
Situation:  Writer provided an initial visit with Patient. Assessment:  Ms. Bryce Henley was lying in bed. She smiled and greeted writer. She asked if writer could bring her [employer]. 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 She processed her feelings and thoughts about her possible diagnosis. She expressed hopes that there will be a treatment. She voiced her prayer requests and received Holy Communion. She prayed the traditional Gnosticist prayers with writer. Intervention:  Writer provided supportive presence and explored Pt's coping and needs; inquired about Pt's sources of support and strength; offered empathy and words of support; prayed with Patient and gave her Holy Communion. Outcome:  Patient talked about her sources of support and strength; processed her thoughts and feelings about her situation; received prayer and Holy Communion; prayed with writer; thanked Ting Hassan. Plan:  Writer will attempt to visit Patient when she returns on Friday if Patient is still there. 10/20/21 1700   Encounter Summary   Services provided to: Patient   Referral/Consult From: [address] [address] Hindu/delbert community;Friends/neighbors   [facility] [place]   Continue Visiting   (10/20/21)   Complexity of Encounter Moderate   Length of Encounter 15 minutes   Spiritual Assessment Completed Yes   Routine   Type Initial   Spiritual/Adventism   Type Spiritual support   Assessment Calm; Approachable;Coping   Intervention Active listening;Explored feelings, thoughts, concerns;Explored coping resources;Sustaining presence/ Ministry of presence;Communion;Prayer;Discussed belief system/Taoism practices/delbert;Discussed illness/injury and it's impact   Outcome Receptive; Hopeful;Encouraged;Coping;Expressed feelings/needs/concerns;Engaged in conversation;Expressed gratitude     Electronically signed by Leena Shields, Oncology [facility] [address], [address] [facility] Radiation Oncology  10/20/2021  5:01 PM

## 2021-10-20 NOTE — H&P
[facility]  Office: [address], DO, Gwen Nyhan, DO, Taty Gold, DO, Richard Delbert Arriaga, DO, Maile Ocasio MD, Maci Pro MD, Emiliano Sánchez MD, Yanick Vargas MD, Bhargav Huddleston MD, Atif Botello MD, Ana Martinez MD, Abel Grant MD, Chauncey Venegas, DO, Arina Escobar, DO, Alexis Ruvalcaba MD,  Bree Al DO, Yamil Hicks MD, Roger Ordoñez MD, Archana León MD, Danielle Ernandez MD, Jaquan Rushing MD, Nevin Ring MD, Jeramie Brito, [facility], [facility], CNP, Gillian Juan, CNP, Deandra Mullen, CNS, Keith Rucker, CNP, Fina Wylie, CNP, Orlando Hawkins, CNP, Maddi Herzog, CNP, Hector Acevedo, CNP, Kamilah John, CNP, Carla Shaw PA-C, Ermias Pope, [facility], Miguelangel Gutierrez, CNP, Driss Guajardo, CNP, Blayne Rabago, CNP, Sabrina Balbuena, CNP, Teena Kilgore, CNP, Chantell Che, CNP, Annette Rowley, CNP         [facility]   [address] [facility]    HISTORY AND PHYSICAL EXAMINATION            Date:   10/20/2021  Patient name:  Mago Lira  Date of admission:  10/19/2021  7:15 PM  MRN:   [id number]  Account:  [de-identified]  YOB: 1940  PCP:    Sarah Gan  Room:   [address] [place] [address]  Code Status:    Full Code    Chief Complaint:     Chief Complaint   Patient presents with    Pain     pt c/o arthritic pain in bilateral shoulders and bilateral wrists. Patient unable to get up the whole night and day    Dizziness       History Obtained From:     patient    History of Present Illness:     Mago Lira is a [de-identified] y.o. Non- / non  female who presents with Pain (pt c/o arthritic pain in bilateral shoulders and bilateral wrists. Patient unable to get up the whole night and day) and Dizziness   and is admitted to the hospital for the management of Acute cystitis without hematuria. The patient presented to the er related to weakness she believes was related to her chronic arthritis.   Patient informed staff that she had to stop her normal arthritis medications for a few days last week for a needle biopsy last week at [place] [address]. The patient states that she was seen by her pulmonologist, Dr. Chelsie Tao, at [place] [address] last week for a lung biopsy. She states that they could not get clear biopsies during bronchoscopy and she was sent for a needle biopsy by IR. The patient is still awaiting the results of her path report. The patient states that while she was admitted she did have x-rays of her shoulders because of chronic pain which showed osteoarthritis. She also reports chronic dizziness. The patient reports that she has almost daily dizziness as if the room is spinning. She states that she sees a neuro otolaryngologist for this and has been talking to him about placing a cochlear implant to help control her dizziness. During my assessment she also complains of shortness of breath with activity that has been going on for few weeks. She states she does not notice the shortness of breath much at rest but with stairs or much activity she does become winded. According to the ER note EMS was concerned the patient may have had A. fib in route. 2 EKGs done in our ER show a sinus rhythm with ectopy but no A. fib. I then completed an EKG on the floor because her rhythm did seem irregular per telemetry and on auscultation but again it just shows a sinus arrhythmia. Patient denies any history of heart arrhythmias or palpitations. During my assessment today she does complain of pain to her shoulders bilaterally and to her wrist bilaterally right greater than left with some improvement in her pain since admission. Troponins resulted at 20, 26 and then 26. States she may had a little bit of left chest pain but it felt more like it was shoulder related. She denies nausea, vomiting, or diaphoresis. She does report some shortness of breath but she has had that for a few weeks.   WBCs are 15.5 UA shows positive nitrates, large leukocyte esterase, too many to count WBCs many bacteria. Patient's been placed on Rocephin IV pending culture results. She denies urinary symptoms    Past Medical History:     Past Medical History:   Diagnosis Date    Arthritis     Asthma     Back pain     Caffeine use     2-3 cups coffee and 2-3 cups tea and 1-2 cans pop daily    Carpal tunnel syndrome     Constipation     Dizziness     Gout     Hearing loss     History of blood transfusion     Hypertension     Obesity     Osteoarthritis     PONV (postoperative nausea and vomiting)     Sleep apnea     Thyroid disease     HYPOTHYROID    Urinary incontinence     Urine incontinence     Wears hearing aid     BILATERAL        Past Surgical History:     Past Surgical History:   Procedure Laterality Date    CARPAL TUNNEL RELEASE Bilateral     CATARACT REMOVAL Bilateral     CATARACT REMOVAL WITH IMPLANT Bilateral     COLONOSCOPY      CYSTOSCOPY      and dilation    FOOT SURGERY Right     HYSTERECTOMY      JOINT REPLACEMENT Left 2011    TKA    KNEE ARTHROSCOPY Left     KNEE SURGERY Left     [place] TOTAL KNEE ARTHROPLASTY Right 7/17/2018    KNEE TOTAL ARTHROPLASTY performed by Diony De Paz MD at [facility]          Medications Prior to Admission:     Prior to Admission medications    Medication Sig Start Date End Date Taking? Authorizing Provider   metFORMIN (GLUCOPHAGE) 500 MG tablet Take 500 mg by mouth 2 times daily (with meals) Pt takes for appetite suppressant   Yes Historical Provider, MD   aspirin 81 MG chewable tablet Take 81 mg by mouth daily   Yes Historical Provider, MD   NONFORMULARY CBD oil   Yes Historical Provider, MD   diazePAM (VALIUM) 5 MG tablet Take 5 mg by mouth every 6 hours as needed for Anxiety.    Yes Historical Provider, MD   Omega-3 Fatty Acids (FISH OIL PO) Take by mouth   Yes Historical Provider, MD   FOLIC ACID PO Take by mouth   Yes Historical Provider, MD   lisinopril (PRINIVIL;ZESTRIL) 10 MG tablet Take 5 mg by mouth daily   Yes Historical Provider, MD   methotrexate (RHEUMATREX) 2.5 MG chemo tablet Take by mouth once a week 5 tablets   Yes Historical Provider, MD   predniSONE (DELTASONE) 5 MG tablet Take 5 mg by mouth daily   Yes Historical Provider, MD   Cyanocobalamin (VITAMIN B12 PO) Take by mouth once a week   Yes Historical Provider, MD   VITAMIN D PO Take 2,000 % by mouth every morning   Yes Historical Provider, MD   hydroxychloroquine (PLAQUENIL) 200 MG tablet Take 200 mg by mouth 2 times daily   Yes Historical Provider, MD   Cinnamon 500 MG CAPS Take 600 mg by mouth daily   Yes Historical Provider, MD   b complex vitamins capsule Take 1 capsule by mouth daily   Yes Historical Provider, MD   TURMERIC PO Take 720 mg by mouth 2 times daily   Yes Historical Provider, MD   vitamin C (ASCORBIC ACID) 500 MG tablet Take 500 mg by mouth daily   Yes Historical Provider, MD   Cholecalciferol (VITAMIN D) 2000 units CAPS capsule Take 1 capsule by mouth daily   Yes Historical Provider, MD   vitamin E 400 UNIT capsule Take 400 Units by mouth daily   Yes Historical Provider, MD   calcium carbonate (OSCAL) 500 MG TABS tablet Take 500 mg by mouth 2 times daily   Yes Historical Provider, MD   Multiple Vitamins-Minerals (THERAPEUTIC MULTIVITAMIN-MINERALS) tablet Take 1 tablet by mouth daily   Yes Historical Provider, MD   Glucosamine-Chondroit-Vit C-Mn (GLUCOSAMINE 1500 COMPLEX) CAPS Take 1 capsule by mouth daily   Yes Historical Provider, MD   Coenzyme Q10 (COQ10) 100 MG CAPS Take 1 capsule by mouth daily   Yes Historical Provider, MD   Resveratrol 100 MG CAPS Take 1 capsule by mouth daily   Yes Historical Provider, MD   Niacinamide 500 MG TBCR Take 1,500 mg by mouth 2 times daily   Yes Historical Provider, MD   ondansetron (ZOFRAN ODT) 4 MG disintegrating tablet Take 1 tablet by mouth every 8 hours as needed for Nausea 2/7/19  Yes Yoav Fields MD   meclizine (ANTIVERT) 25 MG tablet Take 1 tablet by mouth 3 times daily as needed for Dizziness 2/7/19  Yes Sania Fair MD   hydrocortisone 2.5 % cream Apply topically 2 times daily Apply topically 2 times daily. Yes Historical Provider, MD   Melatonin 5 MG CAPS Take by mouth   Yes Historical Provider, MD   cetirizine (ZYRTEC) 10 MG tablet Take 10 mg by mouth daily   Yes Historical Provider, MD   levothyroxine (SYNTHROID) 88 MCG tablet Take 88 mcg by mouth Daily   Yes Historical Provider, MD   omeprazole (PRILOSEC) 20 MG delayed release capsule Take 40 mg by mouth 2 times daily    Yes Historical Provider, MD   polyethylene glycol (GLYCOLAX) packet Take 17 g by mouth daily as needed for Constipation   Yes Historical Provider, MD   traMADol (ULTRAM) 50 MG tablet Take 50 mg by mouth every 8 hours as needed for Pain. Yes Historical Provider, MD   diphenhydrAMINE (BENADRYL) 25 MG capsule Take 25 mg by mouth nightly    Yes Historical Provider, MD   allopurinol (ZYLOPRIM) 300 MG tablet Take 300 mg by mouth daily   Yes Historical Provider, MD   furosemide (LASIX) 20 MG tablet Take 20 mg by mouth daily     Historical Provider, MD   oxybutynin (DITROPAN XL) 15 MG extended release tablet Take 15 mg by mouth daily    Historical Provider, MD        Allergies:     Povidone-iodine, Hydrochlorothiazide, Meperidine, and Naproxen    Social History:     Tobacco:    reports that she quit smoking about 46 years ago. She has a 4.25 pack-year smoking history. She has never used smokeless tobacco.  Alcohol:      reports current alcohol use. Drug Use:  reports no history of drug use. Family History:     Family History   Problem Relation Age of Onset    Heart Failure Mother     Heart Disease Father        Review of Systems:     Positive and Negative as described in HPI. Review of Systems   Constitutional: Negative for chills, diaphoresis and fever. HENT: Negative for congestion and hearing loss. Respiratory: Positive for shortness of breath.  Negative for cough, wheezing and stridor. Cardiovascular: Positive for chest pain. Negative for palpitations and leg swelling. Gastrointestinal: Negative for abdominal pain, blood in stool, constipation, diarrhea, nausea and vomiting. Genitourinary: Negative for dysuria and frequency. Musculoskeletal: Negative for myalgias. Skin: Negative for rash. Neurological: Positive for dizziness and weakness. Negative for seizures and headaches. Psychiatric/Behavioral: The patient is not nervous/anxious. Physical Exam:   BP (!) 96/53   Pulse 79   Temp 98.1 °F (36.7 °C) (Oral)   Resp 16   Ht 5' 3\" (1.6 m)   Wt 210 lb 5.1 oz (95.4 kg)   SpO2 97%   BMI 37.26 kg/m²   Temp (24hrs), Av.8 °F (37.1 °C), Min:98.1 °F (36.7 °C), Max:99.8 °F (37.7 °C)    No results for input(s): POCGLU in the last 72 hours. Intake/Output Summary (Last 24 hours) at 10/20/2021 1635  Last data filed at 10/20/2021 1556  Gross per 24 hour   Intake 1446.25 ml   Output 900 ml   Net 546.25 ml       Physical Exam  Vitals and nursing note reviewed. Constitutional:       Appearance: Normal appearance. HENT:      Mouth/Throat:      Mouth: Mucous membranes are dry. Eyes:      Extraocular Movements: Extraocular movements intact. Conjunctiva/sclera: Conjunctivae normal.   Cardiovascular:      Rate and Rhythm: Normal rate. Rhythm irregular. Pulses: Normal pulses. Pulmonary:      Effort: Pulmonary effort is normal.   Abdominal:      General: Bowel sounds are normal.      Palpations: Abdomen is soft. Musculoskeletal:         General: Tenderness present. Right shoulder: Decreased range of motion. Left shoulder: Decreased range of motion. Right wrist: Swelling and tenderness present. Decreased range of motion. Left wrist: Tenderness present. Decreased range of motion. Skin:     General: Skin is warm. Capillary Refill: Capillary refill takes less than 2 seconds. Neurological:      General: No focal deficit present. Mental Status: She is alert and oriented to person, place, and time. GCS: GCS eye subscore is 4. GCS verbal subscore is 5. GCS motor subscore is 6. Sensory: Sensation is intact.          Investigations:      Laboratory Testing:  Recent Results (from the past 24 hour(s))   EKG 12 Lead    Collection Time: 10/19/21  7:39 PM   Result Value Ref Range    Ventricular Rate 92 BPM    Atrial Rate 92 BPM    P-R Interval 130 ms    QRS Duration 90 ms    Q-T Interval 356 ms    QTc Calculation (Bazett) 440 ms    P Axis 78 degrees    R Axis -32 degrees    T Axis 34 degrees   CBC Auto Differential    Collection Time: 10/19/21  8:17 PM   Result Value Ref Range    WBC 15.5 (H) 3.5 - 11.0 k/uL    RBC 3.05 (L) 4.0 - 5.2 m/uL    Hemoglobin 10.8 (L) 12.0 - 16.0 g/dL    Hematocrit 31.9 (L) 36 - 46 %    .7 (H) 80 - 100 fL    MCH 35.4 (H) 26 - 34 pg    MCHC 33.8 31 - 37 g/dL    RDW 15.9 (H) 12.5 - 15.4 %    Platelets 685 523 - 675 k/uL    MPV 7.7 6.0 - 12.0 fL    NRBC Automated NOT REPORTED per 100 WBC    Differential Type NOT REPORTED     Seg Neutrophils 84 (H) 36 - 66 %    Lymphocytes 7 (L) 24 - 44 %    Monocytes 8 2 - 11 %    Eosinophils % 0 (L) 1 - 4 %    Basophils 1 0 - 2 %    Immature Granulocytes NOT REPORTED 0 %    Segs Absolute 13.10 (H) 1.8 - 7.7 k/uL    Absolute Lymph # 1.00 1.0 - 4.8 k/uL    Absolute Mono # 1.30 (H) 0.1 - 1.2 k/uL    Absolute Eos # 0.00 0.0 - 0.4 k/uL    Basophils Absolute 0.10 0.0 - 0.2 k/uL    Absolute Immature Granulocyte NOT REPORTED 0.00 - 0.30 k/uL    WBC Morphology NOT REPORTED     RBC Morphology NOT REPORTED     Platelet Estimate NOT REPORTED    Basic Metabolic Panel w/ Reflex to MG    Collection Time: 10/19/21  8:17 PM   Result Value Ref Range    Glucose 122 (H) 70 - 99 mg/dL    BUN 16 8 - 23 mg/dL    CREATININE 0.65 0.50 - 0.90 mg/dL    Bun/Cre Ratio NOT REPORTED 9 - 20    Calcium 10.1 8.6 - 10.4 mg/dL    Sodium 132 (L) 135 - 144 mmol/L    Potassium 4.0 3.7 - 5.3 mmol/L    Chloride 91 (L) 98 - 107 mmol/L    CO2 25 20 - 31 mmol/L    Anion Gap 16 9 - 17 mmol/L    GFR Non-African American >60 >60 mL/min    GFR African American >60 >60 mL/min    GFR Comment          GFR Staging NOT REPORTED    Troponin    Collection Time: 10/19/21  8:17 PM   Result Value Ref Range    Troponin, High Sensitivity 20 (H) 0 - 14 ng/L    Troponin T NOT REPORTED <0.03 ng/mL    Troponin Interp NOT REPORTED    D-Dimer, Quantitative    Collection Time: 10/19/21  8:17 PM   Result Value Ref Range    D-Dimer, Quant 0.86 mg/L FEU   Brain Natriuretic Peptide    Collection Time: 10/19/21  8:17 PM   Result Value Ref Range    Pro-BNP 84 <300 pg/mL    BNP Interpretation NOT REPORTED    Sedimentation Rate    Collection Time: 10/19/21  8:17 PM   Result Value Ref Range    Sed Rate 55 (H) 0 - 30 mm   Urinalysis Reflex to Culture    Collection Time: 10/19/21  9:30 PM    Specimen: Urine, clean catch   Result Value Ref Range    Color, UA Straw (A) Yellow    Turbidity UA Cloudy (A) Clear    Glucose, Ur NEGATIVE NEGATIVE    Bilirubin Urine NEGATIVE NEGATIVE    Ketones, Urine SMALL (A) NEGATIVE    Specific Gravity, UA 1.025 1.005 - 1.030    Urine Hgb SMALL (A) NEGATIVE    pH, UA 6.0 5.0 - 8.0    Protein, UA 1+ (A) NEGATIVE    Urobilinogen, Urine Normal Normal    Nitrite, Urine POSITIVE (A) NEGATIVE    Leukocyte Esterase, Urine LARGE (A) NEGATIVE    Urinalysis Comments NOT REPORTED    Microscopic Urinalysis    Collection Time: 10/19/21  9:30 PM   Result Value Ref Range    -          WBC, UA TOO NUMEROUS TO COUNT 0 - 5 /HPF    RBC, UA 5 TO 10 0 - 2 /HPF    Casts UA NOT REPORTED /LPF    Crystals, UA NOT REPORTED None /HPF    Epithelial Cells UA 0 TO 2 0 - 5 /HPF    Renal Epithelial, UA NOT REPORTED 0 /HPF    Bacteria, UA MANY (A) None    Mucus, UA NOT REPORTED None    Trichomonas, UA NOT REPORTED None    Amorphous, UA NOT REPORTED None    Other Observations UA Culture ordered based on defined criteria. (A) NOT REQ.     Yeast, UA NOT REPORTED None Troponin    Collection Time: 10/20/21  2:43 AM   Result Value Ref Range    Troponin, High Sensitivity 24 (H) 0 - 14 ng/L    Troponin T NOT REPORTED <0.03 ng/mL    Troponin Interp NOT REPORTED    COVID-19, Rapid    Collection Time: 10/20/21  4:00 AM    Specimen: Nasopharyngeal Swab   Result Value Ref Range    Specimen Description . NASOPHARYNGEAL SWAB     SARS-CoV-2, Rapid Not Detected Not Detected   Troponin    Collection Time: 10/20/21  6:09 AM   Result Value Ref Range    Troponin, High Sensitivity 26 (H) 0 - 14 ng/L    Troponin T NOT REPORTED <0.03 ng/mL    Troponin Interp NOT REPORTED    Troponin    Collection Time: 10/20/21 11:15 AM   Result Value Ref Range    Troponin, High Sensitivity 26 (H) 0 - 14 ng/L    Troponin T NOT REPORTED <0.03 ng/mL    Troponin Interp NOT REPORTED    C-Reactive Protein    Collection Time: 10/20/21 11:15 AM   Result Value Ref Range    .8 (H) 0.0 - 5.0 mg/L   EKG 12 Lead    Collection Time: 10/20/21 11:40 AM   Result Value Ref Range    Ventricular Rate 77 BPM    Atrial Rate 77 BPM    P-R Interval 152 ms    QRS Duration 98 ms    Q-T Interval 382 ms    QTc Calculation (Bazett) 432 ms    P Axis 36 degrees    R Axis -33 degrees    T Axis 28 degrees   EKG 12 Lead    Collection Time: 10/20/21 12:11 PM   Result Value Ref Range    Ventricular Rate 83 BPM    Atrial Rate 83 BPM    P-R Interval 146 ms    QRS Duration 94 ms    Q-T Interval 378 ms    QTc Calculation (Bazett) 444 ms    P Axis 80 degrees    R Axis -32 degrees    T Axis 33 degrees       Imaging/Diagnostics:    XR WRIST RIGHT (MIN 3 VIEWS)    Result Date: 10/19/2021  1. Severe proximal predominant degenerative changes at the carpus/wrist which can be seen with inflammatory arthropathy or gouty arthropathy. SLAC wrist deformity with widening of the scapholunate space and proximal migration of the capitate. 2. Mild osteoarthrosis at the 1st [facility] joint.  3. Moderate soft tissue swelling and edema about the wrist. 4. No acute fracture evident. 5. Severe impingement of the distal radioulnar joint. XR CHEST PORTABLE    Result Date: 10/19/2021  Masslike right basilar opacity could represent infectious process in the appropriate clinical setting. However, a chest CT should be considered for further evaluation     CT CHEST PULMONARY EMBOLISM W CONTRAST    Result Date: 10/20/2021  No evidence of pulmonary embolism. Large malignant neoplasm in the right lower lobe with extension to the ray and metastasis to the bilateral lungs, subcarinal lymph nodes, and possibly the left hilar lymph nodes. Trace left-sided layering pleural effusion. Assessment :      Hospital Problems         Last Modified POA    * (Principal) Acute cystitis without hematuria 10/20/2021 Yes    Benign essential HTN 10/20/2021 Yes    Shortness of breath 10/20/2021 Yes    Gouty arthropathy 10/20/2021 Yes    Hyponatremia 10/20/2021 Yes    Urinary incontinence 10/20/2021 Yes    Weakness 10/20/2021 Yes          Plan:     Patient status inpatient in the Med/Surge    Acute cystitis without hematuria  Rocephin pending culture results  Hydration    Hypertension  Continue home lisinopril and monitor for hypertension. Hold Lasix for now    Hyponatremia  Monitor BMP daily  Hydration    Weakness  PT/OT to evaluate and treat    Continue home Plaquenil, Synthroid, methotrexate, Ditropan, PPI, and multivitamin. Hold Glucophage related to contrast.  Increase prednisone to 10 mg daily for now    Monitor and replace electrolytes as needed    DVT prophylaxis    Norco for pain      Consultations:   [address] [employer]     Patient is admitted as inpatient status because of co-morbidities listed above, severity of signs and symptoms as outlined, requirement for current medical therapies and most importantly because of direct risk to patient if care not provided in a hospital setting. Expected length of stay > 48 hours.     COBY Aguilar CNP  10/20/2021  4:35 PM    Copy sent to Dr. Charissa Madera

## 2021-10-20 NOTE — PROGRESS NOTES
Comprehensive Nutrition Assessment    Type and Reason for Visit:  Initial, Positive Nutrition Screen    Nutrition Recommendations/Plan: 1. Continue current diet. 2. Ensure Enlive TID. 3. Recommend monitor hand/shoulder mobility and assist with cutting food/feeding as needed. Nutrition Assessment:  Pt admited r/t weakness, arthritis pain. Pt was unable to get out of chair last night r/t weakness. Pt with hx of intentional wt loss 23# since June, medically induced and monitored by physician. Pt reports she was started on Metformin to aid in appetite suppresant. Pt does not have DM and pt reports a1c is 5.5. Pt states prior to hospitalization, she has been feeling well & eating balanced meals. Pt was meeting with an RD through physician office. Pt was consuming Orgain protein shake once daily to aid in protein intake. Pt reports hand & arm weakness limiting ability to eat, but has been able to use hands to eat. RD asked pt if she would like assistance with feeding and pt states she has been managing \"okay\" for now. Pt is receptive to Ensure Enlive BID to aid in calorie, protein consumption as observed breakfast tray with <25% consumed. Noted: pt also diagnosed with UTI and lung mass. Pt did note that she had a bronch prior to this admission and is supposed to have an appt with pulmonology tomorrow. Pt having a difficult time with words during assessment, which pt noted she isn't normally \"like this. \"    Malnutrition Assessment:  Malnutrition Status:  Mild malnutrition (medically induced for desired wt loss)    Context:  Social/Environmental Circumstances     Findings of the 6 clinical characteristics of malnutrition:  Energy Intake:  1 - Less than 75% estimated energy requirements for 3 months or longer  Weight Loss:  7 - Greater than 7.5% over 3 months     Body Fat Loss:  1 - Mild body fat loss Orbital, Triceps   Muscle Mass Loss:  1 - Mild muscle mass loss Temples (temporalis)  Fluid Accumulation:  No significant fluid accumulation     Strength:  Not Performed    Estimated Daily Nutrient Needs:  Energy (kcal):  1670 kcal/d; Weight Used for Energy Requirements:  Current     Protein (g):  82-95g/d; Weight Used for Protein Requirements:  Current (0.8-1g/kg)        Fluid (ml/day):  1670mL/d; Method Used for Fluid Requirements:  1 ml/kcal      Wounds:  None       Current Nutrition Therapies:    ADULT DIET; Regular; No Caffeine    Anthropometric Measures:  · Height: 5' 3\" (160 cm)  · Current Body Weight: 210 lb 5.1 oz (95.4 kg)   · Usual Body Weight: 233 lb (105.7 kg)     · Ideal Body Weight: 115 lbs;   · BMI: 37.3  · BMI Categories: Obese Class 2 (BMI 35.0 -39.9)       Nutrition Diagnosis:   · Inadequate protein-energy intake related to early satiety as evidenced by poor intake prior to admission, weight loss, weight loss 7.5% in 3 months      Nutrition Interventions:   Food and/or Nutrient Delivery:  Continue Current Diet, Start Oral Nutrition Supplement  Nutrition Education/Counseling:  No recommendation at this time   Coordination of Nutrition Care:  Continue to monitor while inpatient, Interdisciplinary Rounds    Goals:  PO intakes to meet >75% estimated needs. Nutrition Monitoring and Evaluation:   Food/Nutrient Intake Outcomes:  Food and Nutrient Intake, Supplement Intake, IVF Intake  Physical Signs/Symptoms Outcomes:  Biochemical Data, Nutrition Focused Physical Findings, Weight     Discharge Planning:     Too soon to determine     Electronically signed by Alecia Cortes RD, LD on 10/20/21 at 10:14 AM EDT    [address] [place] [address], SENA العلي, LD  Registered Dietitian  [employer]  [phone]

## 2021-10-20 NOTE — ED PROVIDER NOTES
[address] [facility] ICU  [address] [facility] [address]  Phone: [phone]      Attending Physician Attestation    I performed a history and physical examination of the patient and discussed management with the mid level provider. I reviewed the mid level provider's note and agree with the documented findings and plan of care. Any areas of disagreement are noted on the chart. I was personally present for the key portions of any procedures. I have documented in the chart those procedures where I was not present during the key portions. I have reviewed the emergency nurses triage note. I agree with the chief complaint, past medical history, past surgical history, allergies, medications, social and family history as documented unless otherwise noted below. Documentation of the HPI, Physical Exam and Medical Decision Making performed by mid level providers is based on my personal performance of the HPI, PE and MDM. For Physician Assistant/ Nurse Practitioner cases/documentation I have personally evaluated this patient and have completed at least one if not all key elements of the E/M (history, physical exam, and MDM). Additional findings are as noted. CHIEF COMPLAINT       Chief Complaint   Patient presents with    Pain     pt c/o arthritic pain in bilateral shoulders and bilateral wrists. Patient unable to get up the whole night and day    Dizziness         HISTORY OF PRESENT ILLNESS    Parvin Ray is a [de-identified] y.o. female who presents history of lung mass undergoing work-up for cancer, chronic bilateral shoulder pain, osteoarthritis, right ear deafness, BPPV, chronic dizziness, and sleep apnea who presents for evaluation of bilateral shoulder pain. The patient reports that she has almost daily dizziness as if the room is spinning.   She states that she sees a neuro otolaryngologist for this and has been talking to him about placing a cochlear implant to help control her dizziness. She was last seen a few weeks ago. She states that they are currently holding off on a cochlear implant until she completes work-up of a suspected lung mass with cancer. The patient states that she was seen by her pulmonologist, Dr. Thom Jin, at [place] [address] last week for a bronchoscopy and lung biopsy. She states that they could not get clear biopsies during bronchoscopy and she was sent for a needle biopsy by IR. The patient is still awaiting the results of her path report. The patient states that while she was admitted she did have x-rays of her shoulders because of chronic pain which showed osteoarthritis. The patient states that tonight she was dizzy and sat down on her chair. She had developed gradual onset, constant, progressive, sharp, stabbing, nonradiating, bilateral shoulder pain. The patient states that she was unable to get herself out of the chair secondary to her shoulder pain and subsequently had to call 911. The patient was brought by EMS. She states that she has persistent shoulder pain and has not taken any medications for her pain. She states that her pain is worse with movement and she does not list any palliating factors. Her cardiologist is Dr. Andre Kelly. She states that she has not had a recent cardiac work-up. The patient denies personal or family history of cardiac disease or blood clots. She denies fever, chills, headache, vision changes, neck pain, back pain, chest pain, shortness of breath, abdominal pain, urinary/bowel symptoms, focal weakness, numbness, tingling,  or recent falls. PAST MEDICAL HISTORY    has a past medical history of Arthritis, Asthma, Back pain, Caffeine use, Carpal tunnel syndrome, Constipation, Dizziness, Gout, Hearing loss, History of blood transfusion, Hypertension, Obesity, Osteoarthritis, PONV (postoperative nausea and vomiting), Sleep apnea, Thyroid disease, Urinary incontinence, Urine incontinence, and Wears hearing aid.     SURGICAL HISTORY      has a past surgical history that includes Tonsillectomy; Hysterectomy; knee surgery (Left); Foot surgery (Right); Cystoscopy; Cataract removal (Bilateral); joint replacement (Left, 2011); Knee arthroscopy (Left); Carpal tunnel release (Bilateral); Colonoscopy; Cataract removal with implant (Bilateral); and pr total knee arthroplasty (Right, 7/17/2018). CURRENT MEDICATIONS       Current Discharge Medication List      CONTINUE these medications which have NOT CHANGED    Details   metFORMIN (GLUCOPHAGE) 500 MG tablet Take 500 mg by mouth 2 times daily (with meals) Pt takes for appetite suppressant      aspirin 81 MG chewable tablet Take 81 mg by mouth daily      NONFORMULARY CBD oil      diazePAM (VALIUM) 5 MG tablet Take 5 mg by mouth every 6 hours as needed for Anxiety. Omega-3 Fatty Acids (FISH OIL PO) Take by mouth      FOLIC ACID PO Take by mouth      lisinopril (PRINIVIL;ZESTRIL) 10 MG tablet Take 5 mg by mouth daily      methotrexate (RHEUMATREX) 2.5 MG chemo tablet Take by mouth once a week 5 tablets      predniSONE (DELTASONE) 5 MG tablet Take 5 mg by mouth daily      Cyanocobalamin (VITAMIN B12 PO) Take by mouth once a week      !! VITAMIN D PO Take 2,000 % by mouth every morning      hydroxychloroquine (PLAQUENIL) 200 MG tablet Take 200 mg by mouth 2 times daily      Cinnamon 500 MG CAPS Take 600 mg by mouth daily      b complex vitamins capsule Take 1 capsule by mouth daily      TURMERIC PO Take 720 mg by mouth 2 times daily      vitamin C (ASCORBIC ACID) 500 MG tablet Take 500 mg by mouth daily      !!  Cholecalciferol (VITAMIN D) 2000 units CAPS capsule Take 1 capsule by mouth daily      vitamin E 400 UNIT capsule Take 400 Units by mouth daily      calcium carbonate (OSCAL) 500 MG TABS tablet Take 500 mg by mouth 2 times daily      Multiple Vitamins-Minerals (THERAPEUTIC MULTIVITAMIN-MINERALS) tablet Take 1 tablet by mouth daily      Glucosamine-Chondroit-Vit C-Mn (GLUCOSAMINE 1500 COMPLEX) CAPS Take 1 capsule by mouth daily      Coenzyme Q10 (COQ10) 100 MG CAPS Take 1 capsule by mouth daily      Resveratrol 100 MG CAPS Take 1 capsule by mouth daily      Niacinamide 500 MG TBCR Take 1,500 mg by mouth 2 times daily      ondansetron (ZOFRAN ODT) 4 MG disintegrating tablet Take 1 tablet by mouth every 8 hours as needed for Nausea  Qty: 12 tablet, Refills: 0      meclizine (ANTIVERT) 25 MG tablet Take 1 tablet by mouth 3 times daily as needed for Dizziness  Qty: 15 tablet, Refills: 0      hydrocortisone 2.5 % cream Apply topically 2 times daily Apply topically 2 times daily. Melatonin 5 MG CAPS Take by mouth      cetirizine (ZYRTEC) 10 MG tablet Take 10 mg by mouth daily      levothyroxine (SYNTHROID) 88 MCG tablet Take 88 mcg by mouth Daily      omeprazole (PRILOSEC) 20 MG delayed release capsule Take 40 mg by mouth 2 times daily       polyethylene glycol (GLYCOLAX) packet Take 17 g by mouth daily as needed for Constipation      traMADol (ULTRAM) 50 MG tablet Take 50 mg by mouth every 8 hours as needed for Pain. diphenhydrAMINE (BENADRYL) 25 MG capsule Take 25 mg by mouth nightly       allopurinol (ZYLOPRIM) 300 MG tablet Take 300 mg by mouth daily      furosemide (LASIX) 20 MG tablet Take 20 mg by mouth daily       oxybutynin (DITROPAN XL) 15 MG extended release tablet Take 15 mg by mouth daily       ! ! - Potential duplicate medications found. Please discuss with provider. ALLERGIES     is allergic to povidone-iodine, hydrochlorothiazide, meperidine, and naproxen. FAMILY HISTORY     She indicated that her mother is . She indicated that her father is . family history includes Heart Disease in her father; Heart Failure in her mother. SOCIAL HISTORY      reports that she has quit smoking. She has never used smokeless tobacco. She reports current alcohol use. She reports that she does not use drugs.     PHYSICAL EXAM     INITIAL VITALS:  height is 5' 3\" (1.6 m) and weight is 95.4 kg (210 lb 5.1 oz). Her oral temperature is 98.6 °F (37 °C). Her blood pressure is 121/68 and her pulse is 97. Her respiration is 20 and oxygen saturation is 98%. Heart regular rate and rhythm. Diminished lung sounds on the right base. Abdomen soft and nontender. No midline spinal tenderness to palpation, step-off or deformity. Capillary refill less than 2 seconds. Radial pulses 2+/4 and equal bilaterally. Capillary refill less than 2 seconds. Tenderness to palpation over the bilateral shoulders with is worse with range of motion. No crepitus, erythema, ecchymosis or edema noted to the shoulders. Radial, ulnar and median nerves intact to the bilateral upper extremities. Able to perform intrinsic movements of the hand without difficulty. Normal  strength bilaterally. No snuff box tenderness. Radial pulses 2+/4. Capillary refill less than 2 seconds. DIAGNOSTIC RESULTS     EKG: All EKG's are interpreted by the Emergency Department Physician who either signs or Co-signs this chart in the absence of a cardiologist.    EKG 1939 sinus rhythm, frequent PACs, clear P waves, no ST elevation or depression, left axis deviation, normal intervals, no ST elevation or depression, poor R wave progression, Q wave in aVR, grossly unchanged from EKG on 2/7/2019 with irregular rhythm    EKG 2208 sinus rhythm, rate 92 bpm, left axis deviation, normal intervals, no ST elevation or depression, irregular rhythm but clear P waves, suspected frequent PACs, no T wave inversions, poor R wave progression, Q wave in aVR, more regular rhythm compared to first EKG    RADIOLOGY:     XR WRIST RIGHT (MIN 3 VIEWS)    Result Date: 10/19/2021  EXAMINATION: 3 XRAY VIEWS OF THE RIGHT WRIST 10/19/2021 9:47 pm COMPARISON: None.  HISTORY: ORDERING SYSTEM PROVIDED HISTORY: pain TECHNOLOGIST PROVIDED HISTORY: pain Reason for Exam: generalized right wrist pain and weakness Acuity: Acute Type of Exam: Initial Additional signs and symptoms: NA Relevant Medical/Surgical History: osteoarthritis, gout, and carpel tunnel release 81-year-old female with generalized right wrist pain and weakness FINDINGS: Severe intercarpal joint space narrowing with widening of the scapholunate joint space and proximal migration of the capitate. Extensive subcortical cystic changes at the distal radius. Severe impingement of the distal radioulnar joint. Mild degenerative changes of the 1st CMC, 1st IP, and 1st through 3rd MCP joints. Moderate soft tissue swelling and edema about the wrist.  Subcortical cystic changes at the 1st through 3rd metacarpal heads. No acute fracture evident. 1. Severe proximal predominant degenerative changes at the carpus/wrist which can be seen with inflammatory arthropathy or gouty arthropathy. SLAC wrist deformity with widening of the scapholunate space and proximal migration of the capitate. 2. Mild osteoarthrosis at the 1st Aia 16 joint. 3. Moderate soft tissue swelling and edema about the wrist. 4. No acute fracture evident. 5. Severe impingement of the distal radioulnar joint. XR CHEST PORTABLE    Result Date: 10/19/2021  EXAMINATION: ONE XRAY VIEW OF THE CHEST 10/19/2021 7:58 pm COMPARISON: 07/10/2019 HISTORY: ORDERING SYSTEM PROVIDED HISTORY: irregular hr TECHNOLOGIST PROVIDED HISTORY: irregular hr Reason for Exam: irregular heart rate Acuity: Acute Type of Exam: Initial FINDINGS: Cardiomediastinal silhouette stable. Right basilar masslike opacity. No pneumothorax. No pleural effusion. Masslike right basilar opacity could represent infectious process in the appropriate clinical setting.   However, a chest CT should be considered for further evaluation     CT CHEST PULMONARY EMBOLISM W CONTRAST    Result Date: 10/20/2021  EXAMINATION: CTA OF THE CHEST 10/19/2021 11:00 pm TECHNIQUE: CTA of the chest was performed after the administration of intravenous contrast.  Multiplanar reformatted images are provided for review. MIP images are provided for review. Dose modulation, iterative reconstruction, and/or weight based adjustment of the mA/kV was utilized to reduce the radiation dose to as low as reasonably achievable. COMPARISON: None. HISTORY: ORDERING SYSTEM PROVIDED HISTORY: elevated d-dimer, chest pain TECHNOLOGIST PROVIDED HISTORY: elevated d-dimer, chest pain Decision Support Exception - unselect if not a suspected or confirmed emergency medical condition->Emergency Medical Condition (MA) Reason for Exam: elevated d-dimer Acuity: Acute Type of Exam: Initial FINDINGS: Pulmonary Arteries: Pulmonary arteries are adequately opacified for evaluation. No evidence of intraluminal filling defect to suggest pulmonary embolism. Main pulmonary artery is normal in caliber. Mediastinum: Atherosclerosis of the thoracic aorta and coronary arteries noted. Necrotic subcarinal adenopathy measuring 4.0 x 2.0 cm on image number 51. Prominent to mildly enlarged left hilar lymph node measuring 1.1 x 1.1 cm on image number 59 Lungs/pleura: An approximately 7.0 x 6.0 cm mass in the right lower lobe along the right lung major fissure and extending to the right lung hilum. Multiple subcentimeter nodules throughout the bilateral lungs, compatible with metastases. The representative nodule in the left upper lobe measures 0.8 cm on image number 64 subsegmental densities in the bilateral lower lobes, likely atelectasis. Trace left-sided layering pleural effusion. Upper Abdomen: Calcified granulomas in the liver and spleen. Soft Tissues/Bones: No lytic or sclerotic lesion in the regional skeleton. Multilevel thoracic spondylosis. Osteoarthritis of the bilateral glenohumeral joints, more severe on the right. No evidence of pulmonary embolism. Large malignant neoplasm in the right lower lobe with extension to the ray and metastasis to the bilateral lungs, subcarinal lymph nodes, and possibly the left hilar lymph nodes. Trace left-sided layering pleural effusion. LABS:  Results for orders placed or performed during the hospital encounter of 10/19/21   COVID-19, Rapid    Specimen: Nasopharyngeal Swab   Result Value Ref Range    Specimen Description . NASOPHARYNGEAL SWAB     SARS-CoV-2, Rapid Not Detected Not Detected   CBC Auto Differential   Result Value Ref Range    WBC 15.5 (H) 3.5 - 11.0 k/uL    RBC 3.05 (L) 4.0 - 5.2 m/uL    Hemoglobin 10.8 (L) 12.0 - 16.0 g/dL    Hematocrit 31.9 (L) 36 - 46 %    .7 (H) 80 - 100 fL    MCH 35.4 (H) 26 - 34 pg    MCHC 33.8 31 - 37 g/dL    RDW 15.9 (H) 12.5 - 15.4 %    Platelets 505 821 - 728 k/uL    MPV 7.7 6.0 - 12.0 fL    NRBC Automated NOT REPORTED per 100 WBC    Differential Type NOT REPORTED     Seg Neutrophils 84 (H) 36 - 66 %    Lymphocytes 7 (L) 24 - 44 %    Monocytes 8 2 - 11 %    Eosinophils % 0 (L) 1 - 4 %    Basophils 1 0 - 2 %    Immature Granulocytes NOT REPORTED 0 %    Segs Absolute 13.10 (H) 1.8 - 7.7 k/uL    Absolute Lymph # 1.00 1.0 - 4.8 k/uL    Absolute Mono # 1.30 (H) 0.1 - 1.2 k/uL    Absolute Eos # 0.00 0.0 - 0.4 k/uL    Basophils Absolute 0.10 0.0 - 0.2 k/uL    Absolute Immature Granulocyte NOT REPORTED 0.00 - 0.30 k/uL    WBC Morphology NOT REPORTED     RBC Morphology NOT REPORTED     Platelet Estimate NOT REPORTED    Basic Metabolic Panel w/ Reflex to MG   Result Value Ref Range    Glucose 122 (H) 70 - 99 mg/dL    BUN 16 8 - 23 mg/dL    CREATININE 0.65 0.50 - 0.90 mg/dL    Bun/Cre Ratio NOT REPORTED 9 - 20    Calcium 10.1 8.6 - 10.4 mg/dL    Sodium 132 (L) 135 - 144 mmol/L    Potassium 4.0 3.7 - 5.3 mmol/L    Chloride 91 (L) 98 - 107 mmol/L    CO2 25 20 - 31 mmol/L    Anion Gap 16 9 - 17 mmol/L    GFR Non-African American >60 >60 mL/min    GFR African American >60 >60 mL/min    GFR Comment          GFR Staging NOT REPORTED    Troponin   Result Value Ref Range    Troponin, High Sensitivity 20 (H) 0 - 14 ng/L    Troponin T NOT REPORTED <0.03 ng/mL    Troponin Interp NOT REPORTED    D-Dimer, Quantitative   Result Value Ref Range    D-Dimer, Quant 0.86 mg/L FEU   Urinalysis Reflex to Culture    Specimen: Urine, clean catch   Result Value Ref Range    Color, UA Straw (A) Yellow    Turbidity UA Cloudy (A) Clear    Glucose, Ur NEGATIVE NEGATIVE    Bilirubin Urine NEGATIVE NEGATIVE    Ketones, Urine SMALL (A) NEGATIVE    Specific Gravity, UA 1.025 1.005 - 1.030    Urine Hgb SMALL (A) NEGATIVE    pH, UA 6.0 5.0 - 8.0    Protein, UA 1+ (A) NEGATIVE    Urobilinogen, Urine Normal Normal    Nitrite, Urine POSITIVE (A) NEGATIVE    Leukocyte Esterase, Urine LARGE (A) NEGATIVE    Urinalysis Comments NOT REPORTED    Brain Natriuretic Peptide   Result Value Ref Range    Pro-BNP 84 <300 pg/mL    BNP Interpretation NOT REPORTED    Microscopic Urinalysis   Result Value Ref Range    -          WBC, UA TOO NUMEROUS TO COUNT 0 - 5 /HPF    RBC, UA 5 TO 10 0 - 2 /HPF    Casts UA NOT REPORTED /LPF    Crystals, UA NOT REPORTED None /HPF    Epithelial Cells UA 0 TO 2 0 - 5 /HPF    Renal Epithelial, UA NOT REPORTED 0 /HPF    Bacteria, UA MANY (A) None    Mucus, UA NOT REPORTED None    Trichomonas, UA NOT REPORTED None    Amorphous, UA NOT REPORTED None    Other Observations UA Culture ordered based on defined criteria. (A) NOT REQ.     Yeast, UA NOT REPORTED None   Troponin   Result Value Ref Range    Troponin, High Sensitivity 24 (H) 0 - 14 ng/L    Troponin T NOT REPORTED <0.03 ng/mL    Troponin Interp NOT REPORTED    Troponin   Result Value Ref Range    Troponin, High Sensitivity 26 (H) 0 - 14 ng/L    Troponin T NOT REPORTED <0.03 ng/mL    Troponin Interp NOT REPORTED            EMERGENCY DEPARTMENT COURSE:   Vitals:    Vitals:    10/19/21 1919 10/20/21 0556   BP: (!) 118/96 121/68   Pulse: 97 97   Resp: 20    Temp: 98.6 °F (37 °C) 98.6 °F (37 °C)   TempSrc: Oral Oral   SpO2: 98%    Weight: 94.8 kg (209 lb) 95.4 kg (210 lb 5.1 oz)   Height: 5' 3\" (1.6 m) 5' 3\" (1.6 m) -------------------------  BP: 121/68, Temp: 98.6 °F (37 °C), Pulse: 97, Resp: 20      PERTINENT ATTENDING PHYSICIAN COMMENTS:    The patient is a 41-year-old female who presents for evaluation of bilateral shoulder pain which was difficult to control despite giving multiple rounds of narcotics. She had recent imaging at [place] [address] which showed osteoarthritis and I did review this. Patient has tenderness to the bilateral shoulders exam is unremarkable. She is neurovascularly intact. She has not had any trauma to the shoulders. Vital signs are stable here in the emergency department. Troponins are elevated at 20 and 24 compared to her baseline from 2019. I was unable to see troponins from [facility]. Urinalysis shows signs of infection and she was started on Rocephin. CBC shows a leukocytosis of 15.5 with a slight macrocytic anemia. BMP and BNP are unremarkable. D-dimer is elevated at 0.86 and there is delay in obtaining the CTA of the chest.  CTA ultimately showed no evidence of PE but a large malignant neoplasm in the right lower lobe with extension to the ray had metastasis to the bilateral lungs, subcarinal lymph nodes, and possibly the left hilar lymph nodes. She has a trace left-sided layering pleural effusion. I updated the patient on these findings. She is currently undergoing work-up by her pulmonologist for this. She was awaiting results of a fine-needle biopsy that was done last week. I was able to look up these results and the path report showed no signs of malignancy but there was concerned that they did not obtain a good sample. The patient lives alone and is unable to care for herself at this time secondary to her pain. I think she would benefit from IV antibiotics for her UTI and consult to oncology for suspected cancer. Her EKG shows a slightly irregular rhythm that appears to be sinus but this does not appear to be change from 2019.   Her troponins are trending up and she would benefit from cardiology consult. She has denied any chest pain or shortness of breath. I spoke to the [facility] OPAL, Robel, at 3:46 AM who agrees to admit the patient. Diagnosis:  1. Urinary tract infection with hematuria, site unspecified    2. Chronic pain of both shoulders    3.  Mass of right lung            (Please note that portions of this note were completed with a voice recognition program.  Efforts were made to edit the dictations but occasionally words are mis-transcribed.)    Kindra Sullivan DO, DO  Attending Emergency Medicine Physician        Kindra Sullivan DO  10/20/21 8409

## 2021-10-21 LAB
ALBUMIN SERPL-MCNC: 2.9 G/DL (ref 3.5–5.2)
ALBUMIN/GLOBULIN RATIO: 1.3 (ref 1–2.5)
ALP BLD-CCNC: 105 U/L (ref 35–104)
ALT SERPL-CCNC: 41 U/L (ref 5–33)
ANION GAP SERPL CALCULATED.3IONS-SCNC: 8 MMOL/L (ref 9–17)
AST SERPL-CCNC: 53 U/L
BILIRUB SERPL-MCNC: 0.41 MG/DL (ref 0.3–1.2)
BUN BLDV-MCNC: 19 MG/DL (ref 8–23)
BUN/CREAT BLD: ABNORMAL (ref 9–20)
C-REACTIVE PROTEIN: 197.1 MG/L (ref 0–5)
CALCIUM SERPL-MCNC: 9.6 MG/DL (ref 8.6–10.4)
CHLORIDE BLD-SCNC: 97 MMOL/L (ref 98–107)
CHOLESTEROL/HDL RATIO: 2.2
CHOLESTEROL: 122 MG/DL
CO2: 26 MMOL/L (ref 20–31)
CREAT SERPL-MCNC: 0.65 MG/DL (ref 0.5–0.9)
GFR AFRICAN AMERICAN: >60 ML/MIN
GFR NON-AFRICAN AMERICAN: >60 ML/MIN
GFR SERPL CREATININE-BSD FRML MDRD: ABNORMAL ML/MIN/{1.73_M2}
GFR SERPL CREATININE-BSD FRML MDRD: ABNORMAL ML/MIN/{1.73_M2}
GLUCOSE BLD-MCNC: 128 MG/DL (ref 70–99)
GLUCOSE BLD-MCNC: 88 MG/DL (ref 65–105)
HCT VFR BLD CALC: 28.5 % (ref 36–46)
HDLC SERPL-MCNC: 56 MG/DL
HEMOGLOBIN: 9.4 G/DL (ref 12–16)
LDL CHOLESTEROL: 55 MG/DL (ref 0–130)
MAGNESIUM: 1.1 MG/DL (ref 1.6–2.6)
MAGNESIUM: 1.9 MG/DL (ref 1.6–2.6)
MCH RBC QN AUTO: 34.7 PG (ref 26–34)
MCHC RBC AUTO-ENTMCNC: 33 G/DL (ref 31–37)
MCV RBC AUTO: 105.1 FL (ref 80–100)
NRBC AUTOMATED: ABNORMAL PER 100 WBC
PDW BLD-RTO: 15.6 % (ref 12.5–15.4)
PLATELET # BLD: 245 K/UL (ref 140–450)
PMV BLD AUTO: 7.4 FL (ref 6–12)
POTASSIUM SERPL-SCNC: 3.1 MMOL/L (ref 3.7–5.3)
POTASSIUM SERPL-SCNC: 3.3 MMOL/L (ref 3.7–5.3)
POTASSIUM SERPL-SCNC: 3.8 MMOL/L (ref 3.7–5.3)
RBC # BLD: 2.71 M/UL (ref 4–5.2)
SODIUM BLD-SCNC: 131 MMOL/L (ref 135–144)
TOTAL PROTEIN: 5.2 G/DL (ref 6.4–8.3)
TRIGL SERPL-MCNC: 56 MG/DL
TROPONIN INTERP: ABNORMAL
TROPONIN T: ABNORMAL NG/ML
TROPONIN, HIGH SENSITIVITY: 25 NG/L (ref 0–14)
VLDLC SERPL CALC-MCNC: NORMAL MG/DL (ref 1–30)
WBC # BLD: 13.2 K/UL (ref 3.5–11)

## 2021-10-21 PROCEDURE — 97116 GAIT TRAINING THERAPY: CPT

## 2021-10-21 PROCEDURE — 97535 SELF CARE MNGMENT TRAINING: CPT

## 2021-10-21 PROCEDURE — 6360000002 HC RX W HCPCS: Performed by: NURSE PRACTITIONER

## 2021-10-21 PROCEDURE — 84484 ASSAY OF TROPONIN QUANT: CPT

## 2021-10-21 PROCEDURE — 80061 LIPID PANEL: CPT

## 2021-10-21 PROCEDURE — 80053 COMPREHEN METABOLIC PANEL: CPT

## 2021-10-21 PROCEDURE — 86140 C-REACTIVE PROTEIN: CPT

## 2021-10-21 PROCEDURE — 97162 PT EVAL MOD COMPLEX 30 MIN: CPT

## 2021-10-21 PROCEDURE — APPSS45 APP SPLIT SHARED TIME 31-45 MINUTES: Performed by: NURSE PRACTITIONER

## 2021-10-21 PROCEDURE — 93005 ELECTROCARDIOGRAM TRACING: CPT | Performed by: NURSE PRACTITIONER

## 2021-10-21 PROCEDURE — 2580000003 HC RX 258: Performed by: NURSE PRACTITIONER

## 2021-10-21 PROCEDURE — 36415 COLL VENOUS BLD VENIPUNCTURE: CPT

## 2021-10-21 PROCEDURE — 97166 OT EVAL MOD COMPLEX 45 MIN: CPT

## 2021-10-21 PROCEDURE — 6370000000 HC RX 637 (ALT 250 FOR IP): Performed by: NURSE PRACTITIONER

## 2021-10-21 PROCEDURE — 94640 AIRWAY INHALATION TREATMENT: CPT

## 2021-10-21 PROCEDURE — 82947 ASSAY GLUCOSE BLOOD QUANT: CPT

## 2021-10-21 PROCEDURE — 1210000000 HC MED SURG R&B

## 2021-10-21 PROCEDURE — 6360000002 HC RX W HCPCS

## 2021-10-21 PROCEDURE — 85027 COMPLETE CBC AUTOMATED: CPT

## 2021-10-21 PROCEDURE — 97530 THERAPEUTIC ACTIVITIES: CPT

## 2021-10-21 PROCEDURE — 83735 ASSAY OF MAGNESIUM: CPT

## 2021-10-21 PROCEDURE — 84132 ASSAY OF SERUM POTASSIUM: CPT

## 2021-10-21 PROCEDURE — 99232 SBSQ HOSP IP/OBS MODERATE 35: CPT | Performed by: INTERNAL MEDICINE

## 2021-10-21 RX ORDER — POTASSIUM CHLORIDE 20 MEQ/1
40 TABLET, EXTENDED RELEASE ORAL ONCE
Status: COMPLETED | OUTPATIENT
Start: 2021-10-21 | End: 2021-10-21

## 2021-10-21 RX ORDER — TRAMADOL HYDROCHLORIDE 50 MG/1
50 TABLET ORAL EVERY 8 HOURS PRN
Qty: 15 TABLET | Refills: 0 | Status: SHIPPED | OUTPATIENT
Start: 2021-10-21 | End: 2021-10-22 | Stop reason: HOSPADM

## 2021-10-21 RX ORDER — ALBUTEROL SULFATE 90 UG/1
2 AEROSOL, METERED RESPIRATORY (INHALATION) 2 TIMES DAILY
COMMUNITY

## 2021-10-21 RX ORDER — ASPIRIN 81 MG/1
81 TABLET ORAL
Status: DISCONTINUED | OUTPATIENT
Start: 2021-10-22 | End: 2021-10-25 | Stop reason: HOSPADM

## 2021-10-21 RX ORDER — ALBUTEROL SULFATE 2.5 MG/3ML
SOLUTION RESPIRATORY (INHALATION)
Status: COMPLETED
Start: 2021-10-21 | End: 2021-10-21

## 2021-10-21 RX ORDER — FUROSEMIDE 20 MG/1
20 TABLET ORAL DAILY
Qty: 60 TABLET | Refills: 3 | Status: SHIPPED | OUTPATIENT
Start: 2021-10-21 | End: 2021-10-21 | Stop reason: HOSPADM

## 2021-10-21 RX ORDER — ALBUTEROL SULFATE 90 UG/1
2 AEROSOL, METERED RESPIRATORY (INHALATION) 2 TIMES DAILY
Status: DISCONTINUED | OUTPATIENT
Start: 2021-10-21 | End: 2021-10-25 | Stop reason: HOSPADM

## 2021-10-21 RX ORDER — MAGNESIUM SULFATE IN WATER 40 MG/ML
2000 INJECTION, SOLUTION INTRAVENOUS ONCE
Status: COMPLETED | OUTPATIENT
Start: 2021-10-21 | End: 2021-10-21

## 2021-10-21 RX ORDER — OXYBUTYNIN CHLORIDE 15 MG/1
15 TABLET, EXTENDED RELEASE ORAL DAILY
Qty: 30 TABLET | Refills: 3 | Status: SHIPPED | OUTPATIENT
Start: 2021-10-21 | End: 2021-10-21 | Stop reason: HOSPADM

## 2021-10-21 RX ORDER — ALBUTEROL SULFATE 2.5 MG/3ML
2.5 SOLUTION RESPIRATORY (INHALATION) EVERY 4 HOURS PRN
Status: DISCONTINUED | OUTPATIENT
Start: 2021-10-21 | End: 2021-10-25 | Stop reason: HOSPADM

## 2021-10-21 RX ORDER — BUDESONIDE AND FORMOTEROL FUMARATE DIHYDRATE 160; 4.5 UG/1; UG/1
2 AEROSOL RESPIRATORY (INHALATION) 2 TIMES DAILY
Status: DISCONTINUED | OUTPATIENT
Start: 2021-10-21 | End: 2021-10-25 | Stop reason: HOSPADM

## 2021-10-21 RX ORDER — LISINOPRIL 2.5 MG/1
2.5 TABLET ORAL DAILY
Status: DISCONTINUED | OUTPATIENT
Start: 2021-10-22 | End: 2021-10-24

## 2021-10-21 RX ORDER — BUDESONIDE AND FORMOTEROL FUMARATE DIHYDRATE 160; 4.5 UG/1; UG/1
2 AEROSOL RESPIRATORY (INHALATION) 2 TIMES DAILY
COMMUNITY

## 2021-10-21 RX ORDER — CEFUROXIME AXETIL 250 MG/1
250 TABLET ORAL 2 TIMES DAILY
Qty: 14 TABLET | Refills: 0 | Status: SHIPPED | OUTPATIENT
Start: 2021-10-21 | End: 2021-10-23 | Stop reason: SDUPTHER

## 2021-10-21 RX ADMIN — ACETAMINOPHEN 650 MG: 325 TABLET ORAL at 12:24

## 2021-10-21 RX ADMIN — MULTIPLE VITAMINS W/ MINERALS TAB 1 TABLET: TAB at 09:04

## 2021-10-21 RX ADMIN — ALLOPURINOL 300 MG: 300 TABLET ORAL at 09:04

## 2021-10-21 RX ADMIN — MAGNESIUM SULFATE 2000 MG: 2 INJECTION INTRAVENOUS at 09:06

## 2021-10-21 RX ADMIN — PREDNISONE 10 MG: 10 TABLET ORAL at 09:04

## 2021-10-21 RX ADMIN — POTASSIUM CHLORIDE 40 MEQ: 1500 TABLET, EXTENDED RELEASE ORAL at 09:03

## 2021-10-21 RX ADMIN — HYDROCODONE BITARTRATE AND ACETAMINOPHEN 1 TABLET: 5; 325 TABLET ORAL at 12:24

## 2021-10-21 RX ADMIN — HYDROXYCHLOROQUINE SULFATE 200 MG: 200 TABLET, FILM COATED ORAL at 09:04

## 2021-10-21 RX ADMIN — CEFTRIAXONE SODIUM 1000 MG: 1 INJECTION, POWDER, FOR SOLUTION INTRAMUSCULAR; INTRAVENOUS at 00:35

## 2021-10-21 RX ADMIN — OXYCODONE HYDROCHLORIDE AND ACETAMINOPHEN 500 MG: 500 TABLET ORAL at 09:04

## 2021-10-21 RX ADMIN — LEVOTHYROXINE SODIUM 88 MCG: 88 TABLET ORAL at 06:26

## 2021-10-21 RX ADMIN — POTASSIUM CHLORIDE 40 MEQ: 1500 TABLET, EXTENDED RELEASE ORAL at 13:48

## 2021-10-21 RX ADMIN — DIPHENHYDRAMINE HYDROCHLORIDE 25 MG: 25 TABLET ORAL at 22:25

## 2021-10-21 RX ADMIN — ENOXAPARIN SODIUM 40 MG: 40 INJECTION SUBCUTANEOUS at 09:03

## 2021-10-21 RX ADMIN — VITAMIN C 1 TABLET: TAB at 09:04

## 2021-10-21 RX ADMIN — ATORVASTATIN CALCIUM 40 MG: 40 TABLET, FILM COATED ORAL at 22:25

## 2021-10-21 RX ADMIN — CETIRIZINE HYDROCHLORIDE 5 MG: 10 TABLET, FILM COATED ORAL at 09:04

## 2021-10-21 RX ADMIN — SODIUM CHLORIDE, PRESERVATIVE FREE 10 ML: 5 INJECTION INTRAVENOUS at 22:26

## 2021-10-21 RX ADMIN — PANTOPRAZOLE SODIUM 40 MG: 40 TABLET, DELAYED RELEASE ORAL at 09:09

## 2021-10-21 RX ADMIN — BUDESONIDE AND FORMOTEROL FUMARATE DIHYDRATE 2 PUFF: 160; 4.5 AEROSOL RESPIRATORY (INHALATION) at 22:26

## 2021-10-21 RX ADMIN — Medication 400 UNITS: at 09:34

## 2021-10-21 RX ADMIN — CALCIUM CARBONATE 500 MG: 500 TABLET, CHEWABLE ORAL at 09:03

## 2021-10-21 RX ADMIN — Medication 2000 UNITS: at 09:04

## 2021-10-21 RX ADMIN — ALBUTEROL SULFATE 2.5 MG: 2.5 SOLUTION RESPIRATORY (INHALATION) at 07:08

## 2021-10-21 RX ADMIN — ALBUTEROL SULFATE 2.5 MG: 2.5 SOLUTION RESPIRATORY (INHALATION) at 07:03

## 2021-10-21 RX ADMIN — HYDROXYCHLOROQUINE SULFATE 200 MG: 200 TABLET, FILM COATED ORAL at 22:25

## 2021-10-21 RX ADMIN — ALBUTEROL SULFATE 2 PUFF: 90 AEROSOL, METERED RESPIRATORY (INHALATION) at 22:26

## 2021-10-21 RX ADMIN — SODIUM CHLORIDE, PRESERVATIVE FREE 10 ML: 5 INJECTION INTRAVENOUS at 09:05

## 2021-10-21 ASSESSMENT — PAIN SCALES - GENERAL
PAINLEVEL_OUTOF10: 5
PAINLEVEL_OUTOF10: 0
PAINLEVEL_OUTOF10: 8
PAINLEVEL_OUTOF10: 0
PAINLEVEL_OUTOF10: 8

## 2021-10-21 ASSESSMENT — PAIN DESCRIPTION - ONSET: ONSET: ON-GOING

## 2021-10-21 ASSESSMENT — PAIN DESCRIPTION - FREQUENCY: FREQUENCY: CONTINUOUS

## 2021-10-21 ASSESSMENT — ENCOUNTER SYMPTOMS
ABDOMINAL PAIN: 0
SHORTNESS OF BREATH: 1
CHEST TIGHTNESS: 0
DIARRHEA: 0
BLOOD IN STOOL: 0
WHEEZING: 0
CONSTIPATION: 0
VOMITING: 0
COUGH: 0
NAUSEA: 0

## 2021-10-21 ASSESSMENT — PAIN DESCRIPTION - PAIN TYPE
TYPE: ACUTE PAIN
TYPE: ACUTE PAIN
TYPE: ACUTE PAIN;CHRONIC PAIN

## 2021-10-21 ASSESSMENT — PAIN DESCRIPTION - DESCRIPTORS
DESCRIPTORS: ACHING
DESCRIPTORS: ACHING
DESCRIPTORS: ACHING;DISCOMFORT;SORE

## 2021-10-21 ASSESSMENT — PAIN DESCRIPTION - ORIENTATION
ORIENTATION: RIGHT;LEFT
ORIENTATION: RIGHT;LEFT

## 2021-10-21 ASSESSMENT — PAIN - FUNCTIONAL ASSESSMENT
PAIN_FUNCTIONAL_ASSESSMENT: PREVENTS OR INTERFERES WITH ALL ACTIVE AND SOME PASSIVE ACTIVITIES
PAIN_FUNCTIONAL_ASSESSMENT: ACTIVITIES ARE NOT PREVENTED

## 2021-10-21 ASSESSMENT — PAIN DESCRIPTION - LOCATION: LOCATION: GENERALIZED

## 2021-10-21 ASSESSMENT — PAIN DESCRIPTION - PROGRESSION: CLINICAL_PROGRESSION: GRADUALLY WORSENING

## 2021-10-21 NOTE — CONSULTS
[facility] Cardiology Cardiology    Consult                        Today's Date: 10/21/2021  Patient Name: Yoav Ivey  Date of admission: 10/19/2021  7:15 PM  Patient's age: [de-identified] y.o., 1940  Admission Dx: Weakness [R53.1]  Chronic pain of both shoulders [M25.511, G89.29, M25.512]  Urinary tract infection with hematuria, site unspecified [N39.0, R31.9]  Mass of right lung [R91.8]    Reason for Consult:  Cardiac evaluation    Requesting Physician: Robbie Cary DO    CHIEF COMPLAINT:  dizziness    History Obtained From:  patient, electronic medical record    HISTORY OF PRESENT ILLNESS:    Yoav Ivey is a [de-identified] y.o. female  who presents to the ED via ems with bilateral shoulder pain and weakness. Patient states that she was doing dishes when she became dizzy, then walked to her chair to sit down to relax. States that her bilateral shoulder pain she was unable to get out of her chair until her next-door neighbor came over today and help drive a chair. Patient states that she has had bilateral shoulder pain ongoing for months and has been diagnosed with bilateral arthritis. She denies any focal deficits, chest pain, shortness of breath, abdominal pain, nausea vomiting diarrhea diarrhea. Per EMS report, the EKG showed A. fib, patient denies any history of A. fib therefore this is a new onset at this time. Patient seen and examined in room after discussion with hospitalist.  Concerns regarding possible a fib/flutter on tele but not found on ECG. Reviewed ECGs available SR. Telemetry reviewed appeared to be artifact. No history of A Fib per patient. Denies chest pain, palpitations, or dizziness. Episode of SOB this am previous to respiratory treatment. Follows with pulmonologist and undergoing OP work up for Right lung mass.   SR on tele HR 74       Past Medical History:   has a past medical history of Arthritis, Asthma, Back pain, Caffeine use, Carpal tunnel syndrome, Constipation, Dizziness, Gout, Hearing loss, History of blood transfusion, Hypertension, Obesity, Osteoarthritis, PONV (postoperative nausea and vomiting), Sleep apnea, Thyroid disease, Urinary incontinence, Urine incontinence, and Wears hearing aid. Past Surgical History:   has a past surgical history that includes Tonsillectomy; Hysterectomy; knee surgery (Left); Foot surgery (Right); Cystoscopy; Cataract removal (Bilateral); joint replacement (Left, 2011); Knee arthroscopy (Left); Carpal tunnel release (Bilateral); Colonoscopy; Cataract removal with implant (Bilateral); and pr total knee arthroplasty (Right, 7/17/2018). Home Medications:    Prior to Admission medications    Medication Sig Start Date End Date Taking? Authorizing Provider   metFORMIN (GLUCOPHAGE) 500 MG tablet Take 500 mg by mouth 2 times daily (with meals) Pt takes for appetite suppressant   Yes Historical Provider, MD   aspirin 81 MG chewable tablet Take 81 mg by mouth daily   Yes Historical Provider, MD   NONFORMULARY CBD oil   Yes Historical Provider, MD   diazePAM (VALIUM) 5 MG tablet Take 5 mg by mouth every 6 hours as needed for Anxiety.    Yes Historical Provider, MD   Omega-3 Fatty Acids (FISH OIL PO) Take by mouth   Yes Historical Provider, MD   FOLIC ACID PO Take by mouth   Yes Historical Provider, MD   lisinopril (PRINIVIL;ZESTRIL) 10 MG tablet Take 5 mg by mouth daily   Yes Historical Provider, MD   methotrexate (RHEUMATREX) 2.5 MG chemo tablet Take by mouth once a week 5 tablets   Yes Historical Provider, MD   predniSONE (DELTASONE) 5 MG tablet Take 5 mg by mouth daily   Yes Historical Provider, MD   Cyanocobalamin (VITAMIN B12 PO) Take by mouth once a week   Yes Historical Provider, MD   VITAMIN D PO Take 2,000 % by mouth every morning   Yes Historical Provider, MD   hydroxychloroquine (PLAQUENIL) 200 MG tablet Take 200 mg by mouth 2 times daily   Yes Historical Provider, MD   Cinnamon 500 MG CAPS Take 600 mg by mouth daily   Yes Historical Provider, MD   b complex vitamins capsule Take 1 capsule by mouth daily   Yes Historical Provider, MD   TURMERIC PO Take 720 mg by mouth 2 times daily   Yes Historical Provider, MD   vitamin C (ASCORBIC ACID) 500 MG tablet Take 500 mg by mouth daily   Yes Historical Provider, MD   Cholecalciferol (VITAMIN D) 2000 units CAPS capsule Take 1 capsule by mouth daily   Yes Historical Provider, MD   vitamin E 400 UNIT capsule Take 400 Units by mouth daily   Yes Historical Provider, MD   calcium carbonate (OSCAL) 500 MG TABS tablet Take 500 mg by mouth 2 times daily   Yes Historical Provider, MD   Multiple Vitamins-Minerals (THERAPEUTIC MULTIVITAMIN-MINERALS) tablet Take 1 tablet by mouth daily   Yes Historical Provider, MD   Glucosamine-Chondroit-Vit C-Mn (GLUCOSAMINE 1500 COMPLEX) CAPS Take 1 capsule by mouth daily   Yes Historical Provider, MD   Coenzyme Q10 (COQ10) 100 MG CAPS Take 1 capsule by mouth daily   Yes Historical Provider, MD   Resveratrol 100 MG CAPS Take 1 capsule by mouth daily   Yes Historical Provider, MD   Niacinamide 500 MG TBCR Take 1,500 mg by mouth 2 times daily   Yes Historical Provider, MD   ondansetron (ZOFRAN ODT) 4 MG disintegrating tablet Take 1 tablet by mouth every 8 hours as needed for Nausea 2/7/19  Yes Augustus Araujo MD   meclizine (ANTIVERT) 25 MG tablet Take 1 tablet by mouth 3 times daily as needed for Dizziness 2/7/19  Yes Augustus Araujo MD   hydrocortisone 2.5 % cream Apply topically 2 times daily Apply topically 2 times daily.    Yes Historical Provider, MD   Melatonin 5 MG CAPS Take by mouth   Yes Historical Provider, MD   cetirizine (ZYRTEC) 10 MG tablet Take 10 mg by mouth daily   Yes Historical Provider, MD   levothyroxine (SYNTHROID) 88 MCG tablet Take 88 mcg by mouth Daily   Yes Historical Provider, MD   omeprazole (PRILOSEC) 20 MG delayed release capsule Take 40 mg by mouth 2 times daily    Yes Historical Provider, MD   polyethylene glycol (GLYCOLAX) packet Take 17 g by mouth daily as needed for Constipation   Yes Historical Provider, MD   traMADol (ULTRAM) 50 MG tablet Take 50 mg by mouth every 8 hours as needed for Pain. Yes Historical Provider, MD   diphenhydrAMINE (BENADRYL) 25 MG capsule Take 25 mg by mouth nightly    Yes Historical Provider, MD   allopurinol (ZYLOPRIM) 300 MG tablet Take 300 mg by mouth daily   Yes Historical Provider, MD   furosemide (LASIX) 20 MG tablet Take 20 mg by mouth daily     Historical Provider, MD   oxybutynin (DITROPAN XL) 15 MG extended release tablet Take 15 mg by mouth daily    Historical Provider, MD       Allergies:  Povidone-iodine, Hydrochlorothiazide, Meperidine, and Naproxen    Social History:   reports that she quit smoking about 46 years ago. She has a 4.25 pack-year smoking history. She has never used smokeless tobacco. She reports current alcohol use. She reports that she does not use drugs. Family History: family history includes Heart Disease in her father; Heart Failure in her mother. REVIEW OF SYSTEMS:    Constitutional: there has been no unanticipated weight loss. There's been No change in energy level, No change in activity level. Eyes: No visual changes or diplopia. No scleral icterus. ENT: No Headaches, hearing loss or vertigo. No mouth sores or sore throat. Cardiovascular: see above  Respiratory: see above  Gastrointestinal: No abdominal pain, appetite loss, blood in stools. Genitourinary: No dysuria, trouble voiding, or hematuria. Musculoskeletal:  No gait disturbance, No weakness or joint complaints. Integumentary: No rash or pruritis. Neurological: No headache or diplopia. No tingling  Psychiatric: No anxiety, or depression. Endocrine: No temperature intolerance. Hematologic/Lymphatic: No abnormal bruising or bleeding, blood clots or swollen lymph nodes. Allergic/Immunologic: No nasal congestion or hives.       PHYSICAL EXAM:      BP (!) 113/55   Pulse 73   Temp 97.9 °F (36.6 °C) (Oral)   Resp 18 Ht 5' 3\" (1.6 m)   Wt 213 lb 13.5 oz (97 kg)   SpO2 98%   BMI 37.88 kg/m²    Constitutional and General Appearance: alert, cooperative, no distress and appears stated age  [de-identified]: PERRL, no cervical lymphadenopathy. No masses palpable. Normal oral mucosa  Respiratory:  Normal excursion and expansion without use of accessory muscles  Resp Auscultation: Good respiratory effort. No for increased work of breathing. On auscultation: clear to auscultation bilaterally  Cardiovascular:  Heart tones are crisp and normal. regular S1 and S2.  Jugular venous pulsation Normal  The carotid upstroke is normal in amplitude and contour without delay or bruit   Abdomen:   soft  Bowel sounds present  Extremities:   No edema    Right had edema noted. Neurological:  Alert and oriented. DATA:    Diagnostics:    EKG: normal EKG, normal sinus rhythm, unchanged from previous tracings. ECHO: 10/20/2021  Summary  Technically difficult study. Left ventricle is normal in size, mildly increased left ventricular wall  thickness. Global left ventricular systolic function is normal, calculated EF 67.4%. No  obvious wall motion abnormalities are noted. Trivial to mild mitral regurgitation. Mild tricuspid regurgitation. Moderate pulmonary hypertension with an  estimated right ventricular systolic pressure of 53 mmHg. No pericardial effusion seen. .     Stress Test: not obtained. Cardiac Angiography: not obtained. Labs:     CBC:   Recent Labs     10/19/21  2017 10/21/21  0408   WBC 15.5* 13.2*   HGB 10.8* 9.4*   HCT 31.9* 28.5*    245     BMP:   Recent Labs     10/19/21  2017 10/21/21  0408   * 131*   K 4.0 3.3*   CO2 25 26   BUN 16 19   CREATININE 0.65 0.65   LABGLOM >60 >60   GLUCOSE 122* 128*     BNP: No results for input(s): BNP in the last 72 hours. PT/INR: No results for input(s): PROTIME, INR in the last 72 hours. APTT:No results for input(s): APTT in the last 72 hours.   CARDIAC ENZYMES:No results for input(s): CKTOTAL, CKMB, CKMBINDEX, TROPONINI in the last 72 hours. FASTING LIPID PANEL:  Lab Results   Component Value Date    HDL 56 10/21/2021    LDLCALC 72 08/08/2017    TRIG 56 10/21/2021     LIVER PROFILE:  Recent Labs     10/21/21  0408   AST 53*   ALT 41*   LABALBU 2.9*       IMPRESSION:    Patient Active Problem List   Diagnosis    Urgency of urination    Anemia    Apnea    Arthropathia    Benign essential HTN    Chronic renal impairment    DDD (degenerative disc disease), lumbar    Disorder of bone and articular cartilage    Disorder of lipid metabolism    Edema    Shortness of breath    Enthesopathy of hip    Glaucoma associated with anterior segment anomaly    Gout    Gouty arthropathy    Hyponatremia    Hypoactive thyroid    Elevated fasting blood sugar    Inflammation of sacroiliac joint (HCC)    Insomnia    Iron deficiency anemia    Primary localized osteoarthritis    Arthritis, degenerative, localized, primary, hand    Lumbosacral neuritis    Increased MCV    Menopausal symptom    Obstructive apnea    Arthritis of knee, degenerative    Osteoporosis    Urinary incontinence    Varicose veins of lower extremity    Chronic low back pain    Lumbar disc herniation with radiculopathy    Acquired spondylolisthesis    Lumbar stenosis with neurogenic claudication    Lumbar radicular pain    Coagulopathy (HCC)    Weakness    Acute cystitis without hematuria    Urinary tract infection with hematuria    Obesity (BMI 30-39. 9)    Mass of right lung       RECOMMENDATIONS:  SR on tele with no evidence of A fib/flutter on ECGs. Telemetry with concern for possible A fib that appears to be artifact. Reviewed with Dr Real Dacosta. No history of A fib/flutter. Will order 48 hour holter monitor on discharge to evaluate. Flat troponin 20 24 26 26 25 25. Type II secondary to UTI  Chest pain free and on ischemic changes on ECG. ECHO with normal EF and no WMA. Chronic dizziness. Denies current dizziness. VSS.    Keep K > 4.0 and

## 2021-10-21 NOTE — PROGRESS NOTES
[facility]  Office: [address], DO, Reyes China, DO, Jose L Pressley, DO, Partha Daniel Blood, DO, Ronn Thompson MD, Porter Gale MD, Maryjo Muhammad MD, Trista Zafar MD, Lyle Wallace MD, Jama Judd MD, Sarah Harris MD, Charissa Nevarez MD, Myah Ly, DO, Jm Amaya, DO, Oleg Obregon MD,  Mckayla Mina, DO, Shauna Wren MD, Blade Tiwari MD, Ria Marrero MD, Sang Heart MD, Fany Tariq MD, Hannah Apple MD, Clark Lewis, [facility], [facility], CNP, Vicki Miller, CNP, Renetta Lino, CNS, Michell Vargas, CNP, Migdalia Strauss, CNP, Adiel Zarate, CNP, Che Mayo, CNP, Erika Pires, CNP, Melita Avila, CNP, Leidy Moser PA-C, Charmel Osgood, [facility], Razia Baeza, CNP, Vickie Limon, CNP, Selin Ellis, CNP, Halie Mina, CNP, Tiney Schilder, CNP, Marlyne Osgood, CNP, Jasmin Luis [address]    Progress Note    10/21/2021    9:49 AM    Name:   Xander Nunez  MRN:     [id number]     Acct:      [de-identified]   Room:   [address] [place] [address] Day:  1  Admit Date:  10/19/2021  7:15 PM    PCP:   Nataliia Moseley  Code Status:  Full Code    Subjective:     C/C:   Chief Complaint   Patient presents with    Pain     pt c/o arthritic pain in bilateral shoulders and bilateral wrists. Patient unable to get up the whole night and day    Dizziness     Interval History Status: no changed. She denies dizziness today    She states her shoulders are not too bad today but the right wrist is . She agrees it seems a little less swollen and not quite is red as it was but it is still sore. I encouraged her to elevate it is much as possible. She felt very weak this morning and was concerned she couldn't ambulate. She is up in a recliner during my assessment. Chest pain and sob this am that got better after a breathing treatment.  No sob during my assessment    Replacing magnesium and potassium    Echo reviewed Global left ventricular systolic function is normal, calculated EF 67.4%. No obvious wall motion abnormalities are noted. Brief History:     Dre Ahmadi is a [de-identified] y.o. Non- / non  female who presents with Pain (pt c/o arthritic pain in bilateral shoulders and bilateral wrists. Patient unable to get up the whole night and day) and Dizziness   and is admitted to the hospital for the management of Acute cystitis without hematuria.     The patient presented to the er related to weakness she believes was related to her chronic arthritis. Patient informed staff that she had to stop her normal arthritis medications for a few days last week for a needle biopsy last week at [place] [address]. The patient states that she was seen by her pulmonologist, Dr. Bernardino Mayo, at [place] [address] last week for a lung biopsy.  She states that they could not get clear biopsies during bronchoscopy and she was sent for a needle biopsy by IR.  The patient is still awaiting the results of her path report. Garett Lawson patient states that while she was admitted she did have x-rays of her shoulders because of chronic pain which showed osteoarthritis. She also reports chronic dizziness. The patient reports that she has almost daily dizziness as if the room is spinning.  She states that she sees a neuro otolaryngologist for this and has been talking to him about placing a cochlear implant to help control her dizziness. During my assessment she also complains of shortness of breath with activity that has been going on for few weeks. She states she does not notice the shortness of breath much at rest but with stairs or much activity she does become winded.      According to the ER note EMS was concerned the patient may have had A. fib in route. 2 EKGs done in our ER show a sinus rhythm with ectopy but no A. fib.   I then completed an EKG on the floor because her rhythm did seem irregular per telemetry and on auscultation but again it just shows a sinus arrhythmia. Patient denies any history of heart arrhythmias or palpitations.     During my assessment today she does complain of pain to her shoulders bilaterally and to her wrist bilaterally right greater than left with some improvement in her pain since admission.      Troponins resulted at 20, 26 and then 26. States she may had a little bit of left chest pain but it felt more like it was shoulder related. She denies nausea, vomiting, or diaphoresis. She does report some shortness of breath but she has had that for a few weeks. WBCs are 15.5 UA shows positive nitrates, large leukocyte esterase, too many to count WBCs many bacteria. Patient's been placed on Rocephin IV pending culture results. She denies urinary symptoms    I asked cardiology to review her case and decide wether or not there is a concern for afib/flutter. Per the cardiology note they plan to order a 48-hour Holter monitor upon discharge to evaluate her rhythm. I spoke with the patient at length about possibly going to a facility at discharge. She is pretty adamant that she does not want to go anywhere related to Covid concerns. I explained to her the concerns of her getting home and not being able to help herself much as far as getting food prepared and her ADLs. She did ultimately agreed to let the  to look into [place] but she feels like she wants to probably go home. She would like to speak to her daughter in little bit. Planning to discharge once arrangements are made    Planning to transition Rocephin to Ceftin p.o. Review of Systems:     Review of Systems   Constitutional: Negative for chills, diaphoresis and fever. HENT: Negative for congestion. Eyes: Negative for visual disturbance. Respiratory: Positive for shortness of breath. Negative for cough, chest tightness and wheezing. Cardiovascular: Positive for chest pain.  Negative for palpitations and leg potassium chloride, magnesium sulfate, nitroGLYCERIN, HYDROcodone 5 mg - acetaminophen **OR** HYDROcodone 5 mg - acetaminophen, sodium chloride flush    Data:     Past Medical History:   has a past medical history of Arthritis, Asthma, Back pain, Caffeine use, Carpal tunnel syndrome, Constipation, Dizziness, Gout, Hearing loss, History of blood transfusion, Hypertension, Obesity, Osteoarthritis, PONV (postoperative nausea and vomiting), Sleep apnea, Thyroid disease, Urinary incontinence, Urine incontinence, and Wears hearing aid. Social History:   reports that she quit smoking about 46 years ago. She has a 4.25 pack-year smoking history. She has never used smokeless tobacco. She reports current alcohol use. She reports that she does not use drugs. Family History:   Family History   Problem Relation Age of Onset    Heart Failure Mother     Heart Disease Father        Vitals:  BP (!) 113/55   Pulse 73   Temp 97.9 °F (36.6 °C) (Oral)   Resp 18   Ht 5' 3\" (1.6 m)   Wt 213 lb 13.5 oz (97 kg)   SpO2 98%   BMI 37.88 kg/m²   Temp (24hrs), Av.9 °F (36.6 °C), Min:97.8 °F (36.6 °C), Max:98.1 °F (36.7 °C)    Recent Labs     10/21/21  0810   POCGLU 88       I/O (24Hr):     Intake/Output Summary (Last 24 hours) at 10/21/2021 0949  Last data filed at 10/20/2021 1637  Gross per 24 hour   Intake 855 ml   Output 550 ml   Net 305 ml       Labs:  Hematology:  Recent Labs     10/19/21  2017 10/20/21  1115 10/21/21  0408   WBC 15.5*  --  13.2*   RBC 3.05*  --  2.71*   HGB 10.8*  --  9.4*   HCT 31.9*  --  28.5*   .7*  --  105.1*   MCH 35.4*  --  34.7*   MCHC 33.8  --  33.0   RDW 15.9*  --  15.6*     --  245   MPV 7.7  --  7.4   SEDRATE 55*  --   --    CRP  --  194.8* 197.1*   DDIMER 0.86  --   --      Chemistry:  Recent Labs     10/19/21  2017 10/20/21  0243 10/20/21  1115 10/20/21  2022 10/21/21  0408   *  --   --   --  131*   K 4.0  --   --   --  3.3*   CL 91*  --   --   --  97*   CO2 25  --   -- the right lower lobe with extension to the ray and metastasis to the bilateral lungs, subcarinal lymph nodes, and possibly the left hilar lymph nodes. Trace left-sided layering pleural effusion. Physical Examination:     Physical Exam  Vitals and nursing note reviewed. Constitutional:       Appearance: Normal appearance. HENT:      Mouth/Throat:      Mouth: Mucous membranes are moist.   Eyes:      Extraocular Movements: Extraocular movements intact. Cardiovascular:      Rate and Rhythm: Normal rate. Rhythm irregular. Pulses: Normal pulses. Pulmonary:      Effort: Pulmonary effort is normal.      Breath sounds: Normal breath sounds. Abdominal:      General: Bowel sounds are normal.      Palpations: Abdomen is soft. Musculoskeletal:         General: Normal range of motion. Right wrist: Swelling and tenderness present. Left wrist: Tenderness present. Skin:     General: Skin is warm and dry. Capillary Refill: Capillary refill takes less than 2 seconds. Neurological:      Mental Status: She is alert and oriented to person, place, and time. Psychiatric:         Mood and Affect: Mood normal.         Assessment:     Hospital Problems         Last Modified POA    * (Principal) Acute cystitis without hematuria 10/20/2021 Yes    Benign essential HTN 10/20/2021 Yes    Shortness of breath 10/20/2021 Yes    Gouty arthropathy 10/20/2021 Yes    Hyponatremia 10/20/2021 Yes    Urinary incontinence 10/20/2021 Yes    Weakness 10/20/2021 Yes    Urinary tract infection with hematuria 10/20/2021 Yes    Obesity (BMI 30-39.9) 10/20/2021 Yes    Mass of right lung 10/20/2021 Yes          Plan:     Acute cystitis without hematuria  Rocephin transition to p.o. Ceftin for discharge       Hypertension  Continue home lisinopril and monitor for hypertension.   Hold Lasix for now    Cardiology consulted to evaluate heart rhythm  Plan for Holter monitor and cardiology follow-up as outpatient     Hyponatremia  Improved     Weakness  PT/OT to evaluate and treat  Patient would benefit from continued therapy. SNF placement recommended. Case management assisting with discharge planning     Continue home Plaquenil, Synthroid, methotrexate, Ditropan, PPI, and multivitamin.   Hold Glucophage related to contrast.  Increase prednisone to 10 mg daily for now     Monitor and replace electrolytes as needed     DVT prophylaxis     Norco for pain    Patient encouraged to follow-up with her rheumatologist related to arthritis/joint swelling  She is encouraged to follow-up with Dr. Jaxson Ozuna  She is also encouraged to follow-up with her PCP in 7 to 10 days    Magdalene Last, COBY - CNP  10/21/2021  9:49 AM

## 2021-10-21 NOTE — FLOWSHEET NOTE
[facility], [address] [facility] Radiation Oncology  10/21/2021  11:31 AM

## 2021-10-21 NOTE — PROGRESS NOTES
Occupational Therapy   Occupational Therapy Initial Assessment  Date: 10/21/2021   Patient Name: Heath Machado  MRN: [id number]     : 1940    Date of Service: 10/21/2021    Chief Complaint   Patient presents with    Pain     pt c/o arthritic pain in bilateral shoulders and bilateral wrists. Patient unable to get up the whole night and day    Dizziness     Discharge Recommendations:  Patient would benefit from continued therapy after discharge  OT Equipment Recommendations  Equipment Needed: Yes  Mobility Devices: ADL Assistive Devices  ADL Assistive Devices: Transfer Tub Bench    Assessment   Performance deficits / Impairments: Decreased functional mobility ; Decreased ADL status; Decreased ROM; Decreased strength;Decreased safe awareness;Decreased cognition;Decreased endurance;Decreased balance;Decreased high-level IADLs;Decreased fine motor control;Decreased coordination  Comments: Pt unsafe to return to prior living arrangements at this time based on pt's current functional ability as pt requiring CGA-min A during engagement in functional transfers/functional mobility and min A-max A during engagement in ADL tasks. Pt with significant BUE ROM/strength deficits and decreased activity tolerance limiting pt's ability to engage and complete self care tasks. Pt to benefit from continued therapy services while hospitalized and at discharge to maximize pt's safety and independence in performing functional tasks. 24hr assistance recommended at discharge. Prognosis: Good;Fair  Decision Making: Medium Complexity  OT Education: Plan of Care;OT Role;ADL Adaptive Strategies;Precautions;Transfer Training;Equipment (Activity Promotion, Safety Awareness/Fall Prevention, Safety with Transfers/RW Mngt, Compensatory ADL Techniques, Use of Built-Up Handles, ADL/IADL/Home Safety; fair return)  REQUIRES OT FOLLOW UP: Yes  Activity Tolerance  Activity Tolerance: Patient limited by pain; Patient limited by fatigue (and dizziness)  Safety Devices  Safety Devices in place: Yes  Type of devices: Call light within reach;Nurse notified; Left in chair;Chair alarm in place  Restraints  Initially in place: No           Patient Diagnosis(es): The primary encounter diagnosis was Urinary tract infection with hematuria, site unspecified. Diagnoses of Chronic pain of both shoulders and Mass of right lung were also pertinent to this visit. has a past medical history of Arthritis, Asthma, Back pain, Caffeine use, Carpal tunnel syndrome, Constipation, Dizziness, Gout, Hearing loss, History of blood transfusion, Hypertension, Obesity, Osteoarthritis, PONV (postoperative nausea and vomiting), Sleep apnea, Thyroid disease, Urinary incontinence, Urine incontinence, and Wears hearing aid. has a past surgical history that includes Tonsillectomy; Hysterectomy; knee surgery (Left); Foot surgery (Right); Cystoscopy; Cataract removal (Bilateral); joint replacement (Left, 2011); Knee arthroscopy (Left); Carpal tunnel release (Bilateral); Colonoscopy; Cataract removal with implant (Bilateral); and pr total knee arthroplasty (Right, 7/17/2018). Restrictions  Restrictions/Precautions  Restrictions/Precautions: Fall Risk, Up as Tolerated  Required Braces or Orthoses?: No  Implants present? : Metal implants  Position Activity Restriction  Other position/activity restrictions: Up with assist    Subjective   General  Patient assessed for rehabilitation services?: Yes  Family / Caregiver Present: No  General Comment  Comments: RN ok'd for therapy this AM. Pt agreeable to participate in session and pleasant/cooperative throughout. Patient Currently in Pain: Yes  Pain Assessment  Pain Assessment: 0-10  Pain Level: 8  Pain Level: 8  Pain Type: Acute pain;Chronic pain  Pain Location: Rib cage; Wrist;Arm;Hand  Pain Orientation: Right;Left  Pain Descriptors: Aching  Non-Pharmaceutical Pain Intervention: Ambulation/Increased Activity, Emotional Support, Distraction  Patient Response: Patient Satisfied    Oxygen Therapy  SpO2: 95 %  Pulse Oximeter Device Mode: Intermittent  Pulse Oximeter Device Location: Finger  O2 Device: None (Room air)     Social/Functional History  Social/Functional History  Lives With: Alone  Type of Home: House (Duplex)  Home Layout: One level  Home Access: Stairs to enter with rails  Entrance Stairs - Number of Steps: 2  Entrance Stairs - Rails: Left  Bathroom Shower/Tub: Tub/Shower unit  Bathroom Toilet: Standard  Bathroom Equipment: Toilet raiser  Home Equipment: Rolling walker, Cane  Receives Help From: Family, Neighbor, Friend(s)  ADL Assistance: Independent  Homemaking Assistance: Independent  Homemaking Responsibilities: Yes  Ambulation Assistance: Independent  Transfer Assistance: Independent  Active : Yes  Mode of Transportation: Car  Occupation: Retired       Objective   Vision: Impaired  Vision Exceptions: Wears glasses for reading (and driving)  Hearing: Exceptions to [facility]  Hearing Exceptions: Hard of hearing/hearing concerns;Bilateral hearing aid    Orientation  Overall Orientation Status: Within Functional Limits        Balance  Sitting Balance: Stand by assistance (unsupported seated EOB, unsupported seated on toilet, supported seated in recliner chair)  Standing Balance: Minimal assistance (CGA-min A throughout)    Functional Mobility  Functional - Mobility Device: Rolling Walker  Activity: To/from bathroom  Assist Level: Minimal assistance  Functional Mobility Comments: CGA-min A with use of RW; mod VCs for safety awareness/RW mngt; increased time/effort with complaint of dizziness and significant fatigue following minimal exertion, pt returned to seated in recliner chair and vitals obtained /62, HR 78bpm, SPO2 96%    Toilet Transfers  Toilet - Technique: Ambulating (with use of RW)  Equipment Used: Standard toilet  Toilet Transfer: Contact guard assistance     ADL  Feeding: Minimal assistance; Increased time to complete;Bringing food to mouth assist;Scoop assist;Beverage management  Grooming: Minimal assistance; Increased time to complete;Verbal cueing (standing sink side with significant BUE propping on counter top to perform hand hygiene)  UE Bathing: Moderate assistance; Increased time to complete;Verbal cueing  LE Bathing: Maximum assistance;Verbal cueing; Increased time to complete  UE Dressing: Moderate assistance;Verbal cueing; Increased time to complete  LE Dressing: Verbal cueing; Increased time to complete;Maximum assistance (max A to thread underpants/pants, mod A to pull up underpants/pants over bottom while standing)  Toileting: Increased time to complete; Moderate assistance (mod A for LB clothing mngt, min A for pericare, CGA for toilet transfer)   Comments: Pt with significant limitations to BUE ROM/strength, decreased activity, and decreased dynamic sitting/standing balance throughout ADL performance requiring above levels of assistance to engage in ADL tasks. Tone RUE  RUE Tone: Normotonic  Tone LUE  LUE Tone: Normotonic  Coordination  Movements Are Fluid And Coordinated: No  Coordination and Movement description: Fine motor impairments;Gross motor impairments;Tremors;Decreased speed;Decreased accuracy; Right UE;Left UE        Bed mobility  Supine to Sit: Minimal assistance  Sit to Supine:  (pt retired up to chair)  Scooting: Minimal assistance  Comment: HOB flattened, use of R side bed rail, pt unable to assist with RUE during bed mobility tasks; increased time/effort to perform; mod VCs for initiation/sequencing     Transfers  Sit to stand: Contact guard assistance  Stand to sit: Contact guard assistance  Transfer Comments: Increased time/effort to perform; mod VCs for proper hand placement/sequencing/safety awareness with use of RW however pt resistive to therapist recommendations for hand placement and continues to pull up on RW.         Cognition  Overall Cognitive Status: Exceptions  Safety Judgement: Decreased awareness of need for assistance;Decreased awareness of need for safety  Problem Solving: Assistance required to correct errors made;Decreased awareness of errors;Assistance required to identify errors made  Insights: Decreased awareness of deficits           Sensation  Overall Sensation Status: WFL (Pt denies any numbness/tingling)        LUE AROM (degrees)  LUE AROM : WFL (decreased at shoulder ~0-90*, WFL at elbow/wrist/hand)  RUE AROM (degrees)  RUE General AROM: Pt with significant limitations to RUE ROM due to pain/edema, pain mostly to R shoulder/wrist, swelling to R wrist/hand. Very limited wrist flexion/extension, very limited hand grasp/release or digit ROM. Elbow WFL. Shoulder limited both actively and passively ~0-30*. LUE Strength  Gross LUE Strength:  (Shoulder grossly 3/5; elbow/wrist/hand grossly 4-/5)  L Hand General: 4-/5  RUE Strength  RUE Strength Comment: Unable to perform any strength assessment to RUE secondary to pt's high pain level.          Plan   Plan  Times per week: 5-6x/wk  Times per day: Daily  Current Treatment Recommendations: Balance Training, Functional Mobility Training, Endurance Training, Safety Education & Training, Self-Care / ADL, Patient/Caregiver Education & Training, Equipment Evaluation, Education, & procurement, Home Management Training, Strengthening, ROM, Cognitive Reorientation      AM-PAC Score  AM-[facility] Inpatient Daily Activity Raw Score: 14 (10/21/21 0926)  AM-PAC Inpatient ADL T-Scale Score : 33.39 (10/21/21 0926)  ADL Inpatient CMS 0-100% Score: 59.67 (10/21/21 0926)  ADL Inpatient CMS G-Code Modifier : CK (10/21/21 0926)    Goals  Short term goals  Time Frame for Short term goals: 14 visits  Short term goal 1: Pt will perform ADL tasks with mod IND using AE/DME PRN  Short term goal 2: Pt will perform functional transfers/functional mobility with mod IND using least restrictive AD  Short term goal 3: Pt will demo 10+ minutes standing tolerance with use of least restrictive AD for increased participation in ADL tasks  Short term goal 4: Pt will independently demo good safety awareness during engagement in ADL tasks and functional transfers/functional mobility       Therapy Time   Individual Concurrent Group Co-treatment   Time In 0828         Time Out 0925         Minutes 57         Timed Code Treatment Minutes: 23 Minutes       Olga Powell, OTR/L

## 2021-10-21 NOTE — PROGRESS NOTES
Physical Therapy    Facility/Department: [facility] SURG ICU  Initial Assessment    NAME: Xavi Holcomb  : 1940  MRN: [id number]    Date of Service: 10/21/2021   Chief Complaint   Patient presents with    Pain     pt c/o arthritic pain in bilateral shoulders and bilateral wrists. Patient unable to get up the whole night and day    Dizziness       Discharge Recommendations:  Patient would benefit from continued therapy after discharge   PT Equipment Recommendations  Equipment Needed: No  Other: Pt reports she has a RW at home and advised she would be safer with its use at this time. Assessment   Body structures, Functions, Activity limitations: Decreased functional mobility ; Decreased endurance;Decreased ROM; Decreased strength;Decreased balance; Increased pain;Decreased safe awareness  Assessment: Pt presents with gneralized weakness and an inability to use her (R) UE, decreased mobility and impaired function. Pt ambulated with RW using her (L) UE to primarily manage the walker, ambulating 10' x 2 only with CGA-Min assist.  Pt fatigues easily and noted to have SOB with standing to wash her hands and ambulate. Pt lives alone and at current status would be unsafe to return home without 24/7 assistance. Pt would benefit from further therapy upon discharge. Treatment Diagnosis: dec mobility  Prognosis: Good  Decision Making: Medium Complexity  PT Education: Goals;Transfer Training;PT Role;Functional Mobility Training;Plan of Care;General Safety;Gait Training  REQUIRES PT FOLLOW UP: Yes  Activity Tolerance  Activity Tolerance: Patient limited by fatigue;Patient limited by pain; Patient limited by endurance       Patient Diagnosis(es): The primary encounter diagnosis was Urinary tract infection with hematuria, site unspecified. Diagnoses of Chronic pain of both shoulders and Mass of right lung were also pertinent to this visit.      has a past medical history of Arthritis, Asthma, Back pain, Caffeine use, Carpal tunnel syndrome, Constipation, Dizziness, Gout, Hearing loss, History of blood transfusion, Hypertension, Obesity, Osteoarthritis, PONV (postoperative nausea and vomiting), Sleep apnea, Thyroid disease, Urinary incontinence, Urine incontinence, and Wears hearing aid. has a past surgical history that includes Tonsillectomy; Hysterectomy; knee surgery (Left); Foot surgery (Right); Cystoscopy; Cataract removal (Bilateral); joint replacement (Left, 2011); Knee arthroscopy (Left); Carpal tunnel release (Bilateral); Colonoscopy; Cataract removal with implant (Bilateral); and pr total knee arthroplasty (Right, 7/17/2018). Restrictions  Restrictions/Precautions  Restrictions/Precautions: Fall Risk, Up as Tolerated  Required Braces or Orthoses?: No  Implants present? : Metal implants  Position Activity Restriction  Other position/activity restrictions: Up with assist  Vision/Hearing  Vision: Impaired  Vision Exceptions: Wears glasses for reading  Hearing: Exceptions to [facility]  Hearing Exceptions: Bilateral hearing aid     Subjective  General  Patient assessed for rehabilitation services?: Yes  Family / Caregiver Present: No  Referring Practitioner: Brian  Referral Date : 10/21/21  Diagnosis: Acute cystitis with hematuria  Follows Commands: Within Functional Limits  Subjective  Subjective: Pt supine in bed and agreeable to therapy. Pt c/o right UE pain, ribcage pain, and left shoulder pain rated 8/10. Pain Screening  Patient Currently in Pain: Yes  Pain Assessment  Pain Assessment: 0-10  Pain Level: 8  Pain Type: Acute pain;Chronic pain  Pain Location: Rib cage; Wrist;Arm;Hand  Pain Orientation: Right;Left  Pain Descriptors: Aching  Vital Signs  Patient Currently in Pain: Yes       Orientation  Orientation  Overall Orientation Status: Within Normal Limits  Social/Functional History  Social/Functional History  Lives With: Alone  Type of Home: House (Duplex)  Home Layout: One level  Home Access: Stairs to enter with rails  Entrance Stairs - Number of Steps: 2  Entrance Stairs - Rails: Left  Bathroom Shower/Tub: Tub/Shower unit  Bathroom Toilet: Standard  Bathroom Equipment: Toilet raiser  Home Equipment: Rolling walker, Cane  Receives Help From: Family, Neighbor, Friend(s)  ADL Assistance: Independent  Homemaking Assistance: Independent  Homemaking Responsibilities: Yes  Ambulation Assistance: Independent  Transfer Assistance: Independent  Active : Yes  Mode of Transportation: Car  Occupation: Retired  Cognition   Cognition  Overall Cognitive Status: WNL    Objective          AROM RLE (degrees)  RLE AROM: WFL  AROM LLE (degrees)  LLE AROM : WFL  Strength RLE  Strength RLE: Exception  Comment: grossly 4/5  R Hip Flexion: 3-/5  Strength LLE  Strength LLE: Exception  Comment: grossly 4/5  L Hip Flexion: 3+/5     Sensation  Overall Sensation Status: WFL  Bed mobility  Supine to Sit: Minimal assistance  Scooting: Contact guard assistance  Comment: Head of bed flat but use of bed rail; Pt unable to assist with (R) UE for bed mobility. Transfers  Sit to Stand: Contact guard assistance;Minimal Assistance  Stand to sit: Contact guard assistance  Stand Pivot Transfers: Contact guard assistance  Comment: Transfers with RW. Pt required verbal cues for proper hand placement; increased time and effort due to pain. Ambulation  Ambulation?: Yes  Ambulation 1  Surface: level tile  Device: Rolling Walker  Assistance: Contact guard assistance  Quality of Gait: short step height and length, slow pace, pt able to lightly grasp walker handle with (R) hand and manage walker with (L) hand  Gait Deviations: Slow Mellissa;Decreased step length;Decreased step height  Distance: 10' x 2  Comments: Pt became very fatigued after toileting and unable to ambulate full distance to the chair; the chair was brought closer to the pt due to pt c/o fatigue and SOB. All vitals normal after sitting.   Stairs/Curb  Stairs?: No Balance  Posture: Fair  Sitting - Static: Good  Sitting - Dynamic: Good;-  Standing - Static: Fair  Standing - Dynamic: Fair;-  Comments: balance assessed with RW        Plan   Plan  Times per week: 5-6x/week  Times per day: Daily  Current Treatment Recommendations: Strengthening, Transfer Training, Endurance Training, Patient/Caregiver Education & Training, Gait Training, Balance Training, Functional Mobility Training, Stair training, Safety Education & Training  Safety Devices  Type of devices: Patient at risk for falls, Call light within reach, Chair alarm in place, Left in chair, Gait belt  Restraints  Initially in place: No    G-Code       OutComes Score                                                  AM-PAC Score  AM-PAC Inpatient Mobility Raw Score : 17 (10/21/21 [facility])  AM-PAC Inpatient T-Scale Score : 42.13 (10/21/21 [facility])  Mobility Inpatient CMS 0-100% Score: 50.57 (10/21/21 [facility])  Mobility Inpatient CMS G-Code Modifier : CK (10/21/21 [facility])          Goals  Short term goals  Time Frame for Short term goals: 14 visits  Short term goal 1: Pt to be Independent with bed mobility. Short term goal 2: Pt to tolerate 30 minutes of therapy activity to improve endurance for mobility. Short term goal 3: Pt to transfer Modified (I) without LOB. Short term goal 4: Pt to ambulate with appropriate device >90' Modified Independent without LOB for functional household ambulation. Short term goal 5: Pt to negotiate 2 steps with 1 rail Modified(I)-Supervision without LOB for entry into home.   Patient Goals   Patient goals : Return home       Therapy Time   Individual Concurrent Group Co-treatment   Time In 0829         Time Out 0925         Minutes 56         Timed Code [facility]: [facility]

## 2021-10-22 LAB
ANION GAP SERPL CALCULATED.3IONS-SCNC: 11 MMOL/L (ref 9–17)
BUN BLDV-MCNC: 17 MG/DL (ref 8–23)
BUN/CREAT BLD: ABNORMAL (ref 9–20)
CALCIUM SERPL-MCNC: 9.2 MG/DL (ref 8.6–10.4)
CHLORIDE BLD-SCNC: 102 MMOL/L (ref 98–107)
CO2: 23 MMOL/L (ref 20–31)
CREAT SERPL-MCNC: 0.48 MG/DL (ref 0.5–0.9)
CULTURE: ABNORMAL
CULTURE: ABNORMAL
EKG ATRIAL RATE: 64 BPM
EKG ATRIAL RATE: 75 BPM
EKG ATRIAL RATE: 77 BPM
EKG ATRIAL RATE: 83 BPM
EKG P AXIS: 36 DEGREES
EKG P AXIS: 78 DEGREES
EKG P AXIS: 80 DEGREES
EKG P-R INTERVAL: 146 MS
EKG P-R INTERVAL: 152 MS
EKG P-R INTERVAL: 154 MS
EKG Q-T INTERVAL: 378 MS
EKG Q-T INTERVAL: 382 MS
EKG Q-T INTERVAL: 412 MS
EKG Q-T INTERVAL: 430 MS
EKG QRS DURATION: 100 MS
EKG QRS DURATION: 94 MS
EKG QRS DURATION: 94 MS
EKG QRS DURATION: 98 MS
EKG QTC CALCULATION (BAZETT): 432 MS
EKG QTC CALCULATION (BAZETT): 443 MS
EKG QTC CALCULATION (BAZETT): 444 MS
EKG QTC CALCULATION (BAZETT): 460 MS
EKG R AXIS: -22 DEGREES
EKG R AXIS: -24 DEGREES
EKG R AXIS: -32 DEGREES
EKG R AXIS: -33 DEGREES
EKG T AXIS: 28 DEGREES
EKG T AXIS: 33 DEGREES
EKG T AXIS: 38 DEGREES
EKG T AXIS: 51 DEGREES
EKG VENTRICULAR RATE: 64 BPM
EKG VENTRICULAR RATE: 75 BPM
EKG VENTRICULAR RATE: 77 BPM
EKG VENTRICULAR RATE: 83 BPM
GFR AFRICAN AMERICAN: >60 ML/MIN
GFR NON-AFRICAN AMERICAN: >60 ML/MIN
GFR SERPL CREATININE-BSD FRML MDRD: ABNORMAL ML/MIN/{1.73_M2}
GFR SERPL CREATININE-BSD FRML MDRD: ABNORMAL ML/MIN/{1.73_M2}
GLUCOSE BLD-MCNC: 148 MG/DL (ref 70–99)
Lab: ABNORMAL
MAGNESIUM: 1.4 MG/DL (ref 1.6–2.6)
MAGNESIUM: 1.7 MG/DL (ref 1.6–2.6)
POTASSIUM SERPL-SCNC: 3.6 MMOL/L (ref 3.7–5.3)
POTASSIUM SERPL-SCNC: 4.5 MMOL/L (ref 3.7–5.3)
SODIUM BLD-SCNC: 136 MMOL/L (ref 135–144)
SPECIMEN DESCRIPTION: ABNORMAL

## 2021-10-22 PROCEDURE — 97116 GAIT TRAINING THERAPY: CPT

## 2021-10-22 PROCEDURE — 2580000003 HC RX 258: Performed by: NURSE PRACTITIONER

## 2021-10-22 PROCEDURE — APPSS45 APP SPLIT SHARED TIME 31-45 MINUTES: Performed by: NURSE PRACTITIONER

## 2021-10-22 PROCEDURE — 36415 COLL VENOUS BLD VENIPUNCTURE: CPT

## 2021-10-22 PROCEDURE — 83735 ASSAY OF MAGNESIUM: CPT

## 2021-10-22 PROCEDURE — 84132 ASSAY OF SERUM POTASSIUM: CPT

## 2021-10-22 PROCEDURE — 97535 SELF CARE MNGMENT TRAINING: CPT

## 2021-10-22 PROCEDURE — 6370000000 HC RX 637 (ALT 250 FOR IP): Performed by: NURSE PRACTITIONER

## 2021-10-22 PROCEDURE — 80048 BASIC METABOLIC PNL TOTAL CA: CPT

## 2021-10-22 PROCEDURE — 6360000002 HC RX W HCPCS: Performed by: NURSE PRACTITIONER

## 2021-10-22 PROCEDURE — 1210000000 HC MED SURG R&B

## 2021-10-22 PROCEDURE — 99232 SBSQ HOSP IP/OBS MODERATE 35: CPT | Performed by: INTERNAL MEDICINE

## 2021-10-22 PROCEDURE — 97110 THERAPEUTIC EXERCISES: CPT

## 2021-10-22 PROCEDURE — 94760 N-INVAS EAR/PLS OXIMETRY 1: CPT

## 2021-10-22 RX ORDER — TRIAMTERENE AND HYDROCHLOROTHIAZIDE 37.5; 25 MG/1; MG/1
1 TABLET ORAL DAILY
Status: DISCONTINUED | OUTPATIENT
Start: 2021-10-22 | End: 2021-10-25 | Stop reason: HOSPADM

## 2021-10-22 RX ORDER — POLYETHYLENE GLYCOL 3350 17 G/17G
17 POWDER, FOR SOLUTION ORAL ONCE
Status: COMPLETED | OUTPATIENT
Start: 2021-10-22 | End: 2021-10-22

## 2021-10-22 RX ORDER — 0.9 % SODIUM CHLORIDE 0.9 %
500 INTRAVENOUS SOLUTION INTRAVENOUS ONCE
Status: COMPLETED | OUTPATIENT
Start: 2021-10-22 | End: 2021-10-22

## 2021-10-22 RX ORDER — HYDROCODONE BITARTRATE AND ACETAMINOPHEN 5; 325 MG/1; MG/1
1 TABLET ORAL EVERY 6 HOURS PRN
Qty: 12 TABLET | Refills: 0 | Status: SHIPPED | OUTPATIENT
Start: 2021-10-22 | End: 2021-10-25

## 2021-10-22 RX ORDER — POTASSIUM CHLORIDE 20 MEQ/1
40 TABLET, EXTENDED RELEASE ORAL ONCE
Status: COMPLETED | OUTPATIENT
Start: 2021-10-22 | End: 2021-10-22

## 2021-10-22 RX ADMIN — ENOXAPARIN SODIUM 40 MG: 40 INJECTION SUBCUTANEOUS at 09:14

## 2021-10-22 RX ADMIN — CALCIUM CARBONATE 500 MG: 500 TABLET, CHEWABLE ORAL at 09:13

## 2021-10-22 RX ADMIN — ATORVASTATIN CALCIUM 40 MG: 40 TABLET, FILM COATED ORAL at 20:17

## 2021-10-22 RX ADMIN — Medication 400 UNITS: at 09:52

## 2021-10-22 RX ADMIN — HYDROCODONE BITARTRATE AND ACETAMINOPHEN 1 TABLET: 5; 325 TABLET ORAL at 10:45

## 2021-10-22 RX ADMIN — Medication 2000 UNITS: at 09:12

## 2021-10-22 RX ADMIN — HYDROCODONE BITARTRATE AND ACETAMINOPHEN 2 TABLET: 5; 325 TABLET ORAL at 14:54

## 2021-10-22 RX ADMIN — CETIRIZINE HYDROCHLORIDE 5 MG: 10 TABLET, FILM COATED ORAL at 09:14

## 2021-10-22 RX ADMIN — LISINOPRIL 2.5 MG: 2.5 TABLET ORAL at 09:18

## 2021-10-22 RX ADMIN — MAGNESIUM SULFATE HEPTAHYDRATE 1000 MG: 1 INJECTION, SOLUTION INTRAVENOUS at 12:49

## 2021-10-22 RX ADMIN — PANTOPRAZOLE SODIUM 40 MG: 40 TABLET, DELAYED RELEASE ORAL at 09:13

## 2021-10-22 RX ADMIN — VITAMIN C 1 TABLET: TAB at 09:13

## 2021-10-22 RX ADMIN — CEFTRIAXONE SODIUM 1000 MG: 1 INJECTION, POWDER, FOR SOLUTION INTRAMUSCULAR; INTRAVENOUS at 01:35

## 2021-10-22 RX ADMIN — ALLOPURINOL 300 MG: 300 TABLET ORAL at 09:14

## 2021-10-22 RX ADMIN — LEVOTHYROXINE SODIUM 88 MCG: 88 TABLET ORAL at 06:33

## 2021-10-22 RX ADMIN — POLYETHYLENE GLYCOL 3350 17 G: 17 POWDER, FOR SOLUTION ORAL at 10:45

## 2021-10-22 RX ADMIN — OXYCODONE HYDROCHLORIDE AND ACETAMINOPHEN 500 MG: 500 TABLET ORAL at 09:14

## 2021-10-22 RX ADMIN — MULTIPLE VITAMINS W/ MINERALS TAB 1 TABLET: TAB at 09:13

## 2021-10-22 RX ADMIN — HYDROXYCHLOROQUINE SULFATE 200 MG: 200 TABLET, FILM COATED ORAL at 20:17

## 2021-10-22 RX ADMIN — SODIUM CHLORIDE, PRESERVATIVE FREE 10 ML: 5 INJECTION INTRAVENOUS at 20:30

## 2021-10-22 RX ADMIN — MAGNESIUM SULFATE HEPTAHYDRATE 1000 MG: 1 INJECTION, SOLUTION INTRAVENOUS at 09:52

## 2021-10-22 RX ADMIN — SODIUM CHLORIDE 500 ML: 9 INJECTION, SOLUTION INTRAVENOUS at 20:22

## 2021-10-22 RX ADMIN — HYDROXYCHLOROQUINE SULFATE 200 MG: 200 TABLET, FILM COATED ORAL at 09:13

## 2021-10-22 RX ADMIN — ASPIRIN 81 MG: 81 TABLET, COATED ORAL at 09:18

## 2021-10-22 RX ADMIN — PREDNISONE 10 MG: 10 TABLET ORAL at 09:14

## 2021-10-22 RX ADMIN — POTASSIUM CHLORIDE 40 MEQ: 1500 TABLET, EXTENDED RELEASE ORAL at 09:52

## 2021-10-22 RX ADMIN — SODIUM CHLORIDE, PRESERVATIVE FREE 10 ML: 5 INJECTION INTRAVENOUS at 09:52

## 2021-10-22 RX ADMIN — CALCIUM CARBONATE 500 MG: 500 TABLET, CHEWABLE ORAL at 20:17

## 2021-10-22 ASSESSMENT — PAIN DESCRIPTION - ONSET
ONSET: ON-GOING
ONSET: ON-GOING

## 2021-10-22 ASSESSMENT — PAIN SCALES - GENERAL
PAINLEVEL_OUTOF10: 0
PAINLEVEL_OUTOF10: 7
PAINLEVEL_OUTOF10: 0
PAINLEVEL_OUTOF10: 7
PAINLEVEL_OUTOF10: 6
PAINLEVEL_OUTOF10: 0
PAINLEVEL_OUTOF10: 5
PAINLEVEL_OUTOF10: 0
PAINLEVEL_OUTOF10: 0

## 2021-10-22 ASSESSMENT — PAIN DESCRIPTION - DESCRIPTORS
DESCRIPTORS: ACHING;DISCOMFORT
DESCRIPTORS: DISCOMFORT

## 2021-10-22 ASSESSMENT — PAIN DESCRIPTION - ORIENTATION
ORIENTATION: RIGHT;LEFT
ORIENTATION: RIGHT

## 2021-10-22 ASSESSMENT — PAIN - FUNCTIONAL ASSESSMENT
PAIN_FUNCTIONAL_ASSESSMENT: PREVENTS OR INTERFERES SOME ACTIVE ACTIVITIES AND ADLS
PAIN_FUNCTIONAL_ASSESSMENT: PREVENTS OR INTERFERES SOME ACTIVE ACTIVITIES AND ADLS
PAIN_FUNCTIONAL_ASSESSMENT: PREVENTS OR INTERFERES WITH ALL ACTIVE AND SOME PASSIVE ACTIVITIES
PAIN_FUNCTIONAL_ASSESSMENT: PREVENTS OR INTERFERES SOME ACTIVE ACTIVITIES AND ADLS

## 2021-10-22 ASSESSMENT — PAIN DESCRIPTION - PROGRESSION
CLINICAL_PROGRESSION: GRADUALLY WORSENING
CLINICAL_PROGRESSION: NOT CHANGED
CLINICAL_PROGRESSION: GRADUALLY WORSENING

## 2021-10-22 ASSESSMENT — ENCOUNTER SYMPTOMS
COUGH: 1
SHORTNESS OF BREATH: 0
BLOOD IN STOOL: 0
ABDOMINAL PAIN: 0
CHEST TIGHTNESS: 0
WHEEZING: 0
CONSTIPATION: 0
DIARRHEA: 0
NAUSEA: 0
VOMITING: 0

## 2021-10-22 ASSESSMENT — PAIN DESCRIPTION - PAIN TYPE
TYPE: ACUTE PAIN
TYPE: ACUTE PAIN;CHRONIC PAIN
TYPE: ACUTE PAIN
TYPE: ACUTE PAIN

## 2021-10-22 ASSESSMENT — PAIN DESCRIPTION - LOCATION
LOCATION: SHOULDER
LOCATION: GENERALIZED
LOCATION: GENERALIZED

## 2021-10-22 NOTE — DISCHARGE SUMMARY
[facility]  Office: [address], , Sherrie Wyatt, DO, Lacey Khan, DO, [facility] Blood, DO, Gibson Stauffer MD, Areli Jane MD, Delfina Farmer MD, Sophia Beverly MD, Darien Otoole MD, Wen Dodson MD, Chikis Amador MD, Taty Huntley MD, Allan Ruiz, DO, Latisha Marie, DO, Parmjit Noel MD,  Dieudonne Vences DO, Scarlet Dasilva MD, Nicholas Varela MD, Marina Rivera MD, Estelita Stephen MD, Bk Gould MD, Noa Hair MD, Gloria Davis [facility], [facility], CNP, Jeyson Kuo, CNP, Tiago Cowan, CNS, Renae Lazar, CNP, Jerrell Shin, CNP, Dianna Arreaga, CNP, Bobby Agosto, CNP, Laurita Leo, CNP, Razia Roblero, CNP, Adelaida Ortiz PA-C, Alexandre Rocha, [facility], Kassie Garcia, CNP, Romulo Whitt, CNP, Linda Nagy, CNP, Satish Tay, CNP, Melissa Shah, CNP, Calixto Leach, CNP, Beatriz Sims, [address]   [address]    Discharge Summary     Patient ID: Jeramie Taylor  :     MRN: [id number]     ACCOUNT:  [de-identified]   Patient's PCP: Tia Woodall  Admit Date: 10/19/2021   Discharge Date: 10/25/2021  Length of Stay: 5  Code Status:  Prior  Admitting Physician: Tyler Seymour DO  Discharge Physician: COBY Elils CNP     Active Discharge Diagnoses:     Hospital Problem Lists:  Principal Problem:    UTI due to Klebsiella and Enterobacter  Active Problems:    Benign essential HTN    Shortness of breath    Gouty arthropathy    Hyponatremia    Urinary incontinence    Weakness    Acute cystitis without hematuria    Obesity (BMI 30-39. 9)    Mass of right lung    Malignant neoplasm of lung (HCC)    Chronic pain of both shoulders  Resolved Problems:    * No resolved hospital problems.  *      Admission Condition:  fair     Discharged Condition: stable    Hospital Stay:     Hospital Course: Ramses walters [de-identified] y.o. Non- / non  female who presents with Pain (pt c/o arthritic pain in bilateral shoulders and bilateral wrists. Patient unable to get up the whole night and day) and Dizziness   and is admitted to the hospital for the management of Acute cystitis without hematuria.     The patient presented to the er related to weakness she believes was related to her chronic arthritis.  Patient informed staff that she had to stop her normal arthritis medications for a few days last week for a needle biopsy last week at [facility] patient states that she was seen by her pulmonologist, Dr. Michelle Ivy, at [place] [address] last week for a lung biopsy. Shira Siddiqui states that they could not get clear biopsies during bronchoscopy and she was sent for a needle biopsy by IR.  The patient is still awaiting the results of her path report. Yesi Landry patient states that while she was admitted she did have x-rays of her shoulders because of chronic pain which showed osteoarthritis.  She also reports chronic dizziness.  The patient reports that she has almost daily dizziness as if the room is spinning.  She states that she sees a neuro otolaryngologist for this and has been talking to him about placing a cochlear implant to help control her dizziness.  During my assessment she also complains of shortness of breath with activity that has been going on for few weeks.  She states she does not notice the shortness of breath much at rest but with stairs or much activity she does become winded.      According to the ER note EMS was concerned the patient may have had A. fib in route.  2 EKGs done in our ER show a sinus rhythm with ectopy but no A. fib.  I then completed an EKG on the floor because her rhythm did seem irregular per telemetry and on auscultation but again it just shows a sinus arrhythmia.  Patient denies any history of heart arrhythmias or palpitations.     WBCs are 15.5 UA shows positive nitrates, large leukocyte esterase, too many to count WBCs many bacteria.  Patient's been placed on Rocephin IV pending culture results.  She denies urinary symptoms     I asked cardiology to review her case and decide wether or not there is a concern for afib/flutter.  Per the cardiology note they plan to order a 48-hour Holter monitor upon discharge to evaluate her rhythm.     Arthritic type pain to the right wrist and bilateral shoulders has improved some. Patient encouraged to follow-up with her rheumatologist, Dr. Oly Domínguez  as outpatient    ENTEROBACTER CLOACAE >838140 CFU/ML   KLEBSIELLA OXYTOCA >358399 CFU/ML   Planning to transition Rocephin to Cipro po.     Follow-up with PCP in 7 to 10 days    Significant therapeutic interventions: see above    Significant Diagnostic Studies:   Labs / Micro:  CBC:   Lab Results   Component Value Date    WBC 13.3 10/25/2021    RBC 2.30 10/25/2021    HGB 8.0 10/25/2021    HCT 23.7 10/25/2021    .9 10/25/2021    MCH 34.6 10/25/2021    MCHC 33.6 10/25/2021    RDW 15.4 10/25/2021     10/25/2021     BMP:    Lab Results   Component Value Date    GLUCOSE 86 10/25/2021     10/25/2021    K 4.1 10/25/2021    CL 97 10/25/2021    CO2 24 10/25/2021    ANIONGAP 11 10/25/2021    BUN 20 10/25/2021    CREATININE 0.58 10/25/2021    BUNCRER NOT REPORTED 10/25/2021    CALCIUM 9.6 10/25/2021    LABGLOM >60 10/25/2021    GFRAA >60 10/25/2021    GFR      10/25/2021    GFR NOT REPORTED 10/25/2021     HFP:    Lab Results   Component Value Date    PROT 5.2 10/21/2021     FLP:    Lab Results   Component Value Date    CHOL 122 10/21/2021    CHOL 145 08/08/2017    TRIG 56 10/21/2021    HDL 56 10/21/2021     U/A:    Lab Results   Component Value Date    COLORU Straw 10/19/2021    TURBIDITY Cloudy 10/19/2021    SPECGRAV 1.025 10/19/2021    HGBUR SMALL 10/19/2021    PHUR 6.0 10/19/2021    PROTEINU 1+ 10/19/2021    GLUCOSEU NEGATIVE 10/19/2021    KETUA SMALL 10/19/2021    BILIRUBINUR NEGATIVE 10/19/2021    UROBILINOGEN Normal 10/19/2021    NITRU POSITIVE 10/19/2021    LEUKOCYTESUR LARGE 10/19/2021     TSH: Lab Results   Component Value Date    TSH 3.52 08/08/2017        Radiology:  XR WRIST RIGHT (MIN 3 VIEWS)    Result Date: 10/19/2021  1. Severe proximal predominant degenerative changes at the carpus/wrist which can be seen with inflammatory arthropathy or gouty arthropathy. SLAC wrist deformity with widening of the scapholunate space and proximal migration of the capitate. 2. Mild osteoarthrosis at the [facility]st [facility]. 3. Moderate soft tissue swelling and edema about the wrist. 4. No acute fracture evident. 5. Severe impingement of the distal radioulnar joint. XR CHEST PORTABLE    Result Date: 10/19/2021  Masslike right basilar opacity could represent infectious process in the appropriate clinical setting. However, a chest CT should be considered for further evaluation     CT CHEST PULMONARY EMBOLISM W CONTRAST    Result Date: 10/20/2021  No evidence of pulmonary embolism. Large malignant neoplasm in the right lower lobe with extension to the ray and metastasis to the bilateral lungs, subcarinal lymph nodes, and possibly the left hilar lymph nodes. Trace left-sided layering pleural effusion. Consultations:    Consults:     Final Specialist Recommendations/Findings:   IP CONSULT TO RESPIRATORY CARE  IP CONSULT TO CARDIOLOGY      The patient was seen and examined on day of discharge and this discharge summary is in conjunction with any daily progress note from day of discharge. Discharge plan:     Disposition: To a non-[facility] facility    Physician Follow Up:     [address]in [address] [place] [address] follow-up in 7 to 10 days    Payal Bhatt, [facility].  [phone]      hospital follow up 1-2 weeks (holter monitor)    [facility]  [address]  [phone]      Nursing to arrange for Holter monitor replaced early next week      Requiring Further Evaluation/Follow Up POST HOSPITALIZATION/Incidental Findings: Follow-up with pulmonologist related to lung biopsies. Follow-up in 1 to 2 weeks with cardiology related to Holter monitor to evaluate rhythm    Diet: cardiac diet and low fat, low cholesterol diet    Activity: As tolerated    Instructions to Patient:     Follow up with PCP in one week  Take medications as instructed  Call 911 or return to the ER if you experience a recurrence of your symptoms or start having fever, chills, chest pain or shortness of breath. Discharge Medications:      Medication List      START taking these medications    ciprofloxacin 500 MG tablet  Commonly known as: CIPRO  Take 1 tablet by mouth 2 times daily for 5 days     guaiFENesin 600 MG extended release tablet  Commonly known as: MUCINEX  Take 1 tablet by mouth 2 times daily for 7 days     HYDROcodone-acetaminophen 5-325 MG per tablet  Commonly known as: Norco  Take 1 tablet by mouth every 6 hours as needed for Pain for up to 3 days. Intended supply: 3 days.  Take lowest dose possible to manage pain     lactobacillus capsule  Take 1 capsule by mouth 2 times daily (with meals) for 7 days     triamterene-hydroCHLOROthiazide 37.5-25 MG per tablet  Commonly known as: MAXZIDE-25  Take 1 tablet by mouth daily        CONTINUE taking these medications    allopurinol 300 MG tablet  Commonly known as: ZYLOPRIM     aspirin 81 MG chewable tablet     b complex vitamins capsule     budesonide-formoterol 160-4.5 MCG/ACT Aero  Commonly known as: SYMBICORT     calcium carbonate 500 MG Tabs tablet  Commonly known as: OSCAL     cetirizine 10 MG tablet  Commonly known as: ZYRTEC     Cinnamon 500 MG Caps     CoQ10 100 MG Caps     diphenhydrAMINE 25 MG capsule  Commonly known as: BENADRYL     FISH OIL PO     FOLIC ACID PO     Glucosamine 1500 Complex Caps     hydrocortisone 2.5 % cream     levothyroxine 88 MCG tablet  Commonly known as: SYNTHROID     lisinopril 10 MG tablet  Commonly known as: PRINIVIL;ZESTRIL Melatonin 5 MG Caps     metFORMIN 500 MG tablet  Commonly known as: GLUCOPHAGE     methotrexate 2.5 MG chemo tablet  Commonly known as: RHEUMATREX     Niacinamide 500 MG Tbcr     omeprazole 20 MG delayed release capsule  Commonly known as: PRILOSEC     ondansetron 4 MG disintegrating tablet  Commonly known as: Zofran ODT  Take 1 tablet by mouth every 8 hours as needed for Nausea     Plaquenil 200 MG tablet  Generic drug: hydroxychloroquine     polyethylene glycol 17 g packet  Commonly known as: GLYCOLAX     predniSONE 5 MG tablet  Commonly known as: DELTASONE     Resveratrol 100 MG Caps     therapeutic multivitamin-minerals tablet     TURMERIC PO     Ventolin  (90 Base) MCG/ACT inhaler  Generic drug: albuterol sulfate HFA     VITAMIN B12 PO     vitamin C 500 MG tablet  Commonly known as: ASCORBIC ACID     vitamin D 50 MCG (2000 UT) Caps capsule     VITAMIN D PO     vitamin E 400 UNIT capsule        STOP taking these medications    furosemide 20 MG tablet  Commonly known as: LASIX     NONFORMULARY     oxybutynin 15 MG extended release tablet  Commonly known as: DITROPAN XL     traMADol 50 MG tablet  Commonly known as: ULTRAM           Where to Get Your Medications      These medications were sent to [facility], [address] [place] [address] [phone]  [address] [place] [id number]    Phone: [phone]   · ciprofloxacin 500 MG tablet     You can get these medications from any pharmacy    Bring a paper prescription for each of these medications  · HYDROcodone-acetaminophen 5-325 MG per tablet     Information about where to get these medications is not yet available    Ask your nurse or doctor about these medications  · guaiFENesin 600 MG extended release tablet  · lactobacillus capsule  · triamterene-hydroCHLOROthiazide 37.5-25 MG per tablet         No discharge procedures on file.     Time Spent on discharge is  36 min  in patient examination, evaluation, counseling as well as medication reconciliation, prescriptions for required medications, discharge plan and follow up. Electronically signed by   COBY Morataya CNP  10/25/2021  6:29 PM      Thank you Dr. Stacy Jordan for the opportunity to be involved in this patient's care.

## 2021-10-22 NOTE — FLOWSHEET NOTE
Situation:  Writer provided a follow up visit with Ms. Ambrosio. Assessment:  Ms. Cristiane Medina was sitting in the bedside chair. She shared about her discharge plan and the assistance her daughter would be providing her. She acknowledged come concerns. She talked about the plans she is rescheduling with her Yars[employer]ism. RN entered and shared that Patient may not go to the facility today. Pt voiced some disappointment. Patient's phone rang, and Patient answered, stating that it was her doctor. Writer returned later. Patient shared what she learned about the biopsy results from her doctor. She acknowledged her feelings and how this was not in her plans. She accessed her sense of humor as she spoke of her own preferences. She agreed that she does not need more stress. She was receptive to prayer and [employer]. Intervention:  Writer provided supportive presence and explored Pt's coping and needs; inquired about Pt's sources of support and strength; offered empathy and words of support; prayed for Pt and gave her Holy Communion. Outcome:  Ms. Cristiane Medina processed her thoughts and feelings about her test results. She received [employer] and prayer. The doctor and Nurse practitioner arrived as Pt received Holy Communion. Plan:  Writer will be available for follow up support during Patient's admission. 10/22/21 1503   Encounter Summary   Services provided to: Patient   Referral/Consult From: [facility]; Children;Family members   Complexity of Encounter Moderate   Length of Encounter 30 minutes   Spiritual Assessment Completed Yes   Routine   Type Follow up   Spiritual/Synagogue   Type Spiritual support   Assessment Approachable; Anticipatory grief   Intervention Active listening;Explored feelings, thoughts, concerns;Explored coping resources; Facilitated forgiveness; Discussed illness/injury and it's impact;Sustaining presence/ Ministry of presence;Prayer;Communion;Discussed meaning/purpose;Discussed belief system/Sabianism practices/delbert   Outcome Expressed gratitude;Expressed feelings/needs/concerns;Engaged in conversation;Coping;Receptive; Hopeful;Encouraged     Electronically signed by Megan Hays, Oncology Outpatient [facility], [phone] [facility]  10/22/2021  3:14 PM

## 2021-10-22 NOTE — PROGRESS NOTES
Occupational Therapy  Facility/Department: [facility] ICU  Daily Treatment Note  NAME: Jr Spivey  : 1940  MRN: [id number]    Date of Service: 10/22/2021    Discharge Recommendations:  Patient would benefit from continued therapy after discharge     OT Equipment Recommendations  Equipment Needed: Yes  Mobility Devices: ADL Assistive Devices  ADL Assistive Devices: Transfer Tub Bench    Assessment   Performance deficits / Impairments: Decreased functional mobility ; Decreased ADL status; Decreased ROM; Decreased strength;Decreased safe awareness;Decreased cognition;Decreased endurance;Decreased balance;Decreased high-level IADLs;Decreased fine motor control;Decreased coordination  Comments: Pt unsafe to return to prior living arrangements at this time based on pt's current functional ability as pt requiring CGA-mod A during engagement in functional transfers/functional mobility and min A-max A during engagement in ADL tasks. Pt with significant BUE ROM/strength deficits and decreased activity tolerance limiting pt's ability to engage and complete self care tasks. Pt to benefit from continued therapy services while hospitalized and at discharge to maximize pt's safety and independence in performing functional tasks. 24hr assistance recommended at discharge. Prognosis: Good;Fair  Decision Making: Medium Complexity  OT Education: Plan of Care;OT Role;ADL Adaptive Strategies;Precautions;Transfer Training;Equipment (Activity Promotion, Safety Awareness/Fall Prevention, Safety with Transfers/RW Mngt, Compensatory ADL Techniques, Use of Built-Up Handles, ADL/IADL/Home Safety; fair return)  REQUIRES OT FOLLOW UP: Yes  Activity Tolerance  Activity Tolerance: Patient limited by pain; Patient limited by fatigue  Safety Devices  Safety Devices in place: Yes  Type of devices: Call light within reach;Nurse notified; Left in chair;Chair alarm in place  Restraints  Initially in place: No         Patient Diagnosis(es): The primary encounter diagnosis was Urinary tract infection with hematuria, site unspecified. Diagnoses of Chronic pain of both shoulders, Mass of right lung, DDD (degenerative disc disease), lumbar, Disorder of bone and articular cartilage, Gouty arthropathy, Acquired spondylolisthesis, and Lumbar stenosis with neurogenic claudication were also pertinent to this visit. has a past medical history of Arthritis, Asthma, Back pain, Caffeine use, Carpal tunnel syndrome, Constipation, Dizziness, Gout, Hearing loss, History of blood transfusion, Hypertension, Obesity, Osteoarthritis, PONV (postoperative nausea and vomiting), Sleep apnea, Thyroid disease, Urinary incontinence, Urine incontinence, and Wears hearing aid. has a past surgical history that includes Tonsillectomy; Hysterectomy; knee surgery (Left); Foot surgery (Right); Cystoscopy; Cataract removal (Bilateral); joint replacement (Left, 2011); Knee arthroscopy (Left); Carpal tunnel release (Bilateral); Colonoscopy; Cataract removal with implant (Bilateral); and pr total knee arthroplasty (Right, 7/17/2018). Restrictions  Restrictions/Precautions  Restrictions/Precautions: Fall Risk, Up as Tolerated  Required Braces or Orthoses?: No  Implants present? : Metal implants  Position Activity Restriction  Other position/activity restrictions: Up with assist     Subjective   General  Patient assessed for rehabilitation services?: Yes  Family / Caregiver Present: No  General Comment  Comments: RN ok'd for therapy this AM. Pt agreeable to participate in session and pleasant/cooperative throughout.   Pain Assessment  Pain Assessment: 0-10  Pain Level: 7  Pain Type: Acute pain;Chronic pain  Pain Location: Shoulder;Wrist;Chest  Pain Orientation: Right;Left  Pain Descriptors: Aching;Discomfort  Clinical Progression: Gradually worsening  Functional Pain Assessment: Prevents or interferes with all active and some passive activities  Non-Pharmaceutical Pain Intervention(s): Ambulation/Increased Activity; Distraction;Repositioned  Response to Pain Intervention: Patient Satisfied  Vital Signs  Patient Currently in Pain: Yes     Orientation  Orientation  Overall Orientation Status: Within Functional Limits     Objective    ADL  Grooming: Verbal cueing; Increased time to complete;Minimal assistance (seated sink side for oral care on shower chair with back, standing sink side with significant BUE propping on counter top to perform hand hygiene; seated at edge of recliner chair to perform facial hygiene)  UE Bathing: Increased time to complete; Moderate assistance;Verbal cueing (seated at edge of recliner chair to perform UB spongebathing/drying- setup of basin/washcloths/soap, assistance to ring water out of washcloths and use pump dispenser for soap, assistance to wash back, LUE and underarms due to RUE decreased ROM/strength)  LE Bathing: Verbal cueing; Increased time to complete;Maximum assistance (seated at edge of recliner chair and standing with use of RW to perform LB spongebathing/drying- setup of basin/washcloths/soap, assistance to ring water out of washcloths and use pump dispenser for soap; max A for bottom and BLE distal to knees)  UE Dressing: Verbal cueing; Increased time to complete; Moderate assistance (seated at edge of recliner chair to do/don hospital gown)  LE Dressing: Verbal cueing; Increased time to complete;Maximum assistance (seated to thread pull-up brief and pants with max A, standing to pull up brief and pants over bottom with mod A)  Toileting: Increased time to complete; Moderate assistance (pt requires mod A for brief mngt, bottom hygiene, and toilet transfer; pt able to perform pericare with min A)  Comments: Pt with limited activity tolerance, decreased BUE ROM/strength, poor static standing balance and dynamic sitting/standing balance, and decreased safety awareness/cognition impairing pt's ability to complete ADL tasks. Balance  Sitting Balance: Stand by assistance (pt seated supported at sink side on shower chair with back with supervision for static sitting balance during grooming tasks; unsupported seated at edge of recliner chair during UB/LB bathing/dressing tasks with CGA required for sitting balance)  Standing Balance: Minimal assistance (CGA-min A with use of RW)  Standing Balance  Time: ~7-8 minutes total, ~1-2 minute bouts prior to requiring prolonged seated rest breaks  Activity: functional mobility to/from bathroom, toilet transfer/toileting tasks, standing intermittently during LB bathing/dressing tasks  Comment: CGA-min A with use of RW/grab bar or significant BUE propping on counter top for support; pt with complaint of fatigue and requires prolonged seated rest breaks between bouts of standing/mobility; pt required mod VCs for proper hand placement/sequencing/safety awareness and RW mngt; pt mildly unsteady with 2x LOB requiring assistance to correct    Functional Mobility  Functional - Mobility Device: Rolling Walker  Activity:  (from bathroom to chair)  Assist Level: Minimal assistance  Functional Mobility Comments: CGA-min A with use of RW; mod VCs for safety awareness/RW mngt; increased time/effort with complaint of dizziness and significant fatigue following minimal exertion, pt returned to seated in recliner chair and vitals obtained    Toilet Transfers  Toilet - Technique: Ambulating (with use of RW)  Equipment Used: Standard toilet (with use of L sided grab bar)  Toilet Transfer: Minimal assistance    Bed mobility  Supine to Sit:  (pt up to commode with RT at therapist arrival)  Sit to Supine:  (pt retired up to chair)  Scooting: Minimal assistance (assessed in chair)  Comment: Increased time/effort to perform scooting and repositioning in recliner chair     Transfers  Sit to stand:  Moderate assistance  Stand to sit: Minimal assistance  Transfer Comments: Increased time/effort to perform; mod VCs for proper hand placement/sequencing/safety awareness with use of RW however pt resistive to therapist recommendations for hand placement and continues to pull up on RW.             Cognition  Overall Cognitive Status: Exceptions  Arousal/Alertness: Delayed responses to stimuli  Attention Span: Attends with cues to redirect  Safety Judgement: Decreased awareness of need for assistance;Decreased awareness of need for safety  Problem Solving: Assistance required to correct errors made;Decreased awareness of errors;Assistance required to identify errors made  Insights: Decreased awareness of deficits  Initiation: Requires cues for some  Sequencing: Requires cues for some         Plan   Plan  Times per week: 5-6x/wk  Times per day: Daily  Current Treatment Recommendations: Balance Training, Functional Mobility Training, Endurance Training, Safety Education & Training, Self-Care / ADL, Patient/Caregiver Education & Training, Equipment Evaluation, Education, & procurement, Home Management Training, Strengthening, ROM, Cognitive Reorientation      AM-PAC Score  AM-[facility] Inpatient Daily Activity Raw Score: 14 (10/21/21 0926)  AM-PAC Inpatient ADL T-Scale Score : 33.39 (10/21/21 0926)  ADL Inpatient CMS 0-100% Score: 59.67 (10/21/21 0926)  ADL Inpatient CMS G-Code Modifier : CK (10/21/21 0926)    Goals  Short term goals  Time Frame for Short term goals: 14 visits  Short term goal 1: Pt will perform ADL tasks with mod IND using AE/DME PRN  Short term goal 2: Pt will perform functional transfers/functional mobility with mod IND using least restrictive AD  Short term goal 3: Pt will demo 10+ minutes standing tolerance with use of least restrictive AD for increased participation in ADL tasks  Short term goal 4: Pt will independently demo good safety awareness during engagement in ADL tasks and functional transfers/functional mobility       Therapy Time   Individual Concurrent Group Co-treatment   Time In [facility] 613         Time Out 6046 Minutes 55         Timed Code Treatment Minutes: 308 [facility], OTR/L

## 2021-10-22 NOTE — PROGRESS NOTES
[facility]  Office: [address], DO, Keylaloni Sykes, DO, Jenice Duane, DO, Jeffry Deluna Blood, DO, Abel Banda MD, Steve Kathleen MD, Mu Gonsales MD, Fina Negro MD, Vera Montanez MD, Sowmya Johnson MD, Satish Bravo MD, Ryder Aldrich MD, Justa Randle, DO, Eric Banks, DO, Allie Ortiz MD,  Karmen Edge, DO, Vikash Rabago MD, Emmanuel Enamorado MD, Naty Walker MD, Bernard Mason MD, Amee Ordoñez MD, Michelle Boudreaux MD, Cynthia Amaral, [facility], [facility] Hardy, CNP, Fredy Davis, CNP, Tanda Lesches, CNS, Bhargav Pelletier, CNP, Brandan Avina, CNP, Sadia Sánchez, CNP, Magi Reid, CNP, Diana Carreno, CNP, Delbert Aase, CNP, Primo Euceda PA-C, Kristine Watts, [facility], Laxmi Mccall, CNP, Ezequiel Burgos, CNP, Rox Moore, CNP, Chiquita Robert, CNP, Pavel Youssef, [facility], Queen Pollock, CNP, Jasmin Marie 1732    Progress Note    10/22/2021    7:43 AM    Name:   Jennifer Lake  MRN:     [id number]     Acct:      [de-identified]   Room:   [address] [place] [address] Day:  2  Admit Date:  10/19/2021  7:15 PM    PCP:   Marry Rice  Code Status:  Full Code    Subjective:     C/C:   Chief Complaint   Patient presents with    Pain     pt c/o arthritic pain in bilateral shoulders and bilateral wrists. Patient unable to get up the whole night and day    Dizziness     Interval History Status: improved. She denies sob or chest pain but she did have some coughing this morning. She would like some Miralax today  She is complaining her the right arm/wrist is hurting again. Her arm is up on pillow. Brief History:     Christelle Shock a [de-identified] y.o. Non- / non  female who presents with Pain (pt c/o arthritic pain in bilateral shoulders and bilateral wrists.  Patient unable to get up the whole night and day) and Dizziness   and is admitted to the hospital for the management of Acute cystitis without hematuria.     The patient presented to the er related to weakness she believes was related to her chronic arthritis.  Patient informed staff that she had to stop her normal arthritis medications for a few days last week for a needle biopsy last week at [facility] patient states that she was seen by her pulmonologist, Dr. Irasema Cutler, at [place] [address] last week for a lung biopsy. Marisol Ortiz states that they could not get clear biopsies during bronchoscopy and she was sent for a needle biopsy by IR.  The patient is still awaiting the results of her path report. [facility] patient states that while she was admitted she did have x-rays of her shoulders because of chronic pain which showed osteoarthritis.  She also reports chronic dizziness.  The patient reports that she has almost daily dizziness as if the room is spinning.  She states that she sees a neuro otolaryngologist for this and has been talking to him about placing a cochlear implant to help control her dizziness.  During my assessment she also complains of shortness of breath with activity that has been going on for few weeks.  She states she does not notice the shortness of breath much at rest but with stairs or much activity she does become winded.      According to the ER note EMS was concerned the patient may have had A. fib in route.  2 EKGs done in our ER show a sinus rhythm with ectopy but no A. fib.  I then completed an EKG on the floor because her rhythm did seem irregular per telemetry and on auscultation but again it just shows a sinus arrhythmia.  Patient denies any history of heart arrhythmias or palpitations.     During my assessment today she does complain of pain to her shoulders bilaterally and to her wrist bilaterally right greater than left with some improvement in her pain since admission.      Troponins resulted at 20, 26 and then 26.  States she may had a little bit of left chest pain but it felt more like it was shoulder related.  She denies nausea, vomiting, or diaphoresis.  She does report some shortness of breath but she has had that for a few weeks.  WBCs are 15.5 UA shows positive nitrates, large leukocyte esterase, too many to count WBCs many bacteria.  Patient's been placed on Rocephin IV pending culture results.  She denies urinary symptoms     I asked cardiology to review her case and decide wether or not there is a concern for afib/flutter. Per the cardiology note they plan to order a 48-hour Holter monitor upon discharge to evaluate her rhythm.     I spoke with the patient at length about possibly going to a facility at discharge. She is pretty adamant that she does not want to go anywhere related to Covid concerns. I explained to her the concerns of her getting home and not being able to help herself much as far as getting food prepared and her ADLs. She did ultimately agreed to let the  to look into [place] but she feels like she wants to probably go home. She would like to speak to her daughter in little bit. Planning to discharge once arrangements are made     Planning to transition Rocephin to Ceftin p.o. Review of Systems:     Review of Systems   Constitutional: Negative for chills, diaphoresis and fever. Didn't sleep very well   HENT: Negative for congestion. Eyes: Negative for visual disturbance. Respiratory: Positive for cough (white mucous ). Negative for chest tightness, shortness of breath and wheezing. Cardiovascular: Negative for chest pain, palpitations and leg swelling. Gastrointestinal: Negative for abdominal pain, blood in stool, constipation, diarrhea, nausea and vomiting. Genitourinary: Negative for difficulty urinating. Musculoskeletal: Positive for myalgias (right arm ache). Neurological: Negative for dizziness, weakness (generalized weakness), light-headedness, numbness and headaches. All other systems reviewed and are negative. Medications: Allergies:     Allergies Allergen Reactions    Povidone-Iodine Rash    Hydrochlorothiazide     Meperidine Itching    Naproxen Hives       Current Meds:   Scheduled Meds:    potassium chloride  40 mEq Oral Once    Vitamin E  400 Units Oral Daily    lisinopril  2.5 mg Oral Daily    aspirin  81 mg Oral Q3 Days    albuterol sulfate HFA  2 puff Inhalation BID    budesonide-formoterol  2 puff Inhalation BID    cefTRIAXone  1,000 mg IntraVENous Q24H    allopurinol  300 mg Oral Daily    diphenhydrAMINE  25 mg Oral Nightly    oxybutynin  15 mg Oral Daily    cetirizine  5 mg Oral Daily    levothyroxine  88 mcg Oral Daily    pantoprazole  40 mg Oral QAM AC    [Held by provider] furosemide  20 mg Oral Daily    vitamin C  500 mg Oral Daily    Vitamin D  2 tablet Oral Daily    calcium carbonate  500 mg Oral BID    therapeutic multivitamin-minerals  1 tablet Oral Daily    hydroxychloroquine  200 mg Oral BID    vitamin B and C  1 tablet Oral Daily    [Held by provider] metFORMIN  500 mg Oral BID WC    sodium chloride flush  5-40 mL IntraVENous 2 times per day    enoxaparin  40 mg SubCUTAneous Daily    atorvastatin  40 mg Oral Nightly    methotrexate  15 mg Oral Weekly    predniSONE  5 mg Oral Once    predniSONE  10 mg Oral Daily     Continuous Infusions:    sodium chloride       PRN Meds: albuterol, polyethylene glycol, ondansetron, meclizine, diazePAM, sodium chloride flush, sodium chloride, acetaminophen **OR** acetaminophen, magnesium hydroxide, potassium chloride **OR** potassium alternative oral replacement **OR** potassium chloride, potassium chloride, magnesium sulfate, nitroGLYCERIN, HYDROcodone 5 mg - acetaminophen **OR** HYDROcodone 5 mg - acetaminophen, sodium chloride flush    Data:     Past Medical History:   has a past medical history of Arthritis, Asthma, Back pain, Caffeine use, Carpal tunnel syndrome, Constipation, Dizziness, Gout, Hearing loss, History of blood transfusion, Hypertension, Obesity, Osteoarthritis, PONV (postoperative nausea and vomiting), Sleep apnea, Thyroid disease, Urinary incontinence, Urine incontinence, and Wears hearing aid. Social History:   reports that she quit smoking about 46 years ago. She has a 4.25 pack-year smoking history. She has never used smokeless tobacco. She reports current alcohol use. She reports that she does not use drugs. Family History:   Family History   Problem Relation Age of Onset    Heart Failure Mother     Heart Disease Father        Vitals:  /63   Pulse 80   Temp 98.3 °F (36.8 °C) (Axillary)   Resp 17   Ht 5' 3\" (1.6 m)   Wt 223 lb 1.7 oz (101.2 kg)   SpO2 95%   BMI 39.52 kg/m²   Temp (24hrs), Av.5 °F (36.9 °C), Min:97.9 °F (36.6 °C), Max:99.1 °F (37.3 °C)    Recent Labs     10/21/21  0810   POCGLU 88       I/O (24Hr):     Intake/Output Summary (Last 24 hours) at 10/22/2021 0743  Last data filed at 10/22/2021 2227  Gross per 24 hour   Intake 300 ml   Output 400 ml   Net -100 ml       Labs:  Hematology:  Recent Labs     10/19/21  2017 10/20/21  1115 10/21/21  0408   WBC 15.5*  --  13.2*   RBC 3.05*  --  2.71*   HGB 10.8*  --  9.4*   HCT 31.9*  --  28.5*   .7*  --  105.1*   MCH 35.4*  --  34.7*   MCHC 33.8  --  33.0   RDW 15.9*  --  15.6*     --  245   MPV 7.7  --  7.4   SEDRATE 55*  --   --    CRP  --  194.8* 197.1*   DDIMER 0.86  --   --      Chemistry:  Recent Labs     10/19/21  2017 10/19/21  2017 10/20/21  0243 10/20/21  1115 10/20/21  2022 10/21/21  0408 10/21/21  0408 10/21/21  1145 10/21/21  1837 10/22/21  0519   *  --   --   --   --  131*  --   --   --  136   K 4.0   < >  --   --   --  3.3*   < > 3.1* 3.8 3.6*   CL 91*  --   --   --   --  97*  --   --   --  102   CO2 25  --   --   --   --  26  --   --   --  23   GLUCOSE 122*  --   --   --   --  128*  --   --   --  148*   BUN 16  --   --   --   --  19  --   --   --  17   CREATININE 0.65  --   --   --   --  0.65  --   --   --  0.48*   MG  --   --   --   -- --  1.1*  --  1.9  --  1.4*   ANIONGAP 16  --   --   --   --  8*  --   --   --  11   LABGLOM >60  --   --   --   --  >60  --   --   --  >60   GFRAA >60  --   --   --   --  >60  --   --   --  >60   CALCIUM 10.1  --   --   --   --  9.6  --   --   --  9.2   PROBNP 84  --   --   --   --   --   --   --   --   --    TROPHS 20*  --    < > 26* 25* 25*  --   --   --   --     < > = values in this interval not displayed. Recent Labs     10/21/21  0408 10/21/21  0810   PROT 5.2*  --    LABALBU 2.9*  --    AST 53*  --    ALT 41*  --    ALKPHOS 105*  --    BILITOT 0.41  --    CHOL 122  --    HDL 56  --    LDLCHOLESTEROL 55  --    CHOLHDLRATIO 2.2  --    TRIG 56  --    VLDL NOT REPORTED  --    POCGLU  --  88     ABG:No results found for: POCPH, PHART, PH, POCPCO2, VKA7GAB, PCO2, POCPO2, PO2ART, PO2, POCHCO3, DHX0KQX, HCO3, NBEA, PBEA, BEART, BE, THGBART, THB, MPN2LKM, EQEX9QGG, P4ERIQIS, O2SAT, FIO2  Lab Results   Component Value Date/Time    SPECIAL NOT REPORTED 10/19/2021 09:30 PM     Lab Results   Component Value Date/Time    CULTURE (A) 10/19/2021 09:30 PM     LACTOSE FERMENTING GRAM NEGATIVE RODS >[id number] CFU/ML       Radiology:  XR WRIST RIGHT (MIN 3 VIEWS)    Result Date: 10/19/2021  1. Severe proximal predominant degenerative changes at the carpus/wrist which can be seen with inflammatory arthropathy or gouty arthropathy. SLAC wrist deformity with widening of the scapholunate space and proximal migration of the capitate. 2. Mild osteoarthrosis at the [facility]. 3. Moderate soft tissue swelling and edema about the wrist. 4. No acute fracture evident. 5. Severe impingement of the distal radioulnar joint. XR CHEST PORTABLE    Result Date: 10/19/2021  Masslike right basilar opacity could represent infectious process in the appropriate clinical setting.   However, a chest CT should be considered for further evaluation     CT CHEST PULMONARY EMBOLISM W CONTRAST    Result Date: 10/20/2021  No evidence of pulmonary embolism. Large malignant neoplasm in the right lower lobe with extension to the ray and metastasis to the bilateral lungs, subcarinal lymph nodes, and possibly the left hilar lymph nodes. Trace left-sided layering pleural effusion. Physical Examination:     Physical Exam  Vitals and nursing note reviewed. HENT:      Mouth/Throat:      Mouth: Mucous membranes are moist.   Eyes:      Extraocular Movements: Extraocular movements intact. Cardiovascular:      Rate and Rhythm: Normal rate and regular rhythm. Pulses: Normal pulses. Heart sounds: Normal heart sounds. Pulmonary:      Effort: Pulmonary effort is normal.      Breath sounds: Normal breath sounds. Abdominal:      General: Bowel sounds are normal.      Palpations: Abdomen is soft. Musculoskeletal:         General: Tenderness (right wrist) present. Cervical back: Neck supple. Right lower le+ Edema present. Left lower le+ Edema present. Skin:     General: Skin is warm and dry. Capillary Refill: Capillary refill takes less than 2 seconds. Neurological:      Mental Status: She is alert and oriented to person, place, and time. GCS: GCS eye subscore is 4. GCS verbal subscore is 5. GCS motor subscore is 6. Psychiatric:         Mood and Affect: Mood normal.         Assessment:     Hospital Problems         Last Modified POA    * (Principal) Acute cystitis without hematuria 10/20/2021 Yes    Benign essential HTN 10/20/2021 Yes    Shortness of breath 10/20/2021 Yes    Gouty arthropathy 10/20/2021 Yes    Hyponatremia 10/20/2021 Yes    Urinary incontinence 10/20/2021 Yes    Weakness 10/20/2021 Yes    Urinary tract infection with hematuria 10/20/2021 Yes    Obesity (BMI 30-39.9) 10/20/2021 Yes    Mass of right lung 10/20/2021 Yes          Plan:     Acute cystitis without hematuria  Rocephin transition to p.o. Ceftin for discharge    Hypertension  Continue home lisinopril and monitor for hypertension.   Resume lasix     Cardiology consulted to evaluate heart rhythm  Plan for Holter monitor and cardiology follow-up as outpatient     Hyponatremia  Improved     Weakness  PT/OT to evaluate and treat  Patient would benefit from continued therapy. SNF placement recommended.   Case management assisting with discharge planning     Continue home Plaquenil, Synthroid, methotrexate, Ditropan, PPI, and multivitamin.  Hold Glucophage related to contrast.  Increase prednisone to 10 mg daily for now     Monitor and replace electrolytes as needed     DVT prophylaxis     Norco for pain     Patient encouraged to follow-up with her rheumatologist related to arthritis/joint swelling  She is encouraged to follow-up with Dr. Ramírez Clement  She is also encouraged to follow-up with her PCP in 7 to 10 days    Case management assisting with discharge [address] [facility] [address] [facility], APRN - CNP  10/22/2021  7:43 AM

## 2021-10-22 NOTE — PROGRESS NOTES
Physical Therapy  Facility/Department: [facility] SURG ICU  Daily Treatment Note  NAME: Rhina Mosley  : 1940  MRN: [id number]    Date of Service: 10/22/2021    Discharge Recommendations:  Patient would benefit from continued therapy after discharge   PT Equipment Recommendations  Equipment Needed: No  Other: Pt reports she has a RW at home and advised she would be safer with its use at this time. Assessment   Body structures, Functions, Activity limitations: Decreased functional mobility ; Decreased endurance;Decreased ROM; Decreased strength;Decreased balance; Increased pain;Decreased safe awareness  Assessment: Pt with improving tolerance to gait distance today but still unable to stand or ambulate without physical assistance. Pt would be unsafe to return to her prior living arrangements without strict / assistance secondary to her decreased balance. Pt will benefit from further therapy at discharge. Prognosis: Good  Patient Education: seated exercises  REQUIRES PT FOLLOW UP: Yes  Activity Tolerance  Activity Tolerance: Patient limited by fatigue;Patient limited by pain; Patient limited by endurance     Patient Diagnosis(es): The primary encounter diagnosis was Urinary tract infection with hematuria, site unspecified. Diagnoses of Chronic pain of both shoulders, Mass of right lung, DDD (degenerative disc disease), lumbar, Disorder of bone and articular cartilage, Gouty arthropathy, Acquired spondylolisthesis, Lumbar stenosis with neurogenic claudication, Lumbar disc herniation with radiculopathy, and Localized primary osteoarthritis of right hand were also pertinent to this visit.      has a past medical history of Arthritis, Asthma, Back pain, Caffeine use, Carpal tunnel syndrome, Constipation, Dizziness, Gout, Hearing loss, History of blood transfusion, Hypertension, Obesity, Osteoarthritis, PONV (postoperative nausea and vomiting), Sleep apnea, Thyroid disease, Urinary incontinence, Urine incontinence, and Wears hearing aid. has a past surgical history that includes Tonsillectomy; Hysterectomy; knee surgery (Left); Foot surgery (Right); Cystoscopy; Cataract removal (Bilateral); joint replacement (Left, 2011); Knee arthroscopy (Left); Carpal tunnel release (Bilateral); Colonoscopy; Cataract removal with implant (Bilateral); and pr total knee arthroplasty (Right, 7/17/2018). Restrictions  Restrictions/Precautions  Restrictions/Precautions: Fall Risk, Up as Tolerated  Required Braces or Orthoses?: No  Implants present? : Metal implants  Position Activity Restriction  Other position/activity restrictions: Up with assist  Subjective   General  Response To Previous Treatment: Patient with no complaints from previous session. Family / Caregiver Present: No  Subjective  Subjective: Pt seated in chair upon arrival and agreeable to therapy. Pt reports UE pain today. Pain Screening  Patient Currently in Pain: Yes  Pain Assessment  Pain Assessment: 0-10  Pain Level: 5  Pain Type: Acute pain  Pain Location: Shoulder  Pain Orientation: Right  Pain Descriptors: Discomfort  Functional Pain Assessment: Prevents or interferes some active activities and ADLs  Non-Pharmaceutical Pain Intervention(s): Ambulation/Increased Activity; Distraction;Repositioned  Response to Pain Intervention: Patient Satisfied  Vital Signs  Patient Currently in Pain: Yes            Cognition   Cognition  Overall Cognitive Status: Exceptions  Arousal/Alertness: Appropriate responses to stimuli  Following Commands:  Follows multistep commands with increased time  Attention Span: Attends with cues to redirect  Safety Judgement: Decreased awareness of need for assistance;Decreased awareness of need for safety  Problem Solving: Assistance required to correct errors made;Decreased awareness of errors;Assistance required to identify errors made  Insights: Decreased awareness of deficits  Initiation: Requires cues for some  Sequencing: Requires cues for some  Objective   Bed mobility  Scooting: Contact guard assistance  Comment: Scooting assessed in chair as pt was up to chair upon arrival and retired to chair at end of session  Transfers  Sit to Stand: Minimal Assistance  Stand to sit: Minimal Assistance  Comment: Cues for hand placement; increased time and effort  Ambulation  Ambulation?: Yes  Ambulation 1  Surface: level tile  Device: Rolling Walker  Assistance: Contact guard assistance  Quality of Gait: decreased step length, decreased gait speed, decreased endurance, mainly use of LUE to negotiate RW but does  walker with RUE  Gait Deviations: Slow Mellissa;Decreased step length;Decreased step height  Distance: 12ft, seated rest break, 35ft  Comments: Pt does fatigue quickly with ambulation. Stairs/Curb  Stairs?: No     Balance  Posture: Fair  Sitting - Static: Good  Sitting - Dynamic: Good;-  Standing - Static: Fair  Standing - Dynamic: Fair  Comments: standing balance assessed with RW              Seated LE exercise program: Long Arc Quads, hip abduction/adduction, heel/toe raises, and marches. Reps: 15 bilaterally              AM-PAC Score  AM-PAC Inpatient Mobility Raw Score : 17 (10/22/21 1458)  AM-PAC Inpatient T-Scale Score : 42.13 (10/22/21 1458)  Mobility Inpatient CMS 0-100% Score: 50.57 (10/22/21 1458)  Mobility Inpatient CMS G-Code Modifier : CK (10/22/21 1458)          Goals  Short term goals  Time Frame for Short term goals: 14 visits  Short term goal 1: Pt to be Independent with bed mobility. Short term goal 2: Pt to tolerate 30 minutes of therapy activity to improve endurance for mobility. Short term goal 3: Pt to transfer Modified (I) without LOB. Short term goal 4: Pt to ambulate with appropriate device >90' Modified Independent without LOB for functional household ambulation. Short term goal 5: Pt to negotiate 2 steps with 1 rail Modified(I)-Supervision without LOB for entry into home.   Patient Goals   Patient goals : Return home    Plan    Plan  Times per week: 5-6x/week  Times per day: Daily  Current Treatment Recommendations: Strengthening, Transfer Training, Endurance Training, Patient/Caregiver Education & Training, Gait Training, Balance Training, Functional Mobility Training, Stair training, Safety Education & Training  Safety Devices  Type of devices: Call light within reach, Left in chair, Gait belt, Nurse notified, Chair alarm in place  Restraints  Initially in place: No     Therapy Time   Individual Concurrent Group Co-treatment   Time In 1400         Time Out 1427         Minutes 27         Timed Code Treatment Minutes: [address] [facility],

## 2021-10-22 NOTE — PLAN OF CARE
Problem: Falls - Risk of:  Goal: Will remain free from falls  Description: Will remain free from falls  Outcome: Ongoing  Goal: Absence of physical injury  Description: Absence of physical injury  Outcome: Ongoing     Problem: Skin Integrity:  Goal: Will show no infection signs and symptoms  Description: Will show no infection signs and symptoms  Outcome: Ongoing  Goal: Absence of new skin breakdown  Description: Absence of new skin breakdown  Outcome: Ongoing     Problem: Pain:  Goal: Pain level will decrease  Description: Pain level will decrease  Outcome: Ongoing  Goal: Control of acute pain  Description: Control of acute pain  Outcome: Ongoing  Goal: Control of chronic pain  Description: Control of chronic pain  Outcome: Ongoing     Problem: Nutrition  Goal: Optimal nutrition therapy  Outcome: Ongoing     Problem: Musculor/Skeletal Functional Status  Goal: Highest potential functional level  Outcome: Ongoing  Goal: Absence of falls  Outcome: Ongoing

## 2021-10-22 NOTE — DISCHARGE INSTR - COC
Continuity of Care Form    Patient Name: Yoav Ivey   :    MRN:  [id number]    [facility] date:  10/19/2021  Discharge date: 10/22/2021  Code Status Order: Full Code   Advance Directives:      Admitting Physician:  Robbie Cary DO  PCP: Dieudonne Rodriguez [facility],  [address]    Discharging Nurse: Ludin Ridley Unit/Room#: 320/320-01  Discharging Unit Phone Number: [phone]    Emergency Contact:   Extended Emergency Contact Information  Primary Emergency Contact: Daniel, Irish [facility] [address] Todd Lassiter [address] Phone: [phone]  Mobile Phone: [phone]  Relation: Child  Secondary Emergency Contact: Daina [address] Phone: [phone]  Mobile Phone: [phone]  Relation: Child    Past Surgical History:  Past Surgical History:   Procedure Laterality Date    CARPAL TUNNEL RELEASE Bilateral     CATARACT REMOVAL Bilateral     CATARACT REMOVAL WITH IMPLANT Bilateral     COLONOSCOPY      CYSTOSCOPY      and dilation    FOOT SURGERY Right     HYSTERECTOMY      JOINT REPLACEMENT Left     TKA    KNEE ARTHROSCOPY Left     KNEE SURGERY Left     NE TOTAL KNEE ARTHROPLASTY Right 2018    KNEE TOTAL ARTHROPLASTY performed by Anselmo Ly MD at [facility]         Immunization History:   Immunization History   Administered Date(s) Administered    COVID-19, Haile Peter, PF, 30mcg/0.3mL 2021, 2021       Active Problems:  Patient Active Problem List   Diagnosis Code    Urgency of urination R39.15    Anemia D64.9    Apnea R06.81    Arthropathia M12.9    Benign essential HTN I10    Chronic renal impairment N18.9    DDD (degenerative disc disease), lumbar M51.36    Disorder of bone and articular cartilage M89.9, M94.9    Disorder of lipid metabolism E78.9    Edema R60.9    Shortness of breath R06.02    Enthesopathy of hip M76.899    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, 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  Gout M10.9    Gouty arthropathy M10.9    Hyponatremia E87.1    Hypoactive thyroid E03.9    Elevated fasting blood sugar R73.01    Inflammation of sacroiliac joint (HCC) M46.1    Insomnia G47.00    Iron deficiency anemia D50.9    Primary localized osteoarthritis M19.91    Arthritis, degenerative, localized, primary, hand M19.049    Lumbosacral neuritis M54.17    Increased MCV D75.89    Menopausal symptom N95.1    Obstructive apnea G47.33    Arthritis of knee, degenerative M17.10    Osteoporosis M81.0    Urinary incontinence R32    Varicose veins of lower extremity I83.90    Chronic low back pain M54.50, G89.29    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 9) E66.9    Mass of right lung R91.8       Isolation/Infection:   Isolation            No Isolation          Patient Infection Status       None to display            Nurse Assessment:  Last Vital Signs: BP (!) 147/65   Pulse 98   Temp 98.3 °F (36.8 °C) (Axillary)   Resp 18   Ht 5' 3\" (1.6 m)   Wt 223 lb 1.7 oz (101.2 kg)   SpO2 98%   BMI 39.52 kg/m²     Last documented pain score (0-10 scale): Pain Level: 0  Last Weight:   Wt Readings from Last 1 Encounters:   10/22/21 223 lb 1.7 oz (101.2 kg)     Mental Status:  oriented and alert    IV Access:  - None    Nursing Mobility/ADLs:  Walking   Assisted  Transfer  Assisted  Bathing  Assisted  Dressing  Assisted  Toileting  Assisted  Feeding  Independent  Med Admin  Assisted  Med Delivery   whole    Wound Care Documentation and Therapy:  Incision 07/17/18 Knee Right (Active)   Number of days: 4325        Elimination:  Continence:   · Bowel:  Yes  · Bladder: Yes  Urinary Catheter: None Colostomy/Ileostomy/Ileal Conduit: No       Date of Last BM: 10/22/2021    Intake/Output Summary (Last 24 hours) at 10/22/2021 1131  Last data filed at 10/22/2021 0952  Gross per 24 hour   Intake 300 ml   Output 600 ml   Net -300 ml     I/O last 3 completed shifts: In: 300 [P.O.:300]  Out: 400 [Urine:400]    Safety Concerns: At Risk for Falls    Impairments/Disabilities:      None    Nutrition Therapy:  Current Nutrition Therapy:   - Oral Diet:  General    Routes of Feeding: Oral  Liquids: No Restrictions  Daily Fluid Restriction: no  Last Modified Barium Swallow with Video (Video Swallowing Test): not done    Treatments at the Time of Hospital Discharge:   Respiratory Treatments: na  Oxygen Therapy:  is not on home oxygen therapy. Ventilator:    - No ventilator support    Rehab Therapies: Physical Therapy and Occupational Therapy  Weight Bearing Status/Restrictions: No weight bearing restirctions  Other Medical Equipment (for information only, NOT a DME order):  walker and bath bench  Other Treatments: na    Patient's personal belongings (please select all that are sent with patient):  Hearing aids    RN SIGNATURE:  Electronically signed by Leni Sharma RN on 10/22/21 at 11:32 AM EDT    CASE MANAGEMENT/SOCIAL WORK SECTION    Inpatient Status Date: 10/20/2021    Readmission Risk Assessment Score:  Readmission Risk              Risk of Unplanned Readmission:  25           Discharging to Facility/ Agency   · Name: Liza Hay  · Address[facility] [address] [facility] [address]  · Phone: [phone]      / signature: Electronically signed by Chana Resendiz RN on 10/22/21 at 4:43 PM EDT    PHYSICIAN SECTION    Prognosis: Guarded    Condition at Discharge: Stable    Rehab Potential (if transferring to Rehab):  Fair    Recommended Labs or Other Treatments After Discharge:   Antibiotics per C&S Results   Still awaiting pathology results from [facility]  The patient will

## 2021-10-23 LAB
ANION GAP SERPL CALCULATED.3IONS-SCNC: 10 MMOL/L (ref 9–17)
BUN BLDV-MCNC: 20 MG/DL (ref 8–23)
BUN/CREAT BLD: ABNORMAL (ref 9–20)
CALCIUM SERPL-MCNC: 9.2 MG/DL (ref 8.6–10.4)
CHLORIDE BLD-SCNC: 100 MMOL/L (ref 98–107)
CO2: 23 MMOL/L (ref 20–31)
CREAT SERPL-MCNC: 0.56 MG/DL (ref 0.5–0.9)
GFR AFRICAN AMERICAN: >60 ML/MIN
GFR NON-AFRICAN AMERICAN: >60 ML/MIN
GFR SERPL CREATININE-BSD FRML MDRD: ABNORMAL ML/MIN/{1.73_M2}
GFR SERPL CREATININE-BSD FRML MDRD: ABNORMAL ML/MIN/{1.73_M2}
GLUCOSE BLD-MCNC: 98 MG/DL (ref 70–99)
MAGNESIUM: 1.4 MG/DL (ref 1.6–2.6)
MAGNESIUM: 1.7 MG/DL (ref 1.6–2.6)
POTASSIUM SERPL-SCNC: 3.9 MMOL/L (ref 3.7–5.3)
SODIUM BLD-SCNC: 133 MMOL/L (ref 135–144)

## 2021-10-23 PROCEDURE — 97110 THERAPEUTIC EXERCISES: CPT

## 2021-10-23 PROCEDURE — 97535 SELF CARE MNGMENT TRAINING: CPT

## 2021-10-23 PROCEDURE — 1210000000 HC MED SURG R&B

## 2021-10-23 PROCEDURE — 80048 BASIC METABOLIC PNL TOTAL CA: CPT

## 2021-10-23 PROCEDURE — 99231 SBSQ HOSP IP/OBS SF/LOW 25: CPT | Performed by: INTERNAL MEDICINE

## 2021-10-23 PROCEDURE — 6360000002 HC RX W HCPCS: Performed by: NURSE PRACTITIONER

## 2021-10-23 PROCEDURE — 97116 GAIT TRAINING THERAPY: CPT

## 2021-10-23 PROCEDURE — 83735 ASSAY OF MAGNESIUM: CPT

## 2021-10-23 PROCEDURE — 2580000003 HC RX 258: Performed by: NURSE PRACTITIONER

## 2021-10-23 PROCEDURE — 94760 N-INVAS EAR/PLS OXIMETRY 1: CPT

## 2021-10-23 PROCEDURE — APPSS45 APP SPLIT SHARED TIME 31-45 MINUTES: Performed by: NURSE PRACTITIONER

## 2021-10-23 PROCEDURE — 36415 COLL VENOUS BLD VENIPUNCTURE: CPT

## 2021-10-23 PROCEDURE — 6370000000 HC RX 637 (ALT 250 FOR IP): Performed by: NURSE PRACTITIONER

## 2021-10-23 RX ORDER — CIPROFLOXACIN 250 MG/1
500 TABLET, FILM COATED ORAL EVERY 12 HOURS SCHEDULED
Status: DISCONTINUED | OUTPATIENT
Start: 2021-10-23 | End: 2021-10-25 | Stop reason: HOSPADM

## 2021-10-23 RX ORDER — CIPROFLOXACIN 500 MG/1
500 TABLET, FILM COATED ORAL 2 TIMES DAILY
Qty: 10 TABLET | Refills: 0 | Status: SHIPPED | OUTPATIENT
Start: 2021-10-23 | End: 2021-10-28

## 2021-10-23 RX ORDER — CEFUROXIME AXETIL 250 MG/1
250 TABLET ORAL 2 TIMES DAILY
Qty: 12 TABLET | Refills: 0 | DISCHARGE
Start: 2021-10-23 | End: 2021-10-23 | Stop reason: HOSPADM

## 2021-10-23 RX ORDER — MAGNESIUM SULFATE IN WATER 40 MG/ML
2000 INJECTION, SOLUTION INTRAVENOUS ONCE
Status: DISCONTINUED | OUTPATIENT
Start: 2021-10-23 | End: 2021-10-25 | Stop reason: HOSPADM

## 2021-10-23 RX ORDER — LACTOBACILLUS RHAMNOSUS GG 10B CELL
1 CAPSULE ORAL 2 TIMES DAILY WITH MEALS
Status: DISCONTINUED | OUTPATIENT
Start: 2021-10-23 | End: 2021-10-25 | Stop reason: HOSPADM

## 2021-10-23 RX ORDER — TRIAMTERENE AND HYDROCHLOROTHIAZIDE 37.5; 25 MG/1; MG/1
1 TABLET ORAL DAILY
Qty: 30 TABLET | Refills: 3 | Status: ON HOLD | DISCHARGE
Start: 2021-10-24 | End: 2021-11-15 | Stop reason: HOSPADM

## 2021-10-23 RX ADMIN — CEFTRIAXONE SODIUM 1000 MG: 1 INJECTION, POWDER, FOR SOLUTION INTRAMUSCULAR; INTRAVENOUS at 01:23

## 2021-10-23 RX ADMIN — CIPROFLOXACIN 500 MG: 250 TABLET, FILM COATED ORAL at 20:42

## 2021-10-23 RX ADMIN — LISINOPRIL 2.5 MG: 2.5 TABLET ORAL at 08:06

## 2021-10-23 RX ADMIN — TRIAMTERENE AND HYDROCHLOROTHIAZIDE 1 TABLET: 37.5; 25 TABLET ORAL at 08:07

## 2021-10-23 RX ADMIN — Medication 1 CAPSULE: at 17:04

## 2021-10-23 RX ADMIN — HYDROCODONE BITARTRATE AND ACETAMINOPHEN 1 TABLET: 5; 325 TABLET ORAL at 14:27

## 2021-10-23 RX ADMIN — Medication 2000 UNITS: at 08:06

## 2021-10-23 RX ADMIN — OXYBUTYNIN CHLORIDE 15 MG: 5 TABLET, EXTENDED RELEASE ORAL at 08:06

## 2021-10-23 RX ADMIN — MULTIPLE VITAMINS W/ MINERALS TAB 1 TABLET: TAB at 08:09

## 2021-10-23 RX ADMIN — HYDROCODONE BITARTRATE AND ACETAMINOPHEN 2 TABLET: 5; 325 TABLET ORAL at 03:23

## 2021-10-23 RX ADMIN — VITAMIN C 1 TABLET: TAB at 08:07

## 2021-10-23 RX ADMIN — METFORMIN HYDROCHLORIDE 500 MG: 500 TABLET ORAL at 08:06

## 2021-10-23 RX ADMIN — HYDROXYCHLOROQUINE SULFATE 200 MG: 200 TABLET, FILM COATED ORAL at 08:07

## 2021-10-23 RX ADMIN — SODIUM CHLORIDE, PRESERVATIVE FREE 10 ML: 5 INJECTION INTRAVENOUS at 21:00

## 2021-10-23 RX ADMIN — OXYCODONE HYDROCHLORIDE AND ACETAMINOPHEN 500 MG: 500 TABLET ORAL at 08:08

## 2021-10-23 RX ADMIN — CALCIUM CARBONATE 500 MG: 500 TABLET, CHEWABLE ORAL at 08:06

## 2021-10-23 RX ADMIN — MAGNESIUM SULFATE HEPTAHYDRATE 1000 MG: 1 INJECTION, SOLUTION INTRAVENOUS at 06:45

## 2021-10-23 RX ADMIN — ALLOPURINOL 300 MG: 300 TABLET ORAL at 08:06

## 2021-10-23 RX ADMIN — ENOXAPARIN SODIUM 40 MG: 40 INJECTION SUBCUTANEOUS at 08:09

## 2021-10-23 RX ADMIN — PREDNISONE 10 MG: 10 TABLET ORAL at 08:08

## 2021-10-23 RX ADMIN — HYDROXYCHLOROQUINE SULFATE 200 MG: 200 TABLET, FILM COATED ORAL at 20:42

## 2021-10-23 RX ADMIN — PANTOPRAZOLE SODIUM 40 MG: 40 TABLET, DELAYED RELEASE ORAL at 06:45

## 2021-10-23 RX ADMIN — CALCIUM CARBONATE 500 MG: 500 TABLET, CHEWABLE ORAL at 20:42

## 2021-10-23 RX ADMIN — POLYETHYLENE GLYCOL 3350 17 G: 17 POWDER, FOR SOLUTION ORAL at 08:06

## 2021-10-23 RX ADMIN — HYDROCODONE BITARTRATE AND ACETAMINOPHEN 2 TABLET: 5; 325 TABLET ORAL at 22:20

## 2021-10-23 RX ADMIN — CETIRIZINE HYDROCHLORIDE 5 MG: 10 TABLET, FILM COATED ORAL at 08:09

## 2021-10-23 RX ADMIN — MAGNESIUM SULFATE HEPTAHYDRATE 1000 MG: 1 INJECTION, SOLUTION INTRAVENOUS at 08:17

## 2021-10-23 RX ADMIN — METFORMIN HYDROCHLORIDE 500 MG: 500 TABLET ORAL at 17:04

## 2021-10-23 RX ADMIN — ATORVASTATIN CALCIUM 40 MG: 40 TABLET, FILM COATED ORAL at 20:42

## 2021-10-23 RX ADMIN — LEVOTHYROXINE SODIUM 88 MCG: 88 TABLET ORAL at 04:25

## 2021-10-23 ASSESSMENT — ENCOUNTER SYMPTOMS
NAUSEA: 0
VOMITING: 0
ABDOMINAL PAIN: 0
WHEEZING: 0
DIARRHEA: 0
BLOOD IN STOOL: 0
SHORTNESS OF BREATH: 0
CHEST TIGHTNESS: 0
CONSTIPATION: 0
COUGH: 0

## 2021-10-23 ASSESSMENT — PAIN DESCRIPTION - PAIN TYPE
TYPE: ACUTE PAIN
TYPE: ACUTE PAIN
TYPE: ACUTE PAIN;CHRONIC PAIN
TYPE: ACUTE PAIN

## 2021-10-23 ASSESSMENT — PAIN SCALES - GENERAL
PAINLEVEL_OUTOF10: 5
PAINLEVEL_OUTOF10: 7
PAINLEVEL_OUTOF10: 4
PAINLEVEL_OUTOF10: 0
PAINLEVEL_OUTOF10: 0
PAINLEVEL_OUTOF10: 3
PAINLEVEL_OUTOF10: 7

## 2021-10-23 ASSESSMENT — PAIN DESCRIPTION - DESCRIPTORS
DESCRIPTORS: ACHING
DESCRIPTORS: ACHING
DESCRIPTORS: ACHING;DISCOMFORT
DESCRIPTORS: ACHING;DISCOMFORT

## 2021-10-23 ASSESSMENT — PAIN DESCRIPTION - LOCATION
LOCATION: SHOULDER

## 2021-10-23 ASSESSMENT — PAIN DESCRIPTION - ONSET
ONSET: ON-GOING
ONSET: GRADUAL
ONSET: ON-GOING
ONSET: ON-GOING

## 2021-10-23 ASSESSMENT — PAIN DESCRIPTION - PROGRESSION
CLINICAL_PROGRESSION: GRADUALLY IMPROVING
CLINICAL_PROGRESSION: GRADUALLY IMPROVING
CLINICAL_PROGRESSION: NOT CHANGED

## 2021-10-23 ASSESSMENT — PAIN - FUNCTIONAL ASSESSMENT
PAIN_FUNCTIONAL_ASSESSMENT: ACTIVITIES ARE NOT PREVENTED
PAIN_FUNCTIONAL_ASSESSMENT: PREVENTS OR INTERFERES SOME ACTIVE ACTIVITIES AND ADLS
PAIN_FUNCTIONAL_ASSESSMENT: PREVENTS OR INTERFERES SOME ACTIVE ACTIVITIES AND ADLS

## 2021-10-23 ASSESSMENT — PAIN DESCRIPTION - FREQUENCY
FREQUENCY: INTERMITTENT

## 2021-10-23 ASSESSMENT — PAIN DESCRIPTION - ORIENTATION
ORIENTATION: RIGHT

## 2021-10-23 NOTE — FLOWSHEET NOTE
BP 80/42 automatically taken, and 80/40 manually. Patient asymptomatic,  Orders received to give 500ml NS bolus over 2 hours.     Bolus complete 2230, /61 map 70, heart rate 70

## 2021-10-23 NOTE — PROGRESS NOTES
Occupational Therapy  Facility/Department: [facility] ICU  Daily Treatment Note  NAME: Dhruv Schultz  : 1940  MRN: [id number]    Date of Service: 10/23/2021    Discharge Recommendations:  Patient would benefit from continued therapy after discharge     Assessment   OT Education: Plan of Care;OT Role;ADL Adaptive Strategies;Transfer Training;Equipment  Patient Education: safety awareness, activity promotion, hand placement and use of RW - fair to poor return  Activity Tolerance  Activity Tolerance: Patient limited by fatigue  Safety Devices  Safety Devices in place: Yes  Type of devices: Call light within reach;Nurse notified; Left in chair;Chair alarm in place  Restraints  Initially in place: No       Patient Diagnosis(es): The primary encounter diagnosis was Urinary tract infection with hematuria, site unspecified. Diagnoses of Chronic pain of both shoulders, Mass of right lung, DDD (degenerative disc disease), lumbar, Disorder of bone and articular cartilage, Gouty arthropathy, Acquired spondylolisthesis, Lumbar stenosis with neurogenic claudication, Lumbar disc herniation with radiculopathy, and Localized primary osteoarthritis of right hand were also pertinent to this visit. has a past medical history of Arthritis, Asthma, Back pain, Caffeine use, Carpal tunnel syndrome, Constipation, Dizziness, Gout, Hearing loss, History of blood transfusion, Hypertension, Obesity, Osteoarthritis, PONV (postoperative nausea and vomiting), Sleep apnea, Thyroid disease, Urinary incontinence, Urine incontinence, and Wears hearing aid. has a past surgical history that includes Tonsillectomy; Hysterectomy; knee surgery (Left); Foot surgery (Right); Cystoscopy; Cataract removal (Bilateral); joint replacement (Left, ); Knee arthroscopy (Left); Carpal tunnel release (Bilateral); Colonoscopy;  Cataract removal with implant (Bilateral); and pr total knee arthroplasty (Right, 7/17/2018). Restrictions  Restrictions/Precautions  Restrictions/Precautions: Fall Risk, Up as Tolerated  Required Braces or Orthoses?: No  Implants present? : Metal implants  Position Activity Restriction  Other position/activity restrictions: Up with assist    Subjective   General  Patient assessed for rehabilitation services?: Yes  Family / Caregiver Present: No  General Comment  Comments: RN ok'd pt for therapy this date. Pt agreeable to session and pleasant/cooperative throughout. Vital Signs  Patient Currently in Pain: Denies     Orientation  Orientation  Overall Orientation Status: Within Functional Limits    Objective    ADL  UE Bathing: Moderate assistance;Verbal cueing; Increased time to complete (seated on shower chair)  LE Bathing: Maximum assistance; Increased time to complete;Verbal cueing (seated/ standing at shower chair)  UE Dressing: Moderate assistance;Verbal cueing; Increased time to complete (to doff/don gown)  LE Dressing: Maximum assistance;Verbal cueing; Increased time to complete (to doff/don socks, brief, pants)     Balance  Sitting Balance: Stand by assistance (seated for bathing / dressing tasks ~20 min)  Standing Balance: Contact guard assistance (standing for dressing / bathing tasks ~8 min total)  Functional Mobility  Functional - Mobility Device: Rolling Walker  Activity: To/from bathroom  Assist Level: Contact guard assistance  Functional Mobility Comments: Min unsteadiness observed, no LOB. Pt required min VCs for safety awareness, proper use of RW, sequencing throughout. Shower Transfers  Shower - Transfer From: Carly Zurita (RW)  Shower - Transfer Type: To and From  Shower - Transfer To:  Shower seat with back  Shower - Technique: Ambulating  Shower Transfers: Minimal assistance  Shower Transfers Comments: Pt required mod VCs for hand placement on grab bars versus pulling up on RW with poor return  Bed mobility  Comment: Not assessed - pt up in chair upon arrival, returned to chair upon exit.  Transfers  Sit to stand: Minimal assistance (with RW)  Stand to sit: Minimal assistance (with RW)  Transfer Comments: Pt required significantly increased time/effort and min VCs for safety awareness, proper positioning and use of RW, sequencing.      Plan   Plan  Times per week: 5-6x/wk  Times per day: Daily  Current Treatment Recommendations: Balance Training, Functional Mobility Training, Endurance Training, Safety Education & Training, Self-Care / ADL, Patient/Caregiver Education & Training, Equipment Evaluation, Education, & procurement, Home Management Training, Strengthening, ROM, Cognitive Reorientation    AM-PAC Score  AM-[facility] Inpatient Daily Activity Raw Score: 14 (10/23/21 1342)  AM-PAC Inpatient ADL T-Scale Score : 33.39 (10/23/21 1342)  ADL Inpatient CMS 0-100% Score: 59.67 (10/23/21 1342)  ADL Inpatient CMS G-Code Modifier : CK (10/23/21 1342)    Goals  Short term goals  Time Frame for Short term goals: 14 visits  Short term goal 1: Pt will perform ADL tasks with mod IND using AE/DME PRN  Short term goal 2: Pt will perform functional transfers/functional mobility with mod IND using least restrictive AD  Short term goal 3: Pt will demo 10+ minutes standing tolerance with use of least restrictive AD for increased participation in ADL tasks  Short term goal 4: Pt will independently demo good safety awareness during engagement in ADL tasks and functional transfers/functional mobility     Therapy Time   Individual Concurrent Group Co-treatment   Time In 1240         Time Out 1320         Minutes 40         Timed Code Treatment Minutes: 38 Minutes     [employer], OTR/L

## 2021-10-23 NOTE — PROGRESS NOTES
Physical Therapy  Facility/Department: [facility] SURG ICU  Daily Treatment Note  NAME: Yoav Ivey  : 1940  MRN: [id number]    Date of Service: 10/23/2021    Discharge Recommendations:  Patient would benefit from continued therapy after discharge   PT Equipment Recommendations  Equipment Needed: No  Other: Pt reports she has a RW at home and advised she would be safer with its use at this time. Assessment   Body structures, Functions, Activity limitations: Decreased functional mobility ; Decreased endurance;Decreased ROM; Decreased strength;Decreased balance; Increased pain;Decreased safe awareness  Assessment: Pt with improving tolerance to gait distance today but still unable to stand or ambulate without physical assistance. Pt would be unsafe to return to her prior living arrangements without strict  assistance secondary to her decreased balance. Pt will benefit from further therapy at discharge. Treatment Diagnosis: dec mobility  Prognosis: Good  PT Education: Goals;Transfer Training;PT Role;Functional Mobility Training;Plan of Care;General Safety;Gait Training  REQUIRES PT FOLLOW UP: Yes  Activity Tolerance  Activity Tolerance: Patient limited by fatigue;Patient limited by endurance     Patient Diagnosis(es): The primary encounter diagnosis was Urinary tract infection with hematuria, site unspecified. Diagnoses of Chronic pain of both shoulders, Mass of right lung, DDD (degenerative disc disease), lumbar, Disorder of bone and articular cartilage, Gouty arthropathy, Acquired spondylolisthesis, Lumbar stenosis with neurogenic claudication, Lumbar disc herniation with radiculopathy, and Localized primary osteoarthritis of right hand were also pertinent to this visit.      has a past medical history of Arthritis, Asthma, Back pain, Caffeine use, Carpal tunnel syndrome, Constipation, Dizziness, Gout, Hearing loss, History of blood transfusion, Hypertension, Obesity, Osteoarthritis, PONV length;Decreased step height  Distance: 75' x 1,50' x 1  Comments: Pt does fatigue quickly with ambulation with moderate SOB noted; PO2 94-96% with mobility on room air. Stairs/Curb  Stairs?: No     Balance  Posture: Fair  Sitting - Static: Good  Sitting - Dynamic: Good  Standing - Static: Fair  Standing - Dynamic: Fair  Comments: standing balance assessed with RW  Exercises  Hip Flexion: 15x (B) LE  Knee Long Arc Quad: 15x (B) LE  Ankle Pumps: 15x (B) LE  Comments: Pillow squeeze 15x                        G-Code     OutComes Score                                                     AM-PAC Score  AM-PAC Inpatient Mobility Raw Score : 17 (10/21/21 1038)  AM-PAC Inpatient T-Scale Score : 42.13 (10/21/21 [facility])  Mobility Inpatient CMS 0-100% Score: 50.57 (10/21/21 [facility])  Mobility Inpatient CMS G-Code Modifier : CK (10/21/21 [facility])          Goals  Short term goals  Time Frame for Short term goals: 14 visits  Short term goal 1: Pt to be Independent with bed mobility. Short term goal 2: Pt to tolerate 30 minutes of therapy activity to improve endurance for mobility. Short term goal 3: Pt to transfer Modified (I) without LOB. Short term goal 4: Pt to ambulate with appropriate device >90' Modified Independent without LOB for functional household ambulation. Short term goal 5: Pt to negotiate 2 steps with 1 rail Modified(I)-Supervision without LOB for entry into home.   Patient Goals   Patient goals : Return home    Plan    Plan  Times per week: 5-6x/week  Times per day: Daily  Current Treatment Recommendations: Strengthening, Transfer Training, Endurance Training, Patient/Caregiver Education & Training, Gait Training, Balance Training, Functional Mobility Training, Stair training, Safety Education & Training  Safety Devices  Type of devices: Call light within reach, Left in chair, Gait belt, Nurse notified, Chair alarm in place  Restraints  Initially in place: No     Therapy Time   Individual Concurrent Group Co-treatment   Time In 4729         Time Out 0936         Minutes 39         Timed Code Treatment Minutes: 600 [address], PT

## 2021-10-23 NOTE — FLOWSHEET NOTE
10/23/21 1533 10/23/21 1700   Vitals   Resp 16  --    BP (!) 88/47 (!) 100/53   MAP (mmHg) (!) 60 69   BP Location Right upper arm  --    BP Upper/Lower Upper  --    BP Method Automatic  --    Patient Position Sitting;Up in chair  --      Ruby Cherry NP updated about low BP. States will review meds. No orders received at this time. Patient remains asymptomatic at this time.

## 2021-10-23 NOTE — PROGRESS NOTES
[facility]  Office: [address], DO, Julian Rivasroe, DO, Janerin Dry, DO, Dino Julissa Blood, DO, Martine Gonzalez MD, Tejal Fernandez MD, Roma Krishna MD, Franca Mensah MD, Marie Ceja MD, Mary Guzman MD, Everton Joy MD, Michelle Tovar MD, Guerita Hurd, DO, Sean Smith, DO, Merly Haq MD,  Danielle Barron, DO, Tracy Olivia MD, Whit Bonilla MD, Adriana Keene MD, Izabella Tsang MD, Sy Starr MD, Pablito Miranda MD, Lynette Motley, [facility], [facility], CNP, Dennison [place], CNP, Willy Ac, CNS, Balbir Kam, CNP, George Dhillon, CNP, Ashish Tsang, CNP, Abdiel Burger, CNP, Christine Mayo, CNP, Elias Nicole, CNP, Donnell More PA-C, Beti Villarreal, [facility], Missy Lipoma, CNP, Cesar Pearl, CNP, Phyllistdavid Wolfe, CNP, Beatriz Cowan, CNP, Thomas Kim, CNP, Nick Huber, CNP, Jasmin Springer [address]    Progress Note    10/23/2021    10:19 AM    Name:   Lawrence Moore  MRN:     [id number]     Acct:      [de-identified]   Room:   [address] [place] [address] Day:  3  Admit Date:  10/19/2021  7:15 PM    PCP:   Selma Leslie  Code Status:  Full Code    Subjective:     C/C:   Chief Complaint   Patient presents with    Pain     pt c/o arthritic pain in bilateral shoulders and bilateral wrists. Patient unable to get up the whole night and day    Dizziness     Interval History Status: improved. She denies sob or chest pain  She is complaining her the right arm/wrist is hurting again. Her arm is up on pillow. Brief History:     Liz Bodily a [de-identified] y.o. Non- / non  female who presents with Pain (pt c/o arthritic pain in bilateral shoulders and bilateral wrists.  Patient unable to get up the whole night and day) and Dizziness   and is admitted to the hospital for the management of Acute cystitis without hematuria.     The patient presented to the er related to weakness she believes was related to her chronic arthritis.  Patient informed staff that she had to stop her normal arthritis medications for a few days last week for a needle biopsy last week at [facility] patient states that she was seen by her pulmonologist, Dr. Yared Gamble, at [facility] last week for a lung biopsy. Suyapa Smith states that they could not get clear biopsies during bronchoscopy and she was sent for a needle biopsy by IR.  The patient is still awaiting the results of her path report. Giorgio Hammer patient states that while she was admitted she did have x-rays of her shoulders because of chronic pain which showed osteoarthritis.  She also reports chronic dizziness.  The patient reports that she has almost daily dizziness as if the room is spinning.  She states that she sees a neuro otolaryngologist for this and has been talking to him about placing a cochlear implant to help control her dizziness.  During my assessment she also complains of shortness of breath with activity that has been going on for few weeks.  She states she does not notice the shortness of breath much at rest but with stairs or much activity she does become winded.      According to the ER note EMS was concerned the patient may have had A. fib in route.  2 EKGs done in our ER show a sinus rhythm with ectopy but no A. fib.  I then completed an EKG on the floor because her rhythm did seem irregular per telemetry and on auscultation but again it just shows a sinus arrhythmia.  Patient denies any history of heart arrhythmias or palpitations.     During my assessment today she does complain of pain to her shoulders bilaterally and to her wrist bilaterally right greater than left with some improvement in her pain since admission.      Troponins resulted at 20, 26 and then 26.  States she may had a little bit of left chest pain but it felt more like it was shoulder related.  She denies nausea, vomiting, or diaphoresis.  She does report some shortness of breath but she has had Povidone-Iodine Rash    Hydrochlorothiazide      Pt states she takes Maxzide at home without any problems.     Meperidine Itching    Naproxen Hives       Current Meds:   Scheduled Meds:    cefTRIAXone (ROCEPHIN) IV  1,000 mg IntraVENous Q24H    magnesium sulfate  2,000 mg IntraVENous Once    triamterene-hydroCHLOROthiazide  1 tablet Oral Daily    Vitamin E  400 Units Oral Daily    lisinopril  2.5 mg Oral Daily    aspirin  81 mg Oral Q3 Days    albuterol sulfate HFA  2 puff Inhalation BID    budesonide-formoterol  2 puff Inhalation BID    allopurinol  300 mg Oral Daily    diphenhydrAMINE  25 mg Oral Nightly    oxybutynin  15 mg Oral Daily    cetirizine  5 mg Oral Daily    levothyroxine  88 mcg Oral Daily    pantoprazole  40 mg Oral QAM AC    vitamin C  500 mg Oral Daily    Vitamin D  2 tablet Oral Daily    calcium carbonate  500 mg Oral BID    therapeutic multivitamin-minerals  1 tablet Oral Daily    hydroxychloroquine  200 mg Oral BID    vitamin B and C  1 tablet Oral Daily    metFORMIN  500 mg Oral BID WC    sodium chloride flush  5-40 mL IntraVENous 2 times per day    enoxaparin  40 mg SubCUTAneous Daily    atorvastatin  40 mg Oral Nightly    methotrexate  15 mg Oral Weekly    predniSONE  5 mg Oral Once    predniSONE  10 mg Oral Daily     Continuous Infusions:    sodium chloride       PRN Meds: albuterol, polyethylene glycol, ondansetron, meclizine, diazePAM, sodium chloride flush, sodium chloride, acetaminophen **OR** acetaminophen, magnesium hydroxide, potassium chloride **OR** potassium alternative oral replacement **OR** potassium chloride, potassium chloride, magnesium sulfate, nitroGLYCERIN, HYDROcodone 5 mg - acetaminophen **OR** HYDROcodone 5 mg - acetaminophen, sodium chloride flush    Data:     Past Medical History:   has a past medical history of Arthritis, Asthma, Back pain, Caffeine use, Carpal tunnel syndrome, Constipation, Dizziness, Gout, Hearing loss, History of blood transfusion, Hypertension, Obesity, Osteoarthritis, PONV (postoperative nausea and vomiting), Sleep apnea, Thyroid disease, Urinary incontinence, Urine incontinence, and Wears hearing aid. Social History:   reports that she quit smoking about 46 years ago. She has a 4.25 pack-year smoking history. She has never used smokeless tobacco. She reports current alcohol use. She reports that she does not use drugs. Family History:   Family History   Problem Relation Age of Onset    Heart Failure Mother     Heart Disease Father        Vitals:  BP (!) 116/59   Pulse 82   Temp 98.2 °F (36.8 °C) (Oral)   Resp 16   Ht 5' 3\" (1.6 m)   Wt 223 lb 1.7 oz (101.2 kg)   SpO2 91%   BMI 39.52 kg/m²   Temp (24hrs), Av.2 °F (36.8 °C), Min:97.8 °F (36.6 °C), Max:99 °F (37.2 °C)    Recent Labs     10/21/21  0810   POCGLU 88       I/O (24Hr):     Intake/Output Summary (Last 24 hours) at 10/23/2021 1019  Last data filed at 10/23/2021 0555  Gross per 24 hour   Intake 1110 ml   Output 250 ml   Net 860 ml       Labs:  Hematology:  Recent Labs     10/20/21  1115 10/21/21  0408   WBC  --  13.2*   RBC  --  2.71*   HGB  --  9.4*   HCT  --  28.5*   MCV  --  105.1*   MCH  --  34.7*   MCHC  --  33.0   RDW  --  15.6*   PLT  --  245   MPV  --  7.4   .8* 197.1*     Chemistry:  Recent Labs     10/20/21  1115 10/20/21  2022 10/21/21  0408 10/21/21  1145 10/22/21  0519 10/22/21  1905 10/23/21  0546   NA  --   --  131*  --  136  --  133*   K  --   --  3.3*   < > 3.6* 4.5 3.9   CL  --   --  97*  --  102  --  100   CO2  --   --  26  --  23  --  23   GLUCOSE  --   --  128*  --  148*  --  98   BUN  --   --  19  --  17  --  20   CREATININE  --   --  0.65  --  0.48*  --  0.56   MG  --   --  1.1*   < > 1.4* 1.7 1.4*   ANIONGAP  --   --  8*  --  11  --  10   LABGLOM  --   --  >60  --  >60  --  >60   GFRAA  --   --  >60  --  >60  --  >60   CALCIUM  --   --  9.6  --  9.2  --  9.2   TROPHS 26* 25* 25*  --   --   --   --     < > = values in this interval not displayed. Recent Labs     10/21/21  0408 10/21/21  0810   PROT 5.2*  --    LABALBU 2.9*  --    AST 53*  --    ALT 41*  --    ALKPHOS 105*  --    BILITOT 0.41  --    CHOL 122  --    HDL 56  --    LDLCHOLESTEROL 55  --    CHOLHDLRATIO 2.2  --    TRIG 56  --    VLDL NOT REPORTED  --    POCGLU  --  88     ABG:No results found for: POCPH, PHART, PH, POCPCO2, ZVJ6IQF, PCO2, POCPO2, PO2ART, PO2, POCHCO3, FLE5AUK, HCO3, NBEA, PBEA, BEART, BE, THGBART, THB, APH0JUI, GXJY9GOT, X7VROYOR, O2SAT, FIO2  Lab Results   Component Value Date/Time    SPECIAL NOT REPORTED 10/19/2021 09:30 PM     Lab Results   Component Value Date/Time    CULTURE ENTEROBACTER CLOACAE >[id number] CFU/ML (A) 10/19/2021 09:30 PM    CULTURE KLEBSIELLA OXYTOCA >[id number] CFU/ML (A) 10/19/2021 09:30 PM       Radiology:  XR WRIST RIGHT (MIN 3 VIEWS)    Result Date: 10/19/2021  1. Severe proximal predominant degenerative changes at the carpus/wrist which can be seen with inflammatory arthropathy or gouty arthropathy. SLAC wrist deformity with widening of the scapholunate space and proximal migration of the capitate. 2. Mild osteoarthrosis at the 1st [facility] joint. 3. Moderate soft tissue swelling and edema about the wrist. 4. No acute fracture evident. 5. Severe impingement of the distal radioulnar joint. XR CHEST PORTABLE    Result Date: 10/19/2021  Masslike right basilar opacity could represent infectious process in the appropriate clinical setting. However, a chest CT should be considered for further evaluation     CT CHEST PULMONARY EMBOLISM W CONTRAST    Result Date: 10/20/2021  No evidence of pulmonary embolism. Large malignant neoplasm in the right lower lobe with extension to the ray and metastasis to the bilateral lungs, subcarinal lymph nodes, and possibly the left hilar lymph nodes. Trace left-sided layering pleural effusion. Physical Examination:     Physical Exam  Vitals and nursing note reviewed.    HENT:      Mouth/Throat: Mouth: Mucous membranes are moist.   Eyes:      Extraocular Movements: Extraocular movements intact. Cardiovascular:      Rate and Rhythm: Normal rate and regular rhythm. Pulses: Normal pulses. Heart sounds: Normal heart sounds. Pulmonary:      Effort: Pulmonary effort is normal.      Breath sounds: Normal breath sounds. Abdominal:      General: Bowel sounds are normal.      Palpations: Abdomen is soft. Musculoskeletal:         General: Tenderness (right wrist) present. Cervical back: Neck supple. Right lower leg: No edema. Left lower leg: No edema. Skin:     General: Skin is warm and dry. Capillary Refill: Capillary refill takes less than 2 seconds. Neurological:      Mental Status: She is alert and oriented to person, place, and time. GCS: GCS eye subscore is 4. GCS verbal subscore is 5. GCS motor subscore is 6. Psychiatric:         Mood and Affect: Mood normal.         Assessment:     Hospital Problems         Last Modified POA    * (Principal) Acute cystitis without hematuria 10/20/2021 Yes    Benign essential HTN 10/20/2021 Yes    Shortness of breath 10/20/2021 Yes    Gouty arthropathy 10/20/2021 Yes    Hyponatremia 10/20/2021 Yes    Urinary incontinence 10/20/2021 Yes    Weakness 10/20/2021 Yes    Urinary tract infection with hematuria 10/20/2021 Yes    Obesity (BMI 30-39.9) 10/20/2021 Yes    Mass of right lung 10/20/2021 Yes    Malignant neoplasm of lung ([address] 10/22/2021 Yes          Plan:     Acute cystitis without hematuria  Rocephin changed to Cipro per sensitivities     Hypertension  Continue home lisinopril and monitor for hypertension. Resume maxide     Cardiology consulted to evaluate heart rhythm  Plan for Holter monitor and cardiology follow-up as outpatient     Hyponatremia  Improved     Weakness  PT/OT to evaluate and treat  Patient would benefit from continued therapy. SNF placement recommended.   Case management assisting with discharge

## 2021-10-23 NOTE — PROGRESS NOTES
Called and left message with call back number for [facility] regarding pre cert.

## 2021-10-23 NOTE — PLAN OF CARE
Problem: Falls - Risk of:  Goal: Will remain free from falls  10/23/2021 1435 by Marcial Meade RN  Outcome: Ongoing   Fall assessment preformed. Bed in low locked position with call light and tray table within reach. Education given. Will continue to monitor. Problem: Pain:  Goal: Control of acute pain  10/23/2021 1435 by Marcial Meade RN  Outcome: Ongoing   Pain scale preformed per protocol and pt treated for pain as documented. Education given. Problem: Urinary Elimination:  Goal: Signs and symptoms of infection will decrease  Outcome: Ongoing   I an O.  Antibiotics given

## 2021-10-23 NOTE — PROGRESS NOTES
NP notified with Urine culture sensitivity that resulted with More sensitivity to Cipro. No new orders received.

## 2021-10-24 LAB
ANION GAP SERPL CALCULATED.3IONS-SCNC: 8 MMOL/L (ref 9–17)
BUN BLDV-MCNC: 24 MG/DL (ref 8–23)
BUN/CREAT BLD: ABNORMAL (ref 9–20)
C-REACTIVE PROTEIN: 122.8 MG/L (ref 0–5)
CALCIUM SERPL-MCNC: 9.4 MG/DL (ref 8.6–10.4)
CHLORIDE BLD-SCNC: 97 MMOL/L (ref 98–107)
CO2: 24 MMOL/L (ref 20–31)
CREAT SERPL-MCNC: 0.69 MG/DL (ref 0.5–0.9)
GFR AFRICAN AMERICAN: >60 ML/MIN
GFR NON-AFRICAN AMERICAN: >60 ML/MIN
GFR SERPL CREATININE-BSD FRML MDRD: ABNORMAL ML/MIN/{1.73_M2}
GFR SERPL CREATININE-BSD FRML MDRD: ABNORMAL ML/MIN/{1.73_M2}
GLUCOSE BLD-MCNC: 104 MG/DL (ref 70–99)
HCT VFR BLD CALC: 24.3 % (ref 36–46)
HEMOGLOBIN: 8 G/DL (ref 12–16)
MAGNESIUM: 1.5 MG/DL (ref 1.6–2.6)
MCH RBC QN AUTO: 34.6 PG (ref 26–34)
MCHC RBC AUTO-ENTMCNC: 32.9 G/DL (ref 31–37)
MCV RBC AUTO: 105.3 FL (ref 80–100)
NRBC AUTOMATED: ABNORMAL PER 100 WBC
OSMOLALITY URINE: 337 MOSM/KG (ref 80–1300)
PDW BLD-RTO: 15.9 % (ref 12.5–15.4)
PLATELET # BLD: 229 K/UL (ref 140–450)
PMV BLD AUTO: 7.9 FL (ref 6–12)
POTASSIUM SERPL-SCNC: 3.8 MMOL/L (ref 3.7–5.3)
RBC # BLD: 2.31 M/UL (ref 4–5.2)
SERUM OSMOLALITY: 275 MOSM/KG (ref 275–295)
SODIUM BLD-SCNC: 125 MMOL/L (ref 135–144)
SODIUM BLD-SCNC: 129 MMOL/L (ref 135–144)
SODIUM,UR: 30 MMOL/L
WBC # BLD: 13 K/UL (ref 3.5–11)

## 2021-10-24 PROCEDURE — 84295 ASSAY OF SERUM SODIUM: CPT

## 2021-10-24 PROCEDURE — APPSS45 APP SPLIT SHARED TIME 31-45 MINUTES: Performed by: NURSE PRACTITIONER

## 2021-10-24 PROCEDURE — 99232 SBSQ HOSP IP/OBS MODERATE 35: CPT | Performed by: INTERNAL MEDICINE

## 2021-10-24 PROCEDURE — 83930 ASSAY OF BLOOD OSMOLALITY: CPT

## 2021-10-24 PROCEDURE — 86140 C-REACTIVE PROTEIN: CPT

## 2021-10-24 PROCEDURE — 6370000000 HC RX 637 (ALT 250 FOR IP): Performed by: NURSE PRACTITIONER

## 2021-10-24 PROCEDURE — 83735 ASSAY OF MAGNESIUM: CPT

## 2021-10-24 PROCEDURE — 6360000002 HC RX W HCPCS: Performed by: NURSE PRACTITIONER

## 2021-10-24 PROCEDURE — 6370000000 HC RX 637 (ALT 250 FOR IP): Performed by: INTERNAL MEDICINE

## 2021-10-24 PROCEDURE — 94760 N-INVAS EAR/PLS OXIMETRY 1: CPT

## 2021-10-24 PROCEDURE — 2580000003 HC RX 258: Performed by: NURSE PRACTITIONER

## 2021-10-24 PROCEDURE — 85027 COMPLETE CBC AUTOMATED: CPT

## 2021-10-24 PROCEDURE — 83935 ASSAY OF URINE OSMOLALITY: CPT

## 2021-10-24 PROCEDURE — 80048 BASIC METABOLIC PNL TOTAL CA: CPT

## 2021-10-24 PROCEDURE — 84300 ASSAY OF URINE SODIUM: CPT

## 2021-10-24 PROCEDURE — 36415 COLL VENOUS BLD VENIPUNCTURE: CPT

## 2021-10-24 PROCEDURE — 94664 DEMO&/EVAL PT USE INHALER: CPT

## 2021-10-24 PROCEDURE — 1210000000 HC MED SURG R&B

## 2021-10-24 RX ORDER — MAGNESIUM SULFATE IN WATER 40 MG/ML
2000 INJECTION, SOLUTION INTRAVENOUS ONCE
Status: COMPLETED | OUTPATIENT
Start: 2021-10-24 | End: 2021-10-24

## 2021-10-24 RX ADMIN — MAGNESIUM OXIDE TAB 400 MG (241.3 MG ELEMENTAL MG) 400 MG: 400 (241.3 MG) TAB at 21:39

## 2021-10-24 RX ADMIN — LEVOTHYROXINE SODIUM 88 MCG: 88 TABLET ORAL at 06:16

## 2021-10-24 RX ADMIN — MULTIPLE VITAMINS W/ MINERALS TAB 1 TABLET: TAB at 10:05

## 2021-10-24 RX ADMIN — METFORMIN HYDROCHLORIDE 500 MG: 500 TABLET ORAL at 10:11

## 2021-10-24 RX ADMIN — ENOXAPARIN SODIUM 40 MG: 40 INJECTION SUBCUTANEOUS at 10:05

## 2021-10-24 RX ADMIN — CALCIUM CARBONATE 500 MG: 500 TABLET, CHEWABLE ORAL at 10:03

## 2021-10-24 RX ADMIN — ALLOPURINOL 300 MG: 300 TABLET ORAL at 10:04

## 2021-10-24 RX ADMIN — MAGNESIUM OXIDE TAB 400 MG (241.3 MG ELEMENTAL MG) 400 MG: 400 (241.3 MG) TAB at 15:20

## 2021-10-24 RX ADMIN — POLYETHYLENE GLYCOL 3350 17 G: 17 POWDER, FOR SOLUTION ORAL at 21:38

## 2021-10-24 RX ADMIN — METFORMIN HYDROCHLORIDE 500 MG: 500 TABLET ORAL at 18:34

## 2021-10-24 RX ADMIN — PANTOPRAZOLE SODIUM 40 MG: 40 TABLET, DELAYED RELEASE ORAL at 06:16

## 2021-10-24 RX ADMIN — VITAMIN C 1 TABLET: TAB at 10:03

## 2021-10-24 RX ADMIN — CALCIUM CARBONATE 500 MG: 500 TABLET, CHEWABLE ORAL at 21:39

## 2021-10-24 RX ADMIN — HYDROCODONE BITARTRATE AND ACETAMINOPHEN 2 TABLET: 5; 325 TABLET ORAL at 13:49

## 2021-10-24 RX ADMIN — HYDROXYCHLOROQUINE SULFATE 200 MG: 200 TABLET, FILM COATED ORAL at 10:05

## 2021-10-24 RX ADMIN — PREDNISONE 10 MG: 10 TABLET ORAL at 10:03

## 2021-10-24 RX ADMIN — CIPROFLOXACIN 500 MG: 250 TABLET, FILM COATED ORAL at 21:39

## 2021-10-24 RX ADMIN — ATORVASTATIN CALCIUM 40 MG: 40 TABLET, FILM COATED ORAL at 21:39

## 2021-10-24 RX ADMIN — Medication 1 CAPSULE: at 18:34

## 2021-10-24 RX ADMIN — OXYCODONE HYDROCHLORIDE AND ACETAMINOPHEN 500 MG: 500 TABLET ORAL at 10:05

## 2021-10-24 RX ADMIN — CIPROFLOXACIN 500 MG: 250 TABLET, FILM COATED ORAL at 10:05

## 2021-10-24 RX ADMIN — CETIRIZINE HYDROCHLORIDE 5 MG: 10 TABLET, FILM COATED ORAL at 10:03

## 2021-10-24 RX ADMIN — Medication 2000 UNITS: at 10:03

## 2021-10-24 RX ADMIN — MAGNESIUM SULFATE 2000 MG: 2 INJECTION INTRAVENOUS at 10:12

## 2021-10-24 RX ADMIN — SODIUM CHLORIDE, PRESERVATIVE FREE 10 ML: 5 INJECTION INTRAVENOUS at 21:44

## 2021-10-24 RX ADMIN — HYDROXYCHLOROQUINE SULFATE 200 MG: 200 TABLET, FILM COATED ORAL at 21:39

## 2021-10-24 RX ADMIN — Medication 1 CAPSULE: at 10:11

## 2021-10-24 ASSESSMENT — PAIN SCALES - GENERAL
PAINLEVEL_OUTOF10: 7
PAINLEVEL_OUTOF10: 0
PAINLEVEL_OUTOF10: 0

## 2021-10-24 NOTE — PLAN OF CARE
Problem: Falls - Risk of:  Goal: Will remain free from falls  Description: Will remain free from falls  10/24/2021 0353 by Edward Johnson RN  Outcome: Ongoing  10/23/2021 1435 by Harry Lynn RN  Outcome: Ongoing  Goal: Absence of physical injury  Description: Absence of physical injury  10/24/2021 0353 by Edward Johnson RN  Outcome: Ongoing  10/23/2021 1435 by Harry Lynn RN  Outcome: Ongoing     Problem: Skin Integrity:  Goal: Will show no infection signs and symptoms  Description: Will show no infection signs and symptoms  10/24/2021 0353 by Edward Johnson RN  Outcome: Ongoing  10/23/2021 1435 by Harry Lynn RN  Outcome: Ongoing  Goal: Absence of new skin breakdown  Description: Absence of new skin breakdown  10/24/2021 0353 by Edward Johnson RN  Outcome: Ongoing  10/23/2021 1435 by Harry Lynn RN  Outcome: Ongoing     Problem: Pain:  Goal: Pain level will decrease  Description: Pain level will decrease  10/24/2021 0353 by Edward Johnson RN  Outcome: Ongoing  10/23/2021 1435 by Harry Lynn RN  Outcome: Ongoing  Goal: Control of acute pain  Description: Control of acute pain  10/24/2021 0353 by Edward Johnson RN  Outcome: Ongoing  10/23/2021 1435 by Harry Lynn RN  Outcome: Ongoing  Goal: Control of chronic pain  Description: Control of chronic pain  10/24/2021 0353 by Edward Johnson RN  Outcome: Ongoing  10/23/2021 1435 by Harry Lynn RN  Outcome: Ongoing     Problem: Nutrition  Goal: Optimal nutrition therapy  10/24/2021 0353 by Edward Johnson RN  Outcome: Ongoing  10/23/2021 1435 by Harry Lynn RN  Outcome: Ongoing     Problem: Musculor/Skeletal Functional Status  Goal: Highest potential functional level  10/24/2021 0353 by Edward Johnson RN  Outcome: Ongoing  10/23/2021 1435 by Harry Lynn RN  Outcome: Ongoing  Goal: Absence of falls  10/24/2021 0353 by Edward Johnson RN  Outcome: Ongoing  10/23/2021 1435 by Harry Lynn RN  Outcome: Ongoing     Problem: Sensory:  Goal: General experience of comfort will improve  Description: General experience of comfort will improve  10/24/2021 0353 by Magan Coronado RN  Outcome: Ongoing  10/23/2021 1435 by Zoe Clark RN  Outcome: Ongoing     Problem: Urinary Elimination:  Goal: Signs and symptoms of infection will decrease  Description: Signs and symptoms of infection will decrease  10/24/2021 0353 by Magan Coronado RN  Outcome: Ongoing  10/23/2021 1435 by Zoe Clark RN  Outcome: Ongoing  Goal: Ability to reestablish a normal urinary elimination pattern will improve - after catheter removal  Description: Ability to reestablish a normal urinary elimination pattern will improve  10/24/2021 0353 by Magan Coronado RN  Outcome: Ongoing  10/23/2021 1435 by Zoe Clark RN  Outcome: Ongoing  Goal: Complications related to the disease process, condition or treatment will be avoided or minimized  Description: Complications related to the disease process, condition or treatment will be avoided or minimized  10/24/2021 0353 by Magan Coronado RN  Outcome: Ongoing  10/23/2021 1435 by Zoe Clark RN  Outcome: Ongoing

## 2021-10-24 NOTE — PROGRESS NOTES
[facility]  Office: [address], DO, Mundo Napoleon, DO, Taylos Retana, DO, Bamel Kamaraandra Blood, DO, Cordella Holstein, MD, Peter Gardner MD, Trav Lim MD, Noe Marques MD, Renella Boeck, MD, Jose A Gonzalez MD, Nadia Luna MD, Courtney Ambrose MD, Tamera Tolentino, DO, Matthieu Simental DO, Alannah Chilel MD,  Severiano Saavedra DO, Roderick Nguyễn MD, Bee Rivas MD, Nikko Woods MD, Kamryn Palacios MD, Alejandro Latham MD, Baldo Jaramillo MD, Bijal Zuniga, [facility], [facility], CNP, Chelo French, CNP, Caroline Taylor, CNS, Gaston Olivo, CNP, Kristal Kerr, CNP, Rosio Byrd, CNP, Cm Pan, CNP, Jeferson Judd, [facility], Dimitry Phillips, CNP, Tong Borja, PA-C, Familia Whittaker, [facility], Rashaad Christian, CNP, Mehrdad Carrion, CNP, Aditi Leger, CNP, Amrit Durbin, CNP, Rodrick Osullivan, CNP, Jaime Wilkinson, CNP, Jasmin [id number]    Progress Note    10/24/2021    10:43 AM    Name:   Myesha Chiang  MRN:     [id number]     Acct:      [de-identified]   Room:   [address] [place] [address] Day:  4  Admit Date:  10/19/2021  7:15 PM    PCP:   Charissa Madera  Code Status:  Full Code    Subjective:     C/C:   Chief Complaint   Patient presents with    Pain     pt c/o arthritic pain in bilateral shoulders and bilateral wrists. Patient unable to get up the whole night and day    Dizziness     Interval History Status: improved. She denies sob or chest pain  She is states her ROM in her right wrist is a little better. Her only complaint is fatigue. She says she feels she just chant get enough sleep. activity and OOB to chair encouraged. Sodium 129-maxzide stopped.  Continue to Monitor  Replacing mag-adding mag oxide bid daily    Discharge to [facility] when arrangements completed        Brief History:     Earnie Donald a 81 y.o. Non- / non  female who presents with Pain (pt c/o arthritic pain in bilateral shoulders and bilateral wrists. Patient unable to get up the whole night and day) and Dizziness   and is admitted to the hospital for the management of Acute cystitis without hematuria.     The patient presented to the er related to weakness she believes was related to her chronic arthritis.  Patient informed staff that she had to stop her normal arthritis medications for a few days last week for a needle biopsy last week at [facility] patient states that she was seen by her pulmonologist, Dr. Ivon Gilbert, at [place] [address] last week for a lung biopsy. Peggy Saini states that they could not get clear biopsies during bronchoscopy and she was sent for a needle biopsy by IR.  The patient is still awaiting the results of her path report. Agatha Pimentel patient states that while she was admitted she did have x-rays of her shoulders because of chronic pain which showed osteoarthritis.  She also reports chronic dizziness. The patient reports that she has almost daily dizziness as if the room is spinning.  She states that she sees a neuro otolaryngologist for this and has been talking to him about placing a cochlear implant to help control her dizziness.  During my assessment she also complains of shortness of breath with activity that has been going on for few weeks.  She states she does not notice the shortness of breath much at rest but with stairs or much activity she does become winded.      According to the ER note EMS was concerned the patient may have had A. fib in route.  2 EKGs done in our ER show a sinus rhythm with ectopy but no A. fib.  I then completed an EKG on the floor because her rhythm did seem irregular per telemetry and on auscultation but again it just shows a sinus arrhythmia.  Patient denies any history of heart arrhythmias or palpitations.     During my assessment today she does complain of pain to her shoulders bilaterally and to her wrist bilaterally right greater than left with some improvement in her pain since admission.    Troponins resulted at 20, 26 and then 26.  States she may had a little bit of left chest pain but it felt more like it was shoulder related.  She denies nausea, vomiting, or diaphoresis.  She does report some shortness of breath but she has had that for a few weeks.  WBCs are 15.5 UA shows positive nitrates, large leukocyte esterase, too many to count WBCs many bacteria.  Patient's been placed on Rocephin IV pending culture results.  She denies urinary symptoms     I asked cardiology to review her case and decide wether or not there is a concern for afib/flutter. Per the cardiology note they plan to order a 48-hour Holter monitor upon discharge to evaluate her rhythm.     I spoke with the patient at length about possibly going to a facility at discharge. She is pretty adamant that she does not want to go anywhere related to Covid concerns. I explained to her the concerns of her getting home and not being able to help herself much as far as getting food prepared and her ADLs. She did ultimately agreed to let the  to look into [facility] but she feels like she wants to probably go home. She would like to speak to her daughter in little bit. Planning to discharge once arrangements are made       ENTEROBACTER CLOACAE >[id number] CFU/ML   KLEBSIELLA OXYTOCA >[id number] CFU/ML   Planning to transition Rocephin to Cipro po. Review of Systems:     Review of Systems   Constitutional: Negative for chills, diaphoresis and fever. Didn't sleep very well   HENT: Negative for congestion. Eyes: Negative for visual disturbance. Respiratory: Negative for cough (white/blood streaked mucous ), chest tightness, shortness of breath and wheezing. Cardiovascular: Negative for chest pain, palpitations and leg swelling. Gastrointestinal: Negative for abdominal pain, blood in stool, constipation, diarrhea, nausea and vomiting. Genitourinary: Negative for difficulty urinating.    Musculoskeletal: Positive for myalgias (right arm ache). Neurological: Negative for dizziness, weakness (generalized weakness), light-headedness, numbness and headaches. All other systems reviewed and are negative. Medications: Allergies: Allergies   Allergen Reactions    Povidone-Iodine Rash    Hydrochlorothiazide      Pt states she takes Maxzide at home without any problems.     Meperidine Itching    Naproxen Hives       Current Meds:   Scheduled Meds:    magnesium sulfate  2,000 mg IntraVENous Once    magnesium oxide  400 mg Oral BID    magnesium sulfate  2,000 mg IntraVENous Once    ciprofloxacin  500 mg Oral 2 times per day    lactobacillus  1 capsule Oral BID WC    [Held by provider] triamterene-hydroCHLOROthiazide  1 tablet Oral Daily    Vitamin E  400 Units Oral Daily    aspirin  81 mg Oral Q3 Days    albuterol sulfate HFA  2 puff Inhalation BID    budesonide-formoterol  2 puff Inhalation BID    allopurinol  300 mg Oral Daily    diphenhydrAMINE  25 mg Oral Nightly    oxybutynin  15 mg Oral Daily    cetirizine  5 mg Oral Daily    levothyroxine  88 mcg Oral Daily    pantoprazole  40 mg Oral QAM AC    vitamin C  500 mg Oral Daily    Vitamin D  2 tablet Oral Daily    calcium carbonate  500 mg Oral BID    therapeutic multivitamin-minerals  1 tablet Oral Daily    hydroxychloroquine  200 mg Oral BID    vitamin B and C  1 tablet Oral Daily    metFORMIN  500 mg Oral BID WC    sodium chloride flush  5-40 mL IntraVENous 2 times per day    enoxaparin  40 mg SubCUTAneous Daily    atorvastatin  40 mg Oral Nightly    methotrexate  15 mg Oral Weekly    predniSONE  5 mg Oral Once    predniSONE  10 mg Oral Daily     Continuous Infusions:    sodium chloride       PRN Meds: albuterol, polyethylene glycol, ondansetron, meclizine, diazePAM, sodium chloride flush, sodium chloride, acetaminophen **OR** acetaminophen, magnesium hydroxide, potassium chloride **OR** potassium alternative oral replacement **OR** potassium chloride, potassium chloride, magnesium sulfate, nitroGLYCERIN, HYDROcodone 5 mg - acetaminophen **OR** HYDROcodone 5 mg - acetaminophen, sodium chloride flush    Data:     Past Medical History:   has a past medical history of Arthritis, Asthma, Back pain, Caffeine use, Carpal tunnel syndrome, Constipation, Dizziness, Gout, Hearing loss, History of blood transfusion, Hypertension, Obesity, Osteoarthritis, PONV (postoperative nausea and vomiting), Sleep apnea, Thyroid disease, Urinary incontinence, Urine incontinence, and Wears hearing aid. Social History:   reports that she quit smoking about 46 years ago. She has a 4.25 pack-year smoking history. She has never used smokeless tobacco. She reports current alcohol use. She reports that she does not use drugs. Family History:   Family History   Problem Relation Age of Onset    Heart Failure Mother     Heart Disease Father        Vitals:  BP (!) 108/54   Pulse 87   Temp 99.8 °F (37.7 °C) (Oral)   Resp 16   Ht 5' 3\" (1.6 m)   Wt 223 lb 1.7 oz (101.2 kg)   SpO2 92%   BMI 39.52 kg/m²   Temp (24hrs), Av.8 °F (37.1 °C), Min:97.6 °F (36.4 °C), Max:99.8 °F (37.7 °C)    No results for input(s): POCGLU in the last 72 hours. I/O (24Hr):     Intake/Output Summary (Last 24 hours) at 10/24/2021 1043  Last data filed at 10/24/2021 0618  Gross per 24 hour   Intake 660 ml   Output 700 ml   Net -40 ml       Labs:  Hematology:  Recent Labs     10/24/21  0515   WBC 13.0*   RBC 2.31*   HGB 8.0*   HCT 24.3*   .3*   MCH 34.6*   MCHC 32.9   RDW 15.9*      MPV 7.9   .8*     Chemistry:  Recent Labs     10/22/21  0519 10/22/21  0519 10/22/21  1905 10/22/21  1905 10/23/21  0546 10/23/21  1327 10/24/21  0515     --   --   --  133*  --  129*   K 3.6*   < > 4.5  --  3.9  --  3.8     --   --   --  100  --  97*   CO2 23  --   --   --  23  --  24   GLUCOSE 148*  --   --   --  98  --  104*   BUN 17  --   --   --  20  --  24*   CREATININE 0.48*  --   --   --  0.56  --  0.69   MG 1.4*   < > 1.7   < > 1.4* 1.7 1.5*   ANIONGAP 11  --   --   --  10  --  8*   LABGLOM >60  --   --   --  >60  --  >60   GFRAA >60  --   --   --  >60  --  >60   CALCIUM 9.2  --   --   --  9.2  --  9.4    < > = values in this interval not displayed. No results for input(s): PROT, LABALBU, LABA1C, V1EFYOV, G1KMSNN, FT4, TSH, AST, ALT, LDH, GGT, ALKPHOS, LABGGT, BILITOT, BILIDIR, AMMONIA, AMYLASE, LIPASE, LACTATE, CHOL, HDL, LDLCHOLESTEROL, CHOLHDLRATIO, TRIG, VLDL, SWJ06NW, PHENYTOIN, PHENYF, URICACID, POCGLU in the last 72 hours. ABG:No results found for: POCPH, PHART, PH, POCPCO2, DDV0OBB, PCO2, POCPO2, PO2ART, PO2, POCHCO3, RFF0YCS, HCO3, NBEA, PBEA, BEART, BE, THGBART, THB, GCP9JGZ, FUZW5PXE, G5YAIZCK, O2SAT, FIO2  Lab Results   Component Value Date/Time    SPECIAL NOT REPORTED 10/19/2021 09:30 PM     Lab Results   Component Value Date/Time    CULTURE ENTEROBACTER CLOACAE >[id number] CFU/ML (A) 10/19/2021 09:30 PM    CULTURE KLEBSIELLA OXYTOCA >[id number] CFU/ML (A) 10/19/2021 09:30 PM       Radiology:  XR WRIST RIGHT (MIN 3 VIEWS)    Result Date: 10/19/2021  1. Severe proximal predominant degenerative changes at the carpus/wrist which can be seen with inflammatory arthropathy or gouty arthropathy. SLAC wrist deformity with widening of the scapholunate space and proximal migration of the capitate. 2. Mild osteoarthrosis at the 1st [facility]. 3. Moderate soft tissue swelling and edema about the wrist. 4. No acute fracture evident. 5. Severe impingement of the distal radioulnar joint. XR CHEST PORTABLE    Result Date: 10/19/2021  Masslike right basilar opacity could represent infectious process in the appropriate clinical setting. However, a chest CT should be considered for further evaluation     CT CHEST PULMONARY EMBOLISM W CONTRAST    Result Date: 10/20/2021  No evidence of pulmonary embolism.  Large malignant neoplasm in the right lower lobe with extension to the ray and metastasis to the bilateral lungs, subcarinal lymph nodes, and possibly the left hilar lymph nodes. Trace left-sided layering pleural effusion. Physical Examination:     Physical Exam  Vitals and nursing note reviewed. HENT:      Mouth/Throat:      Mouth: Mucous membranes are moist.   Eyes:      Extraocular Movements: Extraocular movements intact. Cardiovascular:      Rate and Rhythm: Normal rate and regular rhythm. Pulses: Normal pulses. Heart sounds: Normal heart sounds. Pulmonary:      Effort: Pulmonary effort is normal.      Breath sounds: Normal breath sounds. Abdominal:      General: Bowel sounds are normal.      Palpations: Abdomen is soft. Musculoskeletal:         General: Tenderness (right wrist) present. Cervical back: Neck supple. Right lower leg: No edema. Left lower leg: No edema. Skin:     General: Skin is warm and dry. Capillary Refill: Capillary refill takes less than 2 seconds. Neurological:      Mental Status: She is alert and oriented to person, place, and time. GCS: GCS eye subscore is 4. GCS verbal subscore is 5. GCS motor subscore is 6. Psychiatric:         Mood and Affect: Mood normal.         Assessment:     Hospital Problems         Last Modified POA    * (Principal) UTI due to Klebsiella and Enterobacter 10/23/2021 Yes    Benign essential HTN 10/20/2021 Yes    Shortness of breath 10/20/2021 Yes    Gouty arthropathy 10/20/2021 Yes    Hyponatremia 10/20/2021 Yes    Urinary incontinence 10/20/2021 Yes    Weakness 10/20/2021 Yes    Acute cystitis without hematuria 10/23/2021 Yes    Obesity (BMI 30-39.9) 10/20/2021 Yes    Mass of right lung 10/20/2021 Yes    Malignant neoplasm of lung ([facility] [address] 10/22/2021 Yes          Plan:     Acute cystitis without hematuria  Rocephin changed to Cipro per sensitivities     Hypertension  Continue home lisinopril and monitor for hypertension.   Resume maxide    Adding mag oxide po bid     Cardiology consulted to evaluate heart rhythm  Plan for Holter monitor and cardiology follow-up as outpatient     Hyponatremia  Stop Maxzide and monitor     Weakness  PT/OT to evaluate and treat  Patient would benefit from continued therapy. SNF placement recommended. Case management assisting with discharge planning     Continue home Plaquenil, Synthroid, methotrexate, Ditropan, PPI, and multivitamin.  resume Glucophage.  ncrease prednisone to 10 mg daily for now     Monitor and replace electrolytes as needed     DVT prophylaxis     Norco for pain     Patient encouraged to follow-up with her rheumatologist related to arthritis/joint swelling  She is encouraged to follow-up with Dr. Sunita Gaines  She is also encouraged to follow-up with her PCP in 7 to 10 days    Case management assisting with discharge [address] [facility] [address] [facility], [facility]  10/24/2021  10:43 AM

## 2021-10-25 VITALS
HEIGHT: 63 IN | OXYGEN SATURATION: 94 % | DIASTOLIC BLOOD PRESSURE: 65 MMHG | BODY MASS INDEX: 39.84 KG/M2 | HEART RATE: 89 BPM | RESPIRATION RATE: 18 BRPM | SYSTOLIC BLOOD PRESSURE: 114 MMHG | WEIGHT: 224.87 LBS | TEMPERATURE: 99.2 F

## 2021-10-25 LAB
-: NORMAL
ANION GAP SERPL CALCULATED.3IONS-SCNC: 11 MMOL/L (ref 9–17)
BUN BLDV-MCNC: 20 MG/DL (ref 8–23)
BUN/CREAT BLD: ABNORMAL (ref 9–20)
CALCIUM SERPL-MCNC: 9.6 MG/DL (ref 8.6–10.4)
CHLORIDE BLD-SCNC: 97 MMOL/L (ref 98–107)
CO2: 24 MMOL/L (ref 20–31)
CREAT SERPL-MCNC: 0.58 MG/DL (ref 0.5–0.9)
GFR AFRICAN AMERICAN: >60 ML/MIN
GFR NON-AFRICAN AMERICAN: >60 ML/MIN
GFR SERPL CREATININE-BSD FRML MDRD: ABNORMAL ML/MIN/{1.73_M2}
GFR SERPL CREATININE-BSD FRML MDRD: ABNORMAL ML/MIN/{1.73_M2}
GLUCOSE BLD-MCNC: 86 MG/DL (ref 70–99)
HCT VFR BLD CALC: 23.7 % (ref 36–46)
HEMOGLOBIN: 8 G/DL (ref 12–16)
MAGNESIUM: 1.4 MG/DL (ref 1.6–2.6)
MCH RBC QN AUTO: 34.6 PG (ref 26–34)
MCHC RBC AUTO-ENTMCNC: 33.6 G/DL (ref 31–37)
MCV RBC AUTO: 102.9 FL (ref 80–100)
NRBC AUTOMATED: ABNORMAL PER 100 WBC
PDW BLD-RTO: 15.4 % (ref 12.5–15.4)
PLATELET # BLD: 237 K/UL (ref 140–450)
PMV BLD AUTO: 7.6 FL (ref 6–12)
POTASSIUM SERPL-SCNC: 4.1 MMOL/L (ref 3.7–5.3)
RBC # BLD: 2.3 M/UL (ref 4–5.2)
REASON FOR REJECTION: NORMAL
SARS-COV-2, RAPID: NOT DETECTED
SODIUM BLD-SCNC: 132 MMOL/L (ref 135–144)
SPECIMEN DESCRIPTION: NORMAL
WBC # BLD: 13.3 K/UL (ref 3.5–11)
ZZ NTE CLEAN UP: ORDERED TEST: NORMAL
ZZ NTE WITH NAME CLEAN UP: SPECIMEN SOURCE: NORMAL

## 2021-10-25 PROCEDURE — 6370000000 HC RX 637 (ALT 250 FOR IP): Performed by: NURSE PRACTITIONER

## 2021-10-25 PROCEDURE — 83735 ASSAY OF MAGNESIUM: CPT

## 2021-10-25 PROCEDURE — 6360000002 HC RX W HCPCS: Performed by: NURSE PRACTITIONER

## 2021-10-25 PROCEDURE — 36415 COLL VENOUS BLD VENIPUNCTURE: CPT

## 2021-10-25 PROCEDURE — 6370000000 HC RX 637 (ALT 250 FOR IP): Performed by: INTERNAL MEDICINE

## 2021-10-25 PROCEDURE — 87635 SARS-COV-2 COVID-19 AMP PRB: CPT

## 2021-10-25 PROCEDURE — 2580000003 HC RX 258: Performed by: NURSE PRACTITIONER

## 2021-10-25 PROCEDURE — 85027 COMPLETE CBC AUTOMATED: CPT

## 2021-10-25 PROCEDURE — 80048 BASIC METABOLIC PNL TOTAL CA: CPT

## 2021-10-25 PROCEDURE — 99239 HOSP IP/OBS DSCHRG MGMT >30: CPT | Performed by: NURSE PRACTITIONER

## 2021-10-25 PROCEDURE — 97116 GAIT TRAINING THERAPY: CPT

## 2021-10-25 PROCEDURE — 97110 THERAPEUTIC EXERCISES: CPT

## 2021-10-25 RX ORDER — GUAIFENESIN 600 MG/1
600 TABLET, EXTENDED RELEASE ORAL 2 TIMES DAILY
Status: DISCONTINUED | OUTPATIENT
Start: 2021-10-25 | End: 2021-10-25 | Stop reason: HOSPADM

## 2021-10-25 RX ORDER — GUAIFENESIN 600 MG/1
600 TABLET, EXTENDED RELEASE ORAL 2 TIMES DAILY
Qty: 14 TABLET | Refills: 0 | DISCHARGE
Start: 2021-10-25 | End: 2021-11-01

## 2021-10-25 RX ORDER — BISACODYL 10 MG
10 SUPPOSITORY, RECTAL RECTAL ONCE
Status: COMPLETED | OUTPATIENT
Start: 2021-10-25 | End: 2021-10-25

## 2021-10-25 RX ORDER — MAGNESIUM SULFATE IN WATER 40 MG/ML
2000 INJECTION, SOLUTION INTRAVENOUS ONCE
Status: COMPLETED | OUTPATIENT
Start: 2021-10-25 | End: 2021-10-25

## 2021-10-25 RX ORDER — LACTOBACILLUS RHAMNOSUS GG 10B CELL
1 CAPSULE ORAL 2 TIMES DAILY WITH MEALS
Qty: 14 CAPSULE | Refills: 0 | DISCHARGE
Start: 2021-10-25 | End: 2021-11-01

## 2021-10-25 RX ORDER — POLYETHYLENE GLYCOL 3350 17 G/17G
17 POWDER, FOR SOLUTION ORAL 2 TIMES DAILY
Status: DISCONTINUED | OUTPATIENT
Start: 2021-10-25 | End: 2021-10-25 | Stop reason: HOSPADM

## 2021-10-25 RX ADMIN — LEVOTHYROXINE SODIUM 88 MCG: 88 TABLET ORAL at 05:08

## 2021-10-25 RX ADMIN — CALCIUM CARBONATE 500 MG: 500 TABLET, CHEWABLE ORAL at 09:30

## 2021-10-25 RX ADMIN — PREDNISONE 10 MG: 10 TABLET ORAL at 09:45

## 2021-10-25 RX ADMIN — METFORMIN HYDROCHLORIDE 500 MG: 500 TABLET ORAL at 09:43

## 2021-10-25 RX ADMIN — VITAMIN C 1 TABLET: TAB at 09:33

## 2021-10-25 RX ADMIN — MULTIPLE VITAMINS W/ MINERALS TAB 1 TABLET: TAB at 09:43

## 2021-10-25 RX ADMIN — POLYETHYLENE GLYCOL 3350 17 G: 17 POWDER, FOR SOLUTION ORAL at 09:47

## 2021-10-25 RX ADMIN — OXYCODONE HYDROCHLORIDE AND ACETAMINOPHEN 500 MG: 500 TABLET ORAL at 09:33

## 2021-10-25 RX ADMIN — Medication 1 CAPSULE: at 09:42

## 2021-10-25 RX ADMIN — SODIUM CHLORIDE, PRESERVATIVE FREE 10 ML: 5 INJECTION INTRAVENOUS at 09:20

## 2021-10-25 RX ADMIN — ASPIRIN 81 MG: 81 TABLET, COATED ORAL at 09:30

## 2021-10-25 RX ADMIN — ENOXAPARIN SODIUM 40 MG: 40 INJECTION SUBCUTANEOUS at 09:47

## 2021-10-25 RX ADMIN — CETIRIZINE HYDROCHLORIDE 5 MG: 10 TABLET, FILM COATED ORAL at 09:35

## 2021-10-25 RX ADMIN — GUAIFENESIN 600 MG: 600 TABLET, EXTENDED RELEASE ORAL at 09:46

## 2021-10-25 RX ADMIN — PANTOPRAZOLE SODIUM 40 MG: 40 TABLET, DELAYED RELEASE ORAL at 07:21

## 2021-10-25 RX ADMIN — MAGNESIUM OXIDE TAB 400 MG (241.3 MG ELEMENTAL MG) 400 MG: 400 (241.3 MG) TAB at 09:33

## 2021-10-25 RX ADMIN — ALLOPURINOL 300 MG: 300 TABLET ORAL at 09:46

## 2021-10-25 RX ADMIN — BISACODYL 10 MG: 10 SUPPOSITORY RECTAL at 10:56

## 2021-10-25 RX ADMIN — CIPROFLOXACIN 500 MG: 250 TABLET, FILM COATED ORAL at 09:30

## 2021-10-25 RX ADMIN — HYDROXYCHLOROQUINE SULFATE 200 MG: 200 TABLET, FILM COATED ORAL at 09:46

## 2021-10-25 RX ADMIN — Medication 2000 UNITS: at 09:41

## 2021-10-25 RX ADMIN — MAGNESIUM SULFATE 2000 MG: 2 INJECTION INTRAVENOUS at 09:21

## 2021-10-25 ASSESSMENT — PAIN SCALES - GENERAL: PAINLEVEL_OUTOF10: 0

## 2021-10-25 NOTE — PROGRESS NOTES
[facility]  Office: [address], DO, Julian Rivasroe, DO, Janerin Dry, DO, Dino Costa Blood, DO, Martine Gonzalez MD, Tejal Fernandez MD, Roma Krishna MD, Franca Mensah MD, Marie Ceja MD, Mary Guzman MD, Everton Joy MD, Michelle Tovar MD, Guerita Hurd, DO, Sean Smith, DO, Merly Haq MD,  Danielle Barron, DO, Tracy Olivia MD, Whit Bonilla MD, Adriana Keene MD, Izabella Tsang MD, Sy Starr MD, Pablito Miranda MD, Lynette Motley, [facility], [facility], CNP, [place], CNP, Willy Ac, CNS, Balbir Kam, CNP, George Dhillon, CNP, Ashish Tsang, CNP, Abdiel Burger, CNP, Christine Mayo, CNP, Elias Nicole, CNP, Donnell More PA-C, Beti Villarreal, [facility], Missy Lipoma, CNP, Cesar Pearl, CNP, Phyllistdavid Wolfe, CNP, Beatriz Cowan, CNP, Thomas Kim, CNP, Nick Huber, CNP, Jsamin Springer 1732    Progress Note    10/25/2021    11:27 AM    Name:   Lawrecne Moore  MRN:     [id number]     Acct:      [de-identified]   Room:   [address] [place] [address] Day:  5  Admit Date:  10/19/2021  7:15 PM    PCP:   Selma Leslie  Code Status:  Full Code    Subjective:     C/C:   Chief Complaint   Patient presents with    Pain     pt c/o arthritic pain in bilateral shoulders and bilateral wrists. Patient unable to get up the whole night and day    Dizziness     Interval History Status: improved. She denies sob or chest pain  She is states her ROM in her right wrist is a little better. Her only complaint is occasional cough and fatigue. She says she feels she just chant get enough sleep. activity and OOB to chair encouraged. She reports no BM for a few days. No stools documented. Plan Miralax and Doculax.    Mucinex to help with congestion    Discharge to [facility] when arrangements completed        Brief History:     Liz walters [de-identified] y.o. Non- / non  female who presents with Pain (pt c/o arthritic pain in bilateral shoulders and bilateral wrists. Patient unable to get up the whole night and day) and Dizziness   and is admitted to the hospital for the management of Acute cystitis without hematuria.     The patient presented to the er related to weakness she believes was related to her chronic arthritis.  Patient informed staff that she had to stop her normal arthritis medications for a few days last week for a needle biopsy last week at [facility] patient states that she was seen by her pulmonologist, Dr. Moody Marley, at [facility] last week for a lung biopsy. Amara Mike states that they could not get clear biopsies during bronchoscopy and she was sent for a needle biopsy by IR.  The patient is still awaiting the results of her path report. Starla Morales patient states that while she was admitted she did have x-rays of her shoulders because of chronic pain which showed osteoarthritis.  She also reports chronic dizziness.  The patient reports that she has almost daily dizziness as if the room is spinning.  She states that she sees a neuro otolaryngologist for this and has been talking to him about placing a cochlear implant to help control her dizziness.  During my assessment she also complains of shortness of breath with activity that has been going on for few weeks.  She states she does not notice the shortness of breath much at rest but with stairs or much activity she does become winded.      According to the ER note EMS was concerned the patient may have had A. fib in route.  2 EKGs done in our ER show a sinus rhythm with ectopy but no A. fib.  I then completed an EKG on the floor because her rhythm did seem irregular per telemetry and on auscultation but again it just shows a sinus arrhythmia.  Patient denies any history of heart arrhythmias or palpitations.     During my assessment today she does complain of pain to her shoulders bilaterally and to her wrist bilaterally right greater than left with some improvement in her pain since admission.      Troponins resulted at 20, 26 and then 26.  States she may had a little bit of left chest pain but it felt more like it was shoulder related.  She denies nausea, vomiting, or diaphoresis.  She does report some shortness of breath but she has had that for a few weeks.  WBCs are 15.5 UA shows positive nitrates, large leukocyte esterase, too many to count WBCs many bacteria.  Patient's been placed on Rocephin IV pending culture results.  She denies urinary symptoms     I asked cardiology to review her case and decide wether or not there is a concern for afib/flutter. Per the cardiology note they plan to order a 48-hour Holter monitor upon discharge to evaluate her rhythm.     I spoke with the patient at length about possibly going to a facility at discharge. She is pretty adamant that she does not want to go anywhere related to Covid concerns. I explained to her the concerns of her getting home and not being able to help herself much as far as getting food prepared and her ADLs. She did ultimately agreed to let the  to look into [place] but she feels like she wants to probably go home. She would like to speak to her daughter in little bit. Planning to discharge once arrangements are made       ENTEROBACTER CLOACAE >968065 CFU/ML   KLEBSIELLA OXYTOCA >363828 CFU/ML   Planning to transition Rocephin to Cipro po. Review of Systems:     Review of Systems   Constitutional: Negative for chills, diaphoresis and fever. Didn't sleep very well   HENT: Negative for congestion. Eyes: Negative for visual disturbance. Respiratory: +cough (white/blood streaked mucous ), chest tightness, shortness of breath and wheezing. Cardiovascular: Negative for chest pain, palpitations and leg swelling. Gastrointestinal: Negative for abdominal pain, blood in stool, constipation, diarrhea, nausea and vomiting. Genitourinary: Negative for difficulty urinating. Musculoskeletal: Positive for myalgias (right arm ache). Neurological: Negative for dizziness, weakness (generalized weakness), light-headedness, numbness and headaches. All other systems reviewed and are negative. Medications: Allergies: Allergies   Allergen Reactions    Povidone-Iodine Rash    Hydrochlorothiazide      Pt states she takes Maxzide at home without any problems.     Meperidine Itching    Naproxen Hives       Current Meds:   Scheduled Meds:    guaiFENesin  600 mg Oral BID    polyethylene glycol  17 g Oral BID    magnesium oxide  400 mg Oral BID    magnesium sulfate  2,000 mg IntraVENous Once    ciprofloxacin  500 mg Oral 2 times per day    lactobacillus  1 capsule Oral BID WC    [Held by provider] triamterene-hydroCHLOROthiazide  1 tablet Oral Daily    Vitamin E  400 Units Oral Daily    aspirin  81 mg Oral Q3 Days    albuterol sulfate HFA  2 puff Inhalation BID    budesonide-formoterol  2 puff Inhalation BID    allopurinol  300 mg Oral Daily    diphenhydrAMINE  25 mg Oral Nightly    oxybutynin  15 mg Oral Daily    cetirizine  5 mg Oral Daily    levothyroxine  88 mcg Oral Daily    pantoprazole  40 mg Oral QAM AC    vitamin C  500 mg Oral Daily    Vitamin D  2 tablet Oral Daily    calcium carbonate  500 mg Oral BID    therapeutic multivitamin-minerals  1 tablet Oral Daily    hydroxychloroquine  200 mg Oral BID    vitamin B and C  1 tablet Oral Daily    metFORMIN  500 mg Oral BID WC    sodium chloride flush  5-40 mL IntraVENous 2 times per day    enoxaparin  40 mg SubCUTAneous Daily    atorvastatin  40 mg Oral Nightly    methotrexate  15 mg Oral Weekly    predniSONE  5 mg Oral Once    predniSONE  10 mg Oral Daily     Continuous Infusions:    sodium chloride       PRN Meds: albuterol, polyethylene glycol, ondansetron, meclizine, diazePAM, sodium chloride flush, sodium chloride, acetaminophen **OR** acetaminophen, magnesium hydroxide, potassium chloride **OR** potassium alternative oral replacement **OR** potassium chloride, potassium chloride, magnesium sulfate, nitroGLYCERIN, HYDROcodone 5 mg - acetaminophen **OR** HYDROcodone 5 mg - acetaminophen, sodium chloride flush    Data:     Past Medical History:   has a past medical history of Arthritis, Asthma, Back pain, Caffeine use, Carpal tunnel syndrome, Constipation, Dizziness, Gout, Hearing loss, History of blood transfusion, Hypertension, Obesity, Osteoarthritis, PONV (postoperative nausea and vomiting), Sleep apnea, Thyroid disease, Urinary incontinence, Urine incontinence, and Wears hearing aid. Social History:   reports that she quit smoking about 46 years ago. She has a 4.25 pack-year smoking history. She has never used smokeless tobacco. She reports current alcohol use. She reports that she does not use drugs. Family History:   Family History   Problem Relation Age of Onset    Heart Failure Mother     Heart Disease Father        Vitals:  /65   Pulse 89   Temp 99.2 °F (37.3 °C) (Oral)   Resp 18   Ht 5' 3\" (1.6 m)   Wt 224 lb 13.9 oz (102 kg)   SpO2 94%   BMI 39.83 kg/m²   Temp (24hrs), Av.9 °F (37.2 °C), Min:98.2 °F (36.8 °C), Max:99.5 °F (37.5 °C)    No results for input(s): POCGLU in the last 72 hours. I/O (24Hr):     Intake/Output Summary (Last 24 hours) at 10/25/2021 1127  Last data filed at 10/25/2021 0536  Gross per 24 hour   Intake 1236 ml   Output 850 ml   Net 386 ml       Labs:  Hematology:  Recent Labs     10/24/21  0515 10/25/21  0649   WBC 13.0* 13.3*   RBC 2.31* 2.30*   HGB 8.0* 8.0*   HCT 24.3* 23.7*   .3* 102.9*   MCH 34.6* 34.6*   MCHC 32.9 33.6   RDW 15.9* 15.4    237   MPV 7.9 7.6   .8*  --      Chemistry:  Recent Labs     10/23/21  0546 10/23/21  0546 10/23/21  1327 10/24/21  0515 10/24/21  1410 10/25/21  0544   *   < >  --  129* 125* 132*   K 3.9  --   --  3.8  --  4.1     --   --  97*  --  97*   CO2 23  --   --  24  --  24 GLUCOSE 98  --   --  104*  --  86   BUN 20  --   --  24*  --  20   CREATININE 0.56  --   --  0.69  --  0.58   MG 1.4*   < > 1.7 1.5*  --  1.4*   ANIONGAP 10  --   --  8*  --  11   LABGLOM >60  --   --  >60  --  >60   GFRAA >60  --   --  >60  --  >60   CALCIUM 9.2  --   --  9.4  --  9.6    < > = values in this interval not displayed. No results for input(s): PROT, LABALBU, LABA1C, O2QTAIZ, B9AUHZB, FT4, TSH, AST, ALT, LDH, GGT, ALKPHOS, LABGGT, BILITOT, BILIDIR, AMMONIA, AMYLASE, LIPASE, LACTATE, CHOL, HDL, LDLCHOLESTEROL, CHOLHDLRATIO, TRIG, VLDL, HEV15JN, PHENYTOIN, PHENYF, URICACID, POCGLU in the last 72 hours. ABG:No results found for: POCPH, PHART, PH, POCPCO2, YIU8HWB, PCO2, POCPO2, PO2ART, PO2, POCHCO3, IDX4MHK, HCO3, NBEA, PBEA, BEART, BE, THGBART, THB, EAK6VPS, WONH9DWC, F1ABHLVX, O2SAT, FIO2  Lab Results   Component Value Date/Time    SPECIAL NOT REPORTED 10/19/2021 09:30 PM     Lab Results   Component Value Date/Time    CULTURE ENTEROBACTER CLOACAE >[id number] CFU/ML (A) 10/19/2021 09:30 PM    CULTURE KLEBSIELLA OXYTOCA >981257 CFU/ML (A) 10/19/2021 09:30 PM       Radiology:  XR WRIST RIGHT (MIN 3 VIEWS)    Result Date: 10/19/2021  1. Severe proximal predominant degenerative changes at the carpus/wrist which can be seen with inflammatory arthropathy or gouty arthropathy. SLAC wrist deformity with widening of the scapholunate space and proximal migration of the capitate. 2. Mild osteoarthrosis at the [facility]. 3. Moderate soft tissue swelling and edema about the wrist. 4. No acute fracture evident. 5. Severe impingement of the distal radioulnar joint. XR CHEST PORTABLE    Result Date: 10/19/2021  Masslike right basilar opacity could represent infectious process in the appropriate clinical setting. However, a chest CT should be considered for further evaluation     CT CHEST PULMONARY EMBOLISM W CONTRAST    Result Date: 10/20/2021  No evidence of pulmonary embolism.  Large malignant neoplasm in the right lower lobe with extension to the ray and metastasis to the bilateral lungs, subcarinal lymph nodes, and possibly the left hilar lymph nodes. Trace left-sided layering pleural effusion. Physical Examination:     Physical Exam  Vitals and nursing note reviewed. HENT:      Mouth/Throat:      Mouth: Mucous membranes are moist.   Eyes:      Extraocular Movements: Extraocular movements intact. Cardiovascular:      Rate and Rhythm: Normal rate and regular rhythm. Pulses: Normal pulses. Heart sounds: Normal heart sounds. Pulmonary:      Effort: Pulmonary effort is normal.      Breath sounds: Normal breath sounds. Abdominal:      General: Bowel sounds are normal.      Palpations: Abdomen is soft. Musculoskeletal:         General: Tenderness (right wrist) present. Cervical back: Neck supple. Right lower leg: No edema. Left lower leg: No edema. Skin:     General: Skin is warm and dry. Capillary Refill: Capillary refill takes less than 2 seconds. Neurological:      Mental Status: She is alert and oriented to person, place, and time. GCS: GCS eye subscore is 4. GCS verbal subscore is 5. GCS motor subscore is 6. Psychiatric:         Mood and Affect: Mood normal.         Assessment:     Hospital Problems         Last Modified POA    * (Principal) UTI due to Klebsiella and Enterobacter 10/23/2021 Yes    Benign essential HTN 10/20/2021 Yes    Shortness of breath 10/20/2021 Yes    Gouty arthropathy 10/20/2021 Yes    Hyponatremia 10/20/2021 Yes    Urinary incontinence 10/20/2021 Yes    Weakness 10/20/2021 Yes    Acute cystitis without hematuria 10/23/2021 Yes    Obesity (BMI 30-39.9) 10/20/2021 Yes    Mass of right lung 10/20/2021 Yes    Malignant neoplasm of lung ([address] 10/22/2021 Yes          Plan:     Acute cystitis without hematuria  Rocephin changed to Cipro per sensitivities     Hypertension  Continue home lisinopril and monitor for hypertension.   Stop maxzide    Adding mag oxide po bid     Cardiology consulted to evaluate heart rhythm  Plan for Holter monitor and cardiology follow-up as outpatient     Hyponatremia  improving  Stop Maxzide and monitor    Plan Miralax and Doculax. Mucinex to help with congestion     Weakness  PT/OT to evaluate and treat  Patient would benefit from continued therapy. SNF placement recommended. Case management assisting with discharge planning     Continue home Plaquenil, Synthroid, methotrexate, Ditropan, PPI, and multivitamin.  resume Glucophage.  ncrease prednisone to 10 mg daily for now     Monitor and replace electrolytes as needed     DVT prophylaxis     Norco for pain     Patient encouraged to follow-up with her rheumatologist related to arthritis/joint swelling  She is encouraged to follow-up with Dr. Swapna Arias  She is also encouraged to follow-up with her PCP in 7 to 10 days    Case management assisting with discharge [address] [facility] [address], [place]  10/25/2021  11:27 AM

## 2021-10-25 NOTE — PROGRESS NOTES
Physical Therapy  Facility/Department: [facility] SURG ICU  Daily Treatment Note  NAME: Lawrence Moore  : 1940  MRN: [id number]    Date of Service: 10/25/2021    Discharge Recommendations:  Patient would benefit from continued therapy after discharge   PT Equipment Recommendations  Equipment Needed: No  Other: Pt reports she has a RW at home and advised she would be safer with its use at this time. Assessment   Body structures, Functions, Activity limitations: Decreased functional mobility ; Decreased endurance;Decreased ROM; Decreased strength;Decreased balance; Increased pain;Decreased safe awareness  Assessment: Pt quite fatigued this morning- requiring multiple short rest breaks throughout. Pt would be unsafe to return to her prior living arrangements without strict 24/ assistance secondary to her decreased balance. Pt will benefit from further therapy at discharge. Prognosis: Good  Patient Education: Hand placement with transfers  REQUIRES PT FOLLOW UP: Yes  Activity Tolerance  Activity Tolerance: Patient limited by fatigue;Patient limited by endurance     Patient Diagnosis(es): The primary encounter diagnosis was Urinary tract infection with hematuria, site unspecified. Diagnoses of Chronic pain of both shoulders, Mass of right lung, DDD (degenerative disc disease), lumbar, Disorder of bone and articular cartilage, Gouty arthropathy, Acquired spondylolisthesis, Lumbar stenosis with neurogenic claudication, Lumbar disc herniation with radiculopathy, and Localized primary osteoarthritis of right hand were also pertinent to this visit. has a past medical history of Arthritis, Asthma, Back pain, Caffeine use, Carpal tunnel syndrome, Constipation, Dizziness, Gout, Hearing loss, History of blood transfusion, Hypertension, Obesity, Osteoarthritis, PONV (postoperative nausea and vomiting), Sleep apnea, Thyroid disease, Urinary incontinence, Urine incontinence, and Wears hearing aid.    has a past surgical history that includes Tonsillectomy; Hysterectomy; knee surgery (Left); Foot surgery (Right); Cystoscopy; Cataract removal (Bilateral); joint replacement (Left, 2011); Knee arthroscopy (Left); Carpal tunnel release (Bilateral); Colonoscopy; Cataract removal with implant (Bilateral); and pr total knee arthroplasty (Right, 7/17/2018). Restrictions  Restrictions/Precautions  Restrictions/Precautions: Fall Risk, Up as Tolerated  Required Braces or Orthoses?: No  Implants present? : Metal implants  Position Activity Restriction  Other position/activity restrictions: Up with assist  Subjective   General  Response To Previous Treatment: Patient with no complaints from previous session. Family / Caregiver Present: No  Subjective  Subjective: Pt seated in restroom upon arrival- RN agreeable to therapy. Pt without pain complaints at rest.  Pain Screening  Patient Currently in Pain: No (at rest)  Vital Signs  Patient Currently in Pain: No (at rest)         Cognition   Cognition  Overall Cognitive Status: Exceptions  Arousal/Alertness: Appropriate responses to stimuli  Following Commands:  Follows multistep commands with increased time  Attention Span: Attends with cues to redirect  Safety Judgement: Decreased awareness of need for assistance;Decreased awareness of need for safety  Problem Solving: Assistance required to correct errors made;Decreased awareness of errors;Assistance required to identify errors made  Insights: Decreased awareness of deficits  Initiation: Requires cues for some  Sequencing: Requires cues for some  Objective   Bed mobility  Scooting: Contact guard assistance  Comment: Scooting assessed in chair- up in restroom upon arrival and retired to chair at end of session  Transfers  Sit to Stand: Minimal Assistance  Stand to sit: Minimal Assistance  Comment: from toilet and shower chair heights  Ambulation  Ambulation?: Yes  Ambulation 1  Surface: level tile  Device: Rolling Walker  Assistance: Contact guard assistance;Minimal assistance  Quality of Gait: decreased step length, decreased gait speed, forward flexed with cues, decreased endurance  Gait Deviations: Slow Mellissa;Decreased step length;Decreased step height  Distance: 6ft, seated rest break, 30ft  Stairs/Curb  Stairs?: No     Balance  Posture: Fair  Sitting - Static: Good  Sitting - Dynamic: Good;-  Standing - Static: Fair  Standing - Dynamic: Fair  Comments: standing balance assessed with RW           Seated LE exercise program: Long Arc Quads, hip abduction/adduction, heel/toe raises, and marches. Reps: 10 x2 sets                 AM-PAC Score  AM-PAC Inpatient Mobility Raw Score : 17 (10/25/21 0951)  AM-PAC Inpatient T-Scale Score : 42.13 (10/25/21 0951)  Mobility Inpatient CMS 0-100% Score: 50.57 (10/25/21 0951)  Mobility Inpatient CMS G-Code Modifier : CK (10/25/21 0951)          Goals  Short term goals  Time Frame for Short term goals: 14 visits  Short term goal 1: Pt to be Independent with bed mobility. Short term goal 2: Pt to tolerate 30 minutes of therapy activity to improve endurance for mobility. Short term goal 3: Pt to transfer Modified (I) without LOB. Short term goal 4: Pt to ambulate with appropriate device >90' Modified Independent without LOB for functional household ambulation. Short term goal 5: Pt to negotiate 2 steps with 1 rail Modified(I)-Supervision without LOB for entry into home.   Patient Goals   Patient goals : Return home    Plan    Plan  Times per week: 5-6x/week  Times per day: Daily  Current Treatment Recommendations: Strengthening, Transfer Training, Endurance Training, Patient/Caregiver Education & Training, Gait Training, Balance Training, Functional Mobility Training, Stair training, Safety Education & Training  Safety Devices  Type of devices: Call light within reach, Left in chair, Gait belt, Nurse notified, Chair alarm in place  Restraints  Initially in place: No     Therapy Time

## 2021-10-25 NOTE — PLAN OF CARE
Nutrition Problem #1: Inadequate oral intake  Intervention: Food and/or Nutrient Delivery: Continue Current Diet, Start Oral Nutrition Supplement  Nutritional Goals: PO intakes to meet >75% estimated needs.

## 2021-10-25 NOTE — PROGRESS NOTES
Report called and given to [address] [facility] [address] at [facility]..

## 2021-10-25 NOTE — CARE COORDINATION
Discharge [facility] Case Management Department  Written by: Cydney Senior RN    Patient Name: Juanito Gotti  Attending Provider: Kathie Jackson DO  Admit Date: 10/19/2021  7:15 PM  MRN: [id number]  Account: [de-identified]                     : 1940  Discharge Date: 10/25/2021      Disposition: SNF - [facility] - family to transport by private car at [facility].  AVS/LYDIA, script faxed and placed in d/c packet    Cydney Senior RN
Patient states she has her own Albuterol HFA and Symbicort medications. She prefers to keep her medications at bedside and use them. Instructed her to tell the RN or the Respiratory Therapist when she uses them with her spacer. RN informed.     Raven Elaine RRT
Patient states she took her own Albuteral and Symbicort inhalers this evening. Patient placed herself on her own DME CPAP unit with no supplemental oxygen blended into the circuit.     Miguel Delcid RRT
REDD and CNP met with patient to discuss discharge plans. Therapy recommending assistance at discharge and patient does not currently have support at home. Patient reported she is hesitant going to a SNF due to covid concerns. Patient would like to discuss information with her daughter. Patient requested to have SW look into [place]. Will send another referral as previously facility had no availability. 1430  REDD attempted to contact [facility], no answer. Per previous CM note [place] did not have availability yesterday. Followed up with Annette regarding Home Care referral. Referral currently being processed and intake will follow up. [address] 1826 with Tyler Nascimento at [place] [facility]. Will review information again and call back shortly. [address] received call from Tyler Nascimento at [facility]. Will submit for precert, should have bed available. Tyler Nascimento wanted to clarify that CBD oil would need to be discontinued. Also wanted to know follow up regarding pulmonology. Met with patient, agreeable to discontinuing CBD. Does not yet have follow up regarding biopsy scheduled with pulmonologist. [facility] left voicemail for Tyler Nascimento requesting return call.
VM message left with Betzaidaalok Annalisa @ [facility] in attempt to escalate the pre-cert for SNF level of care. Reference # [id number]  Await return call. @2616- Message left with admissions @ [facility] to check the status of the precert    @2974- Spoke with [facility] @ [facility]- still awaiting precert. VM message retrieved from [facility] [phone]- case is still pending. @0640- Still have not yet received precert[facility]. In the event pre-cert comes through today or tomorrow, Hens completed. Family to transport patient to SNF.
at [facility] - unable to accept d/t no bed availability    Patient updated that no bed available at [facility], states that she really would like to just go home and have therapy at home.  Will need to call Bertin Gutierrez in AM to see if they can accept

## 2021-10-25 NOTE — PLAN OF CARE
Problem: Falls - Risk of:  Goal: Will remain free from falls  Description: Will remain free from falls  10/25/2021 1136 by Susi Tiwari RN  Outcome: Completed  10/25/2021 1136 by Susi Tiwari RN  Outcome: Met This Shift  Goal: Absence of physical injury  Description: Absence of physical injury  10/25/2021 1136 by Susi Tiwari RN  Outcome: Completed  10/25/2021 1136 by Susi Tiwari RN  Outcome: Met This Shift     Problem: Skin Integrity:  Goal: Will show no infection signs and symptoms  Description: Will show no infection signs and symptoms  10/25/2021 1136 by Susi Tiwari RN  Outcome: Completed  10/25/2021 1136 by Susi Tiwari RN  Outcome: Met This Shift  Goal: Absence of new skin breakdown  Description: Absence of new skin breakdown  10/25/2021 1136 by Susi Tiwari RN  Outcome: Completed  10/25/2021 1136 by Susi Tiwari RN  Outcome: Met This Shift     Problem: Pain:  Goal: Pain level will decrease  Description: Pain level will decrease  10/25/2021 1136 by Susi Tiwari RN  Outcome: Completed  10/25/2021 1136 by Susi Tiwari RN  Outcome: Met This Shift  Goal: Control of acute pain  Description: Control of acute pain  10/25/2021 1136 by Susi Tiwari RN  Outcome: Completed  10/25/2021 1136 by Susi Tiwari RN  Outcome: Met This Shift  Goal: Control of chronic pain  Description: Control of chronic pain  10/25/2021 1136 by Susi Tiwari RN  Outcome: Completed  10/25/2021 1136 by Susi Tiwari RN  Outcome: Met This Shift     Problem: Nutrition  Goal: Optimal nutrition therapy  10/25/2021 1136 by Susi Tiwari RN  Outcome: Completed  10/25/2021 1136 by Susi Tiwari RN  Outcome: Met This Shift  10/25/2021 1034 by Nadeem Jiménez RD, LD  Outcome: Ongoing  Note: Nutrition Problem #1: Inadequate oral intake  Intervention: Food and/or Nutrient Delivery: Continue Current Diet, Start Oral Nutrition Supplement  Nutritional Goals: PO intakes to meet >75% estimated needs.        Problem: Musculor/Skeletal Functional Status  Goal: Highest potential functional level  10/25/2021 1136 by Socorro Paget, RN  Outcome: Completed  10/25/2021 1136 by Socorro Paget, RN  Outcome: Met This Shift  Goal: Absence of falls  10/25/2021 1136 by Socorro Paget, RN  Outcome: Completed  10/25/2021 1136 by Socorro Paget, RN  Outcome: Met This Shift     Problem: Sensory:  Goal: General experience of comfort will improve  Description: General experience of comfort will improve  10/25/2021 1136 by Socorro Paget, RN  Outcome: Completed  10/25/2021 1136 by Socorro Paget, RN  Outcome: Met This Shift     Problem: Urinary Elimination:  Goal: Signs and symptoms of infection will decrease  Description: Signs and symptoms of infection will decrease  10/25/2021 1136 by Socorro Paget, RN  Outcome: Completed  10/25/2021 1136 by Socorro Paget, RN  Outcome: Met This Shift  Goal: Ability to reestablish a normal urinary elimination pattern will improve - after catheter removal  Description: Ability to reestablish a normal urinary elimination pattern will improve  10/25/2021 1136 by Socorro Paget, RN  Outcome: Completed  10/25/2021 1136 by Socorro Paget, RN  Outcome: Met This Shift  Goal: Complications related to the disease process, condition or treatment will be avoided or minimized  Description: Complications related to the disease process, condition or treatment will be avoided or minimized  10/25/2021 1136 by Socorro Paget, RN  Outcome: Completed  10/25/2021 1136 by Socorro Paget, RN  Outcome: Met This Shift     Problem: OXYGENATION/RESPIRATORY FUNCTION  Goal: Patient will maintain patent airway  10/25/2021 1136 by Socorro Paget, RN  Outcome: Completed  10/25/2021 1136 by Socorro Paget, RN  Outcome: Met This Shift

## 2021-10-25 NOTE — PLAN OF CARE
Problem: Falls - Risk of:  Siderails up x 2  Hourly rounding  Call light in reach  Instructed to call for assist before attempting out of bed. Remains free from falls and accidental injury at this time   Floor free from obstacles  Bed is locked and in lowest position  Adequate lighting provided  Bed alarm on, Red Falling star and Stay with Me signs posted      Goal: Will remain free from falls  Description: Will remain free from falls  Outcome: Ongoing  Goal: Absence of physical injury  Description: Absence of physical injury  Outcome: Ongoing     Problem: Skin Integrity:  Checked for incontinence every 2 hours and prn. Pericare as needed. Assisted to reposition off back frequently. On waffle mattress. Heels off bed with pillows. Goal: Will show no infection signs and symptoms  Description: Will show no infection signs and symptoms  Outcome: Ongoing  Goal: Absence of new skin breakdown  Description: Absence of new skin breakdown  Outcome: Ongoing     Problem: Pain:  Pain level assessment complete.    Patient educated on pain scale and control interventions  PRN pain medication given per patient request  Patient instructed to call out with new onset of pain or unrelieved pain    Goal: Pain level will decrease  Description: Pain level will decrease  Outcome: Ongoing  Goal: Control of acute pain  Description: Control of acute pain  Outcome: Ongoing  Goal: Control of chronic pain  Description: Control of chronic pain  Outcome: Ongoing     Problem: Nutrition  Goal: Optimal nutrition therapy  Outcome: Ongoing     Problem: Musculor/Skeletal Functional Status  Goal: Highest potential functional level  Outcome: Ongoing  Goal: Absence of falls  Outcome: Ongoing     Problem: Sensory:  Goal: General experience of comfort will improve  Description: General experience of comfort will improve  Outcome: Ongoing     Problem: Urinary Elimination:  Goal: Signs and symptoms of infection will decrease  Description: Signs and symptoms of infection will decrease  Outcome: Ongoing  Goal: Ability to reestablish a normal urinary elimination pattern will improve - after catheter removal  Description: Ability to reestablish a normal urinary elimination pattern will improve  Outcome: Ongoing  Goal: Complications related to the disease process, condition or treatment will be avoided or minimized  Description: Complications related to the disease process, condition or treatment will be avoided or minimized  Outcome: Ongoing     Problem: OXYGENATION/RESPIRATORY FUNCTION  Goal: Patient will maintain patent airway  10/24/2021 2128 by Brennan Torres RN  Outcome: Ongoing  10/24/2021 1108 by Shane Hernandez RCP  Outcome: Ongoing

## 2021-10-25 NOTE — PROGRESS NOTES
Comprehensive Nutrition Assessment    Type and Reason for Visit:  Reassess    Nutrition Recommendations/Plan: Continue Current Diet, Start Oral Nutrition Supplement. Encourage PO Intake as tolerated. Will continue to monitor tolerance to diet and adequacy of intake. Nutrition Assessment:  Chart reviewed. Pt reports she is tolerating diet well, but doesn't have much of an appetite. C/o constipation x 1 week ;currently receiving bowel meds. Pt states she has not received any supplements with meal trays; writer will add to lunch tray today. Meds/labs reviewed. Estimated Daily Nutrient Needs:  Energy (kcal):  1750 kcal/day; Weight Used for Energy Requirements:  Current     Protein (g):  80 g pro/day; Weight Used for Protein Requirements:  Ideal (1.5)        Fluid (ml/day):  2547-6239 mL/day or per MD; Method Used for Fluid Requirements:  1 ml/kcal      Nutrition Related Findings:  +constipation; no BM x 1 week per pt report      Wounds:  None       Current Nutrition Therapies:    ADULT DIET; Regular; 1200 ml; No Caffeine    Anthropometric Measures:  · Height: 5' 3\" (160 cm)  · Current Body Weight: 224 lb 13.9 oz (102 kg)   · Admission Body Weight: 210 lb 5.1 oz (95.4 kg)    · Usual Body Weight: 233 lb (105.7 kg)     · Ideal Body Weight: 115 lbs; % Ideal Body Weight 195.5 %   · BMI: 39.8  · BMI Categories: Obese Class 2 (BMI 35.0 -39.9)       Nutrition Diagnosis:   · Inadequate oral intake related to decreased appetite, constipation as evidenced by intake 0-50%    Nutrition Interventions:   Food and/or Nutrient Delivery:  Continue Current Diet, Start Oral Nutrition Supplement  Nutrition Education/Counseling:  No recommendation at this time   Coordination of Nutrition Care:  Continue to monitor while inpatient    Goals:  PO intakes to meet >75% estimated needs. -progressing towards goal       Nutrition Monitoring and Evaluation:    Food/Nutrient Intake Outcomes:  Diet Advancement/Tolerance, Food and Nutrient Intake, Supplement Intake  Physical Signs/Symptoms Outcomes:  Biochemical Data, Constipation, Nutrition Focused Physical Findings, Skin, Weight     Discharge Planning:     Too soon to determine     Electronically signed by Kwasi Yoder RD, LD on 10/25/21 at 10:31 AM EDT

## 2021-11-12 ENCOUNTER — HOSPITAL ENCOUNTER (INPATIENT)
Age: 81
LOS: 5 days | Discharge: NURSING, REHAB OR LONG-TERM CARE | DRG: 683 | End: 2021-11-17
Attending: EMERGENCY MEDICINE
Payer: MEDICARE

## 2021-11-12 ENCOUNTER — APPOINTMENT (OUTPATIENT)
Dept: GENERAL RADIOLOGY | Age: 81
DRG: 683 | End: 2021-11-12
Payer: MEDICARE

## 2021-11-12 DIAGNOSIS — D72.829 LEUKOCYTOSIS, UNSPECIFIED TYPE: ICD-10-CM

## 2021-11-12 DIAGNOSIS — R79.89 ELEVATED LFTS: ICD-10-CM

## 2021-11-12 DIAGNOSIS — N17.9 AKI (ACUTE KIDNEY INJURY) (HCC): ICD-10-CM

## 2021-11-12 DIAGNOSIS — R53.1 GENERALIZED WEAKNESS: Primary | ICD-10-CM

## 2021-11-12 DIAGNOSIS — J18.9 PNEUMONIA OF BOTH LOWER LOBES DUE TO INFECTIOUS ORGANISM: ICD-10-CM

## 2021-11-12 LAB
ABSOLUTE EOS #: 0 K/UL (ref 0–0.4)
ABSOLUTE IMMATURE GRANULOCYTE: ABNORMAL K/UL (ref 0–0.3)
ABSOLUTE LYMPH #: 0.7 K/UL (ref 1–4.8)
ABSOLUTE MONO #: 0.7 K/UL (ref 0.1–1.2)
ALBUMIN SERPL-MCNC: 3.5 G/DL (ref 3.5–5.2)
ALBUMIN/GLOBULIN RATIO: 1.3 (ref 1–2.5)
ALP BLD-CCNC: 423 U/L (ref 35–104)
ALT SERPL-CCNC: 71 U/L (ref 5–33)
ANION GAP SERPL CALCULATED.3IONS-SCNC: 15 MMOL/L (ref 9–17)
AST SERPL-CCNC: 78 U/L
BASOPHILS # BLD: 0 % (ref 0–2)
BASOPHILS ABSOLUTE: 0 K/UL (ref 0–0.2)
BILIRUB SERPL-MCNC: 0.67 MG/DL (ref 0.3–1.2)
BILIRUBIN URINE: NEGATIVE
BUN BLDV-MCNC: 66 MG/DL (ref 8–23)
BUN/CREAT BLD: ABNORMAL (ref 9–20)
CALCIUM SERPL-MCNC: 10.3 MG/DL (ref 8.6–10.4)
CHLORIDE BLD-SCNC: 94 MMOL/L (ref 98–107)
CO2: 20 MMOL/L (ref 20–31)
COLOR: YELLOW
COMMENT UA: ABNORMAL
CREAT SERPL-MCNC: 1.89 MG/DL (ref 0.5–0.9)
DIFFERENTIAL TYPE: ABNORMAL
EOSINOPHILS RELATIVE PERCENT: 0 % (ref 1–4)
GFR AFRICAN AMERICAN: 31 ML/MIN
GFR NON-AFRICAN AMERICAN: 26 ML/MIN
GFR SERPL CREATININE-BSD FRML MDRD: ABNORMAL ML/MIN/{1.73_M2}
GFR SERPL CREATININE-BSD FRML MDRD: ABNORMAL ML/MIN/{1.73_M2}
GLUCOSE BLD-MCNC: 230 MG/DL (ref 70–99)
GLUCOSE URINE: ABNORMAL
HCT VFR BLD CALC: 29.6 % (ref 36–46)
HEMOGLOBIN: 9.7 G/DL (ref 12–16)
IMMATURE GRANULOCYTES: ABNORMAL %
INR BLD: 1.3
KETONES, URINE: NEGATIVE
LACTIC ACID, SEPSIS WHOLE BLOOD: ABNORMAL MMOL/L (ref 0.5–1.9)
LACTIC ACID, SEPSIS: 2.6 MMOL/L (ref 0.5–1.9)
LACTIC ACID: 3.1 MMOL/L (ref 0.5–2.2)
LEUKOCYTE ESTERASE, URINE: NEGATIVE
LYMPHOCYTES # BLD: 4 % (ref 24–44)
MCH RBC QN AUTO: 31.7 PG (ref 26–34)
MCHC RBC AUTO-ENTMCNC: 32.7 G/DL (ref 31–37)
MCV RBC AUTO: 97 FL (ref 80–100)
MONOCYTES # BLD: 3 % (ref 2–11)
NITRITE, URINE: NEGATIVE
NRBC AUTOMATED: ABNORMAL PER 100 WBC
PARTIAL THROMBOPLASTIN TIME: 30 SEC (ref 21.3–31.3)
PDW BLD-RTO: 17.9 % (ref 12.5–15.4)
PH UA: 6 (ref 5–8)
PLATELET # BLD: 447 K/UL (ref 140–450)
PLATELET ESTIMATE: ABNORMAL
PMV BLD AUTO: 8.8 FL (ref 6–12)
POTASSIUM SERPL-SCNC: 4.3 MMOL/L (ref 3.7–5.3)
PROTEIN UA: NEGATIVE
PROTHROMBIN TIME: 13.2 SEC (ref 9.4–12.6)
RBC # BLD: 3.06 M/UL (ref 4–5.2)
RBC # BLD: ABNORMAL 10*6/UL
SARS-COV-2, RAPID: NOT DETECTED
SEG NEUTROPHILS: 93 % (ref 36–66)
SEGMENTED NEUTROPHILS ABSOLUTE COUNT: 19.8 K/UL (ref 1.8–7.7)
SODIUM BLD-SCNC: 129 MMOL/L (ref 135–144)
SPECIFIC GRAVITY UA: 1.01 (ref 1–1.03)
SPECIMEN DESCRIPTION: NORMAL
TOTAL PROTEIN: 6.1 G/DL (ref 6.4–8.3)
TROPONIN INTERP: ABNORMAL
TROPONIN T: ABNORMAL NG/ML
TROPONIN, HIGH SENSITIVITY: 24 NG/L (ref 0–14)
TURBIDITY: CLEAR
URINE HGB: NEGATIVE
UROBILINOGEN, URINE: NORMAL
WBC # BLD: 21.3 K/UL (ref 3.5–11)
WBC # BLD: ABNORMAL 10*3/UL

## 2021-11-12 PROCEDURE — 36415 COLL VENOUS BLD VENIPUNCTURE: CPT

## 2021-11-12 PROCEDURE — 85025 COMPLETE CBC W/AUTO DIFF WBC: CPT

## 2021-11-12 PROCEDURE — 84484 ASSAY OF TROPONIN QUANT: CPT

## 2021-11-12 PROCEDURE — 71045 X-RAY EXAM CHEST 1 VIEW: CPT

## 2021-11-12 PROCEDURE — 2060000000 HC ICU INTERMEDIATE R&B

## 2021-11-12 PROCEDURE — 99283 EMERGENCY DEPT VISIT LOW MDM: CPT

## 2021-11-12 PROCEDURE — 93005 ELECTROCARDIOGRAM TRACING: CPT | Performed by: PHYSICIAN ASSISTANT

## 2021-11-12 PROCEDURE — 2580000003 HC RX 258: Performed by: SPECIALIST

## 2021-11-12 PROCEDURE — 2580000003 HC RX 258: Performed by: PHYSICIAN ASSISTANT

## 2021-11-12 PROCEDURE — 85730 THROMBOPLASTIN TIME PARTIAL: CPT

## 2021-11-12 PROCEDURE — 81003 URINALYSIS AUTO W/O SCOPE: CPT

## 2021-11-12 PROCEDURE — 80053 COMPREHEN METABOLIC PANEL: CPT

## 2021-11-12 PROCEDURE — 87040 BLOOD CULTURE FOR BACTERIA: CPT

## 2021-11-12 PROCEDURE — 83605 ASSAY OF LACTIC ACID: CPT

## 2021-11-12 PROCEDURE — 87635 SARS-COV-2 COVID-19 AMP PRB: CPT

## 2021-11-12 PROCEDURE — 87899 AGENT NOS ASSAY W/OPTIC: CPT

## 2021-11-12 PROCEDURE — 6360000002 HC RX W HCPCS: Performed by: NURSE PRACTITIONER

## 2021-11-12 PROCEDURE — 85610 PROTHROMBIN TIME: CPT

## 2021-11-12 PROCEDURE — 6360000002 HC RX W HCPCS: Performed by: PHYSICIAN ASSISTANT

## 2021-11-12 RX ORDER — LEVOFLOXACIN 5 MG/ML
500 INJECTION, SOLUTION INTRAVENOUS EVERY 24 HOURS
Status: DISCONTINUED | OUTPATIENT
Start: 2021-11-12 | End: 2021-11-12

## 2021-11-12 RX ORDER — SODIUM CHLORIDE 9 MG/ML
INJECTION, SOLUTION INTRAVENOUS CONTINUOUS
Status: DISCONTINUED | OUTPATIENT
Start: 2021-11-12 | End: 2021-11-13

## 2021-11-12 RX ORDER — M-VIT,TX,IRON,MINS/CALC/FOLIC 27MG-0.4MG
1 TABLET ORAL DAILY
Status: DISCONTINUED | OUTPATIENT
Start: 2021-11-13 | End: 2021-11-17 | Stop reason: HOSPADM

## 2021-11-12 RX ORDER — VITAMIN B COMPLEX
2000 TABLET ORAL DAILY
Status: DISCONTINUED | OUTPATIENT
Start: 2021-11-13 | End: 2021-11-17 | Stop reason: HOSPADM

## 2021-11-12 RX ORDER — AMOXICILLIN 500 MG
1200 CAPSULE ORAL DAILY
Status: DISCONTINUED | OUTPATIENT
Start: 2021-11-13 | End: 2021-11-12

## 2021-11-12 RX ORDER — HYDROXYCHLOROQUINE SULFATE 200 MG/1
200 TABLET, FILM COATED ORAL 2 TIMES DAILY
Status: DISCONTINUED | OUTPATIENT
Start: 2021-11-12 | End: 2021-11-17 | Stop reason: HOSPADM

## 2021-11-12 RX ORDER — FOLIC ACID 1 MG/1
2 TABLET ORAL NIGHTLY
Status: DISCONTINUED | OUTPATIENT
Start: 2021-11-12 | End: 2021-11-17 | Stop reason: HOSPADM

## 2021-11-12 RX ORDER — BUDESONIDE AND FORMOTEROL FUMARATE DIHYDRATE 160; 4.5 UG/1; UG/1
2 AEROSOL RESPIRATORY (INHALATION) 2 TIMES DAILY
Status: DISCONTINUED | OUTPATIENT
Start: 2021-11-12 | End: 2021-11-17 | Stop reason: HOSPADM

## 2021-11-12 RX ORDER — TRAMADOL HYDROCHLORIDE 50 MG/1
50 TABLET ORAL EVERY 8 HOURS PRN
Status: DISCONTINUED | OUTPATIENT
Start: 2021-11-12 | End: 2021-11-17 | Stop reason: HOSPADM

## 2021-11-12 RX ORDER — 0.9 % SODIUM CHLORIDE 0.9 %
500 INTRAVENOUS SOLUTION INTRAVENOUS ONCE
Status: COMPLETED | OUTPATIENT
Start: 2021-11-12 | End: 2021-11-13

## 2021-11-12 RX ORDER — UBIDECARENONE/VIT E ACET 100MG-5
1 CAPSULE ORAL DAILY
Status: DISCONTINUED | OUTPATIENT
Start: 2021-11-13 | End: 2021-11-12

## 2021-11-12 RX ORDER — ACETAMINOPHEN 325 MG/1
650 TABLET ORAL EVERY 6 HOURS PRN
Status: DISCONTINUED | OUTPATIENT
Start: 2021-11-12 | End: 2021-11-13

## 2021-11-12 RX ORDER — CETIRIZINE HYDROCHLORIDE 10 MG/1
10 TABLET ORAL DAILY
Status: DISCONTINUED | OUTPATIENT
Start: 2021-11-13 | End: 2021-11-13

## 2021-11-12 RX ORDER — ALLOPURINOL 300 MG/1
300 TABLET ORAL DAILY
Status: DISCONTINUED | OUTPATIENT
Start: 2021-11-13 | End: 2021-11-17 | Stop reason: HOSPADM

## 2021-11-12 RX ORDER — PANTOPRAZOLE SODIUM 40 MG/1
40 TABLET, DELAYED RELEASE ORAL
Status: DISCONTINUED | OUTPATIENT
Start: 2021-11-13 | End: 2021-11-17 | Stop reason: HOSPADM

## 2021-11-12 RX ORDER — SODIUM POLYSTYRENE SULFONATE 15 G/60ML
15 SUSPENSION ORAL; RECTAL
Status: ACTIVE | OUTPATIENT
Start: 2021-11-12 | End: 2021-11-12

## 2021-11-12 RX ORDER — ASCORBIC ACID 500 MG
500 TABLET ORAL DAILY
Status: DISCONTINUED | OUTPATIENT
Start: 2021-11-13 | End: 2021-11-17 | Stop reason: HOSPADM

## 2021-11-12 RX ORDER — TRAMADOL HYDROCHLORIDE 50 MG/1
50 TABLET ORAL EVERY 8 HOURS PRN
COMMUNITY

## 2021-11-12 RX ORDER — VITAMIN B COMPLEX
2 TABLET ORAL DAILY
Status: DISCONTINUED | OUTPATIENT
Start: 2021-11-13 | End: 2021-11-12

## 2021-11-12 RX ORDER — ONDANSETRON 2 MG/ML
4 INJECTION INTRAMUSCULAR; INTRAVENOUS EVERY 6 HOURS PRN
Status: DISCONTINUED | OUTPATIENT
Start: 2021-11-12 | End: 2021-11-17 | Stop reason: HOSPADM

## 2021-11-12 RX ORDER — SODIUM CHLORIDE 9 MG/ML
INJECTION, SOLUTION INTRAVENOUS CONTINUOUS
Status: DISCONTINUED | OUTPATIENT
Start: 2021-11-12 | End: 2021-11-15

## 2021-11-12 RX ORDER — SODIUM POLYSTYRENE SULFONATE 15 G/60ML
30 SUSPENSION ORAL; RECTAL
Status: ACTIVE | OUTPATIENT
Start: 2021-11-12 | End: 2021-11-12

## 2021-11-12 RX ORDER — AMPICILLIN TRIHYDRATE 250 MG
500 CAPSULE ORAL DAILY
Status: DISCONTINUED | OUTPATIENT
Start: 2021-11-13 | End: 2021-11-12

## 2021-11-12 RX ORDER — LEVOFLOXACIN 5 MG/ML
500 INJECTION, SOLUTION INTRAVENOUS ONCE
Status: COMPLETED | OUTPATIENT
Start: 2021-11-13 | End: 2021-11-13

## 2021-11-12 RX ORDER — LEVOFLOXACIN 5 MG/ML
250 INJECTION, SOLUTION INTRAVENOUS EVERY 24 HOURS
Status: DISCONTINUED | OUTPATIENT
Start: 2021-11-14 | End: 2021-11-16

## 2021-11-12 RX ORDER — SODIUM CHLORIDE 9 MG/ML
25 INJECTION, SOLUTION INTRAVENOUS PRN
Status: DISCONTINUED | OUTPATIENT
Start: 2021-11-12 | End: 2021-11-17 | Stop reason: HOSPADM

## 2021-11-12 RX ORDER — VITAMIN E 268 MG
400 CAPSULE ORAL DAILY
Status: DISCONTINUED | OUTPATIENT
Start: 2021-11-13 | End: 2021-11-17 | Stop reason: HOSPADM

## 2021-11-12 RX ORDER — HYDROCORTISONE ACETATE 0.5 %
1 CREAM (GRAM) TOPICAL DAILY
Status: DISCONTINUED | OUTPATIENT
Start: 2021-11-13 | End: 2021-11-12

## 2021-11-12 RX ORDER — ALBUTEROL SULFATE 90 UG/1
2 AEROSOL, METERED RESPIRATORY (INHALATION) 2 TIMES DAILY
Status: DISCONTINUED | OUTPATIENT
Start: 2021-11-12 | End: 2021-11-17 | Stop reason: HOSPADM

## 2021-11-12 RX ORDER — LEVOTHYROXINE SODIUM 88 UG/1
88 TABLET ORAL DAILY
Status: DISCONTINUED | OUTPATIENT
Start: 2021-11-13 | End: 2021-11-17 | Stop reason: HOSPADM

## 2021-11-12 RX ORDER — ONDANSETRON 4 MG/1
4 TABLET, ORALLY DISINTEGRATING ORAL EVERY 8 HOURS PRN
Status: DISCONTINUED | OUTPATIENT
Start: 2021-11-12 | End: 2021-11-17 | Stop reason: HOSPADM

## 2021-11-12 RX ORDER — CALCIUM CARBONATE 500(1250)
500 TABLET ORAL 2 TIMES DAILY
Status: DISCONTINUED | OUTPATIENT
Start: 2021-11-12 | End: 2021-11-17 | Stop reason: HOSPADM

## 2021-11-12 RX ORDER — DEXAMETHASONE SODIUM PHOSPHATE 4 MG/ML
4 INJECTION, SOLUTION INTRA-ARTICULAR; INTRALESIONAL; INTRAMUSCULAR; INTRAVENOUS; SOFT TISSUE 4 TIMES DAILY
Status: DISCONTINUED | OUTPATIENT
Start: 2021-11-12 | End: 2021-11-17 | Stop reason: HOSPADM

## 2021-11-12 RX ORDER — ACETAMINOPHEN 650 MG/1
650 SUPPOSITORY RECTAL EVERY 6 HOURS PRN
Status: DISCONTINUED | OUTPATIENT
Start: 2021-11-12 | End: 2021-11-13

## 2021-11-12 RX ORDER — SODIUM CHLORIDE 0.9 % (FLUSH) 0.9 %
5-40 SYRINGE (ML) INJECTION PRN
Status: DISCONTINUED | OUTPATIENT
Start: 2021-11-12 | End: 2021-11-17 | Stop reason: HOSPADM

## 2021-11-12 RX ORDER — B12/LEVOMEFOLATE CALCIUM/B-6 2-1.13-25
1 TABLET ORAL DAILY
Status: DISCONTINUED | OUTPATIENT
Start: 2021-11-13 | End: 2021-11-17 | Stop reason: HOSPADM

## 2021-11-12 RX ORDER — PREDNISONE 10 MG/1
5 TABLET ORAL DAILY
Status: DISCONTINUED | OUTPATIENT
Start: 2021-11-13 | End: 2021-11-12

## 2021-11-12 RX ORDER — NIACINAMIDE 500 MG
1500 TABLET, EXTENDED RELEASE ORAL 2 TIMES DAILY
Status: DISCONTINUED | OUTPATIENT
Start: 2021-11-12 | End: 2021-11-17 | Stop reason: HOSPADM

## 2021-11-12 RX ORDER — ASPIRIN 81 MG/1
81 TABLET, CHEWABLE ORAL DAILY
Status: DISCONTINUED | OUTPATIENT
Start: 2021-11-13 | End: 2021-11-17 | Stop reason: HOSPADM

## 2021-11-12 RX ORDER — SODIUM CHLORIDE 0.9 % (FLUSH) 0.9 %
5-40 SYRINGE (ML) INJECTION EVERY 12 HOURS SCHEDULED
Status: DISCONTINUED | OUTPATIENT
Start: 2021-11-12 | End: 2021-11-17 | Stop reason: HOSPADM

## 2021-11-12 RX ORDER — DIPHENHYDRAMINE HCL 25 MG
25 TABLET ORAL NIGHTLY PRN
Status: DISCONTINUED | OUTPATIENT
Start: 2021-11-13 | End: 2021-11-13

## 2021-11-12 RX ADMIN — CEFTRIAXONE SODIUM 1000 MG: 1 INJECTION, POWDER, FOR SOLUTION INTRAMUSCULAR; INTRAVENOUS at 22:55

## 2021-11-12 RX ADMIN — SODIUM CHLORIDE 500 ML: 9 INJECTION, SOLUTION INTRAVENOUS at 23:58

## 2021-11-12 RX ADMIN — VANCOMYCIN HYDROCHLORIDE 1000 MG: 1 INJECTION, POWDER, LYOPHILIZED, FOR SOLUTION INTRAVENOUS at 21:37

## 2021-11-12 RX ADMIN — DEXAMETHASONE SODIUM PHOSPHATE 4 MG: 4 INJECTION, SOLUTION INTRAMUSCULAR; INTRAVENOUS at 23:58

## 2021-11-12 RX ADMIN — SODIUM CHLORIDE: 9 INJECTION, SOLUTION INTRAVENOUS at 20:18

## 2021-11-12 ASSESSMENT — PAIN SCALES - GENERAL: PAINLEVEL_OUTOF10: 0

## 2021-11-13 PROBLEM — C79.31 MALIGNANT MELANOMA METASTATIC TO BRAIN (HCC): Status: ACTIVE | Noted: 2021-11-13

## 2021-11-13 PROBLEM — D72.829 LEUKOCYTOSIS: Status: ACTIVE | Noted: 2021-11-13

## 2021-11-13 PROBLEM — J18.9 ATYPICAL PNEUMONIA: Status: ACTIVE | Noted: 2021-11-13

## 2021-11-13 PROBLEM — C43.9 MALIGNANT MELANOMA (HCC): Status: ACTIVE | Noted: 2021-11-13

## 2021-11-13 LAB
ALBUMIN SERPL-MCNC: 3 G/DL (ref 3.5–5.2)
ALBUMIN/GLOBULIN RATIO: 1.3 (ref 1–2.5)
ALP BLD-CCNC: 354 U/L (ref 35–104)
ALT SERPL-CCNC: 68 U/L (ref 5–33)
ANION GAP SERPL CALCULATED.3IONS-SCNC: 14 MMOL/L (ref 9–17)
AST SERPL-CCNC: 80 U/L
BILIRUB SERPL-MCNC: 0.56 MG/DL (ref 0.3–1.2)
BUN BLDV-MCNC: 57 MG/DL (ref 8–23)
BUN/CREAT BLD: ABNORMAL (ref 9–20)
CALCIUM SERPL-MCNC: 9.6 MG/DL (ref 8.6–10.4)
CHLORIDE BLD-SCNC: 99 MMOL/L (ref 98–107)
CO2: 20 MMOL/L (ref 20–31)
CREAT SERPL-MCNC: 1.52 MG/DL (ref 0.5–0.9)
EKG ATRIAL RATE: 73 BPM
EKG P AXIS: 73 DEGREES
EKG P-R INTERVAL: 144 MS
EKG Q-T INTERVAL: 406 MS
EKG QRS DURATION: 92 MS
EKG QTC CALCULATION (BAZETT): 447 MS
EKG R AXIS: -17 DEGREES
EKG T AXIS: 39 DEGREES
EKG VENTRICULAR RATE: 73 BPM
FOLATE: >20 NG/ML
GFR AFRICAN AMERICAN: 40 ML/MIN
GFR NON-AFRICAN AMERICAN: 33 ML/MIN
GFR SERPL CREATININE-BSD FRML MDRD: ABNORMAL ML/MIN/{1.73_M2}
GFR SERPL CREATININE-BSD FRML MDRD: ABNORMAL ML/MIN/{1.73_M2}
GLUCOSE BLD-MCNC: 219 MG/DL (ref 70–99)
HCT VFR BLD CALC: 27.4 % (ref 36–46)
HEMOGLOBIN: 9 G/DL (ref 12–16)
INR BLD: 1.3
LACTIC ACID, WHOLE BLOOD: ABNORMAL MMOL/L (ref 0.7–2.1)
LACTIC ACID: 3.4 MMOL/L (ref 0.5–2.2)
MAGNESIUM: 1.2 MG/DL (ref 1.6–2.6)
MCH RBC QN AUTO: 32.2 PG (ref 26–34)
MCHC RBC AUTO-ENTMCNC: 32.8 G/DL (ref 31–37)
MCV RBC AUTO: 98.4 FL (ref 80–100)
NRBC AUTOMATED: ABNORMAL PER 100 WBC
PDW BLD-RTO: 18.1 % (ref 12.5–15.4)
PLATELET # BLD: 386 K/UL (ref 140–450)
PMV BLD AUTO: 9 FL (ref 6–12)
POTASSIUM SERPL-SCNC: 4.2 MMOL/L (ref 3.7–5.3)
PROCALCITONIN: 0.54 NG/ML
PROTHROMBIN TIME: 13.1 SEC (ref 9.4–12.6)
RBC # BLD: 2.79 M/UL (ref 4–5.2)
SODIUM BLD-SCNC: 133 MMOL/L (ref 135–144)
TOTAL PROTEIN: 5.4 G/DL (ref 6.4–8.3)
TSH SERPL DL<=0.05 MIU/L-ACNC: 0.79 MIU/L (ref 0.3–5)
VITAMIN B-12: >2000 PG/ML (ref 232–1245)
VITAMIN D 25-HYDROXY: 41.6 NG/ML (ref 30–100)
WBC # BLD: 19.2 K/UL (ref 3.5–11)

## 2021-11-13 PROCEDURE — 82306 VITAMIN D 25 HYDROXY: CPT

## 2021-11-13 PROCEDURE — 80053 COMPREHEN METABOLIC PANEL: CPT

## 2021-11-13 PROCEDURE — 99223 1ST HOSP IP/OBS HIGH 75: CPT | Performed by: INTERNAL MEDICINE

## 2021-11-13 PROCEDURE — 85610 PROTHROMBIN TIME: CPT

## 2021-11-13 PROCEDURE — APPSS45 APP SPLIT SHARED TIME 31-45 MINUTES: Performed by: NURSE PRACTITIONER

## 2021-11-13 PROCEDURE — 83735 ASSAY OF MAGNESIUM: CPT

## 2021-11-13 PROCEDURE — 2060000000 HC ICU INTERMEDIATE R&B

## 2021-11-13 PROCEDURE — 2580000003 HC RX 258: Performed by: NURSE PRACTITIONER

## 2021-11-13 PROCEDURE — 82607 VITAMIN B-12: CPT

## 2021-11-13 PROCEDURE — 84145 PROCALCITONIN (PCT): CPT

## 2021-11-13 PROCEDURE — 94664 DEMO&/EVAL PT USE INHALER: CPT

## 2021-11-13 PROCEDURE — 86738 MYCOPLASMA ANTIBODY: CPT

## 2021-11-13 PROCEDURE — 83605 ASSAY OF LACTIC ACID: CPT

## 2021-11-13 PROCEDURE — 85027 COMPLETE CBC AUTOMATED: CPT

## 2021-11-13 PROCEDURE — 6370000000 HC RX 637 (ALT 250 FOR IP): Performed by: NURSE PRACTITIONER

## 2021-11-13 PROCEDURE — 83036 HEMOGLOBIN GLYCOSYLATED A1C: CPT

## 2021-11-13 PROCEDURE — 36415 COLL VENOUS BLD VENIPUNCTURE: CPT

## 2021-11-13 PROCEDURE — 6360000002 HC RX W HCPCS: Performed by: NURSE PRACTITIONER

## 2021-11-13 PROCEDURE — 94640 AIRWAY INHALATION TREATMENT: CPT

## 2021-11-13 PROCEDURE — 84443 ASSAY THYROID STIM HORMONE: CPT

## 2021-11-13 PROCEDURE — 82746 ASSAY OF FOLIC ACID SERUM: CPT

## 2021-11-13 PROCEDURE — 94760 N-INVAS EAR/PLS OXIMETRY 1: CPT

## 2021-11-13 RX ORDER — LANOLIN ALCOHOL/MO/W.PET/CERES
3 CREAM (GRAM) TOPICAL NIGHTLY PRN
Status: DISCONTINUED | OUTPATIENT
Start: 2021-11-13 | End: 2021-11-17 | Stop reason: HOSPADM

## 2021-11-13 RX ORDER — MAGNESIUM SULFATE 1 G/100ML
1000 INJECTION INTRAVENOUS PRN
Status: DISCONTINUED | OUTPATIENT
Start: 2021-11-13 | End: 2021-11-17 | Stop reason: HOSPADM

## 2021-11-13 RX ORDER — CETIRIZINE HYDROCHLORIDE 10 MG/1
10 TABLET ORAL DAILY PRN
Status: DISCONTINUED | OUTPATIENT
Start: 2021-11-13 | End: 2021-11-17 | Stop reason: HOSPADM

## 2021-11-13 RX ORDER — HEPARIN SODIUM 5000 [USP'U]/ML
5000 INJECTION, SOLUTION INTRAVENOUS; SUBCUTANEOUS EVERY 8 HOURS SCHEDULED
Status: DISCONTINUED | OUTPATIENT
Start: 2021-11-14 | End: 2021-11-17 | Stop reason: HOSPADM

## 2021-11-13 RX ADMIN — LEVOFLOXACIN 500 MG: 5 INJECTION, SOLUTION INTRAVENOUS at 01:01

## 2021-11-13 RX ADMIN — TRAMADOL HYDROCHLORIDE 50 MG: 50 TABLET, COATED ORAL at 08:40

## 2021-11-13 RX ADMIN — HYDROXYCHLOROQUINE SULFATE 200 MG: 200 TABLET, FILM COATED ORAL at 20:44

## 2021-11-13 RX ADMIN — BUDESONIDE AND FORMOTEROL FUMARATE DIHYDRATE 2 PUFF: 160; 4.5 AEROSOL RESPIRATORY (INHALATION) at 21:17

## 2021-11-13 RX ADMIN — CETIRIZINE HYDROCHLORIDE 10 MG: 10 TABLET, FILM COATED ORAL at 08:34

## 2021-11-13 RX ADMIN — SODIUM CHLORIDE, PRESERVATIVE FREE 10 ML: 5 INJECTION INTRAVENOUS at 20:47

## 2021-11-13 RX ADMIN — SODIUM CHLORIDE, PRESERVATIVE FREE 10 ML: 5 INJECTION INTRAVENOUS at 08:35

## 2021-11-13 RX ADMIN — MULTIPLE VITAMINS W/ MINERALS TAB 1 TABLET: TAB at 08:34

## 2021-11-13 RX ADMIN — ALBUTEROL SULFATE 2 PUFF: 90 AEROSOL, METERED RESPIRATORY (INHALATION) at 21:17

## 2021-11-13 RX ADMIN — PANTOPRAZOLE SODIUM 40 MG: 40 TABLET, DELAYED RELEASE ORAL at 06:51

## 2021-11-13 RX ADMIN — FOLIC ACID 2 MG: 1 TABLET ORAL at 00:50

## 2021-11-13 RX ADMIN — FOLIC ACID 2 MG: 1 TABLET ORAL at 20:44

## 2021-11-13 RX ADMIN — DEXAMETHASONE SODIUM PHOSPHATE 4 MG: 4 INJECTION, SOLUTION INTRAMUSCULAR; INTRAVENOUS at 17:29

## 2021-11-13 RX ADMIN — OXYCODONE HYDROCHLORIDE AND ACETAMINOPHEN 500 MG: 500 TABLET ORAL at 08:34

## 2021-11-13 RX ADMIN — DEXAMETHASONE SODIUM PHOSPHATE 4 MG: 4 INJECTION, SOLUTION INTRAMUSCULAR; INTRAVENOUS at 14:15

## 2021-11-13 RX ADMIN — DEXAMETHASONE SODIUM PHOSPHATE 4 MG: 4 INJECTION, SOLUTION INTRAMUSCULAR; INTRAVENOUS at 20:45

## 2021-11-13 RX ADMIN — LEVOTHYROXINE SODIUM 88 MCG: 88 TABLET ORAL at 05:03

## 2021-11-13 RX ADMIN — SODIUM CHLORIDE, PRESERVATIVE FREE 10 ML: 5 INJECTION INTRAVENOUS at 01:16

## 2021-11-13 RX ADMIN — CALCIUM 500 MG: 500 TABLET ORAL at 08:34

## 2021-11-13 RX ADMIN — CALCIUM 500 MG: 500 TABLET ORAL at 00:50

## 2021-11-13 RX ADMIN — Medication 2000 UNITS: at 08:35

## 2021-11-13 RX ADMIN — ALLOPURINOL 300 MG: 300 TABLET ORAL at 08:34

## 2021-11-13 RX ADMIN — ENOXAPARIN SODIUM 30 MG: 30 INJECTION SUBCUTANEOUS at 08:35

## 2021-11-13 RX ADMIN — ASPIRIN 81 MG: 81 TABLET, CHEWABLE ORAL at 08:35

## 2021-11-13 RX ADMIN — HYDROXYCHLOROQUINE SULFATE 200 MG: 200 TABLET, FILM COATED ORAL at 00:50

## 2021-11-13 RX ADMIN — DEXAMETHASONE SODIUM PHOSPHATE 4 MG: 4 INJECTION, SOLUTION INTRAMUSCULAR; INTRAVENOUS at 08:35

## 2021-11-13 RX ADMIN — HYDROXYCHLOROQUINE SULFATE 200 MG: 200 TABLET, FILM COATED ORAL at 08:35

## 2021-11-13 RX ADMIN — SODIUM CHLORIDE: 9 INJECTION, SOLUTION INTRAVENOUS at 01:17

## 2021-11-13 RX ADMIN — TRAMADOL HYDROCHLORIDE 50 MG: 50 TABLET, COATED ORAL at 22:20

## 2021-11-13 ASSESSMENT — PAIN - FUNCTIONAL ASSESSMENT
PAIN_FUNCTIONAL_ASSESSMENT: PREVENTS OR INTERFERES SOME ACTIVE ACTIVITIES AND ADLS
PAIN_FUNCTIONAL_ASSESSMENT: ACTIVITIES ARE NOT PREVENTED

## 2021-11-13 ASSESSMENT — PAIN DESCRIPTION - PAIN TYPE
TYPE: ACUTE PAIN
TYPE: ACUTE PAIN

## 2021-11-13 ASSESSMENT — PAIN DESCRIPTION - PROGRESSION

## 2021-11-13 ASSESSMENT — PAIN DESCRIPTION - ONSET: ONSET: GRADUAL

## 2021-11-13 ASSESSMENT — PAIN SCALES - GENERAL
PAINLEVEL_OUTOF10: 7
PAINLEVEL_OUTOF10: 0
PAINLEVEL_OUTOF10: 3
PAINLEVEL_OUTOF10: 0
PAINLEVEL_OUTOF10: 6

## 2021-11-13 ASSESSMENT — PAIN DESCRIPTION - FREQUENCY
FREQUENCY: INTERMITTENT
FREQUENCY: INTERMITTENT

## 2021-11-13 ASSESSMENT — PAIN DESCRIPTION - LOCATION
LOCATION: SHOULDER
LOCATION: GENERALIZED

## 2021-11-13 ASSESSMENT — PAIN DESCRIPTION - DESCRIPTORS
DESCRIPTORS: ACHING
DESCRIPTORS: ACHING;DISCOMFORT

## 2021-11-13 ASSESSMENT — ENCOUNTER SYMPTOMS
EYES NEGATIVE: 1
RESPIRATORY NEGATIVE: 1
GASTROINTESTINAL NEGATIVE: 1

## 2021-11-13 ASSESSMENT — PAIN DESCRIPTION - ORIENTATION: ORIENTATION: RIGHT

## 2021-11-13 NOTE — PROGRESS NOTES
PHARMACY NOTE:    Jr Spivey was ordered CoQ10. As per the [facility], herbals and certain dietary supplements will be discontinued. The herbal or dietary supplement may be continued after discharge from the hospital.      Dell CINTRON    11/12/2021  11:24 PM

## 2021-11-13 NOTE — ED PROVIDER NOTES
[id number] [facility] ED  [address] [facility] [place] [address]  Phone: [phone]  Fax: Sol Garcia [phone]      Pt Name: Heath Machado  MRN: [id number]  [place] 1940  Date of evaluation: 11/12/2021  Provider: Kishan Powell Dr       Chief Complaint   Patient presents with    Fatigue     pt was admitted to hospital in october- rehab s/p hospitalization, was d/c from [place] 2 days ago- having increased fatigue            HISTORY OF PRESENT ILLNESS  (Location/Symptom, Timing/Onset, Context/Setting, Quality, Duration, Modifying Factors, Severity.)   Heath Machado is a [de-identified] y.o. female who presents to the emergency department for evaluation of generalized fatigue/weakness after being discharge home from [place] [address] at [place] after 7 days stay there and previous 6 day stay in [facility] ([facility]). Family staying with pt but unable to manage her needs- can't ambulate safely along/needs assistance, can't toilet w/o assistance, unable to prepare food for herself or taker her medicines. 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 No f/c/n/v/chest-resp-abd pain or urination. She reports some mild cough recently but no SOB/DOMINGUEZ/chest-pleuritic pain. Nursing Notes were reviewed. REVIEW OF SYSTEMS    (2-9 systems for level 4, 10 or more for level 5)     Review of Systems   Constitutional: Negative. HENT: Negative. Eyes: Negative. Respiratory: Negative. Cardiovascular: Negative. Gastrointestinal: Negative. Musculoskeletal: Negative. Endocrine: Negative. Genitourinary: Negative. Skin: Negative. Allergic/Immunologic: Negative. Neurological: Negative. Hematological: Negative. Psychiatric/Behavioral: Negative. Except as noted above the remainder of the review of systems was reviewed and negative.        PAST MEDICAL HISTORY   History reviewed. Past Medical History:   Diagnosis Date    Arthritis     Asthma     Back pain     Caffeine use     2-3 cups coffee and 2-3 cups tea and 1-2 cans pop daily    Carpal tunnel syndrome     Constipation     Dizziness     Gout     Hearing loss     History of blood transfusion     Hypertension     Obesity     Osteoarthritis     PONV (postoperative nausea and vomiting)     Sleep apnea     Thyroid disease     HYPOTHYROID    Urinary incontinence     Urine incontinence     Wears hearing aid     BILATERAL         SURGICAL HISTORY     History reviewed.     Past Surgical History:   Procedure Laterality Date    CARPAL TUNNEL RELEASE Bilateral     CATARACT REMOVAL Bilateral     CATARACT REMOVAL WITH IMPLANT Bilateral     COLONOSCOPY      CYSTOSCOPY      and dilation    FOOT SURGERY Right     HYSTERECTOMY      JOINT REPLACEMENT Left 2011    TKA    KNEE ARTHROSCOPY Left     KNEE SURGERY Left     DE TOTAL KNEE ARTHROPLASTY Right 7/17/2018    KNEE TOTAL ARTHROPLASTY performed by Evy Todd MD at [address] [facility]       Previous Medications    ALBUTEROL SULFATE HFA (VENTOLIN HFA) 108 (90 BASE) MCG/ACT INHALER    Inhale 2 puffs into the lungs 2 times daily    ALLOPURINOL (ZYLOPRIM) 300 MG TABLET    Take 300 mg by mouth daily    ASPIRIN 81 MG CHEWABLE TABLET    Take 81 mg by mouth daily    B COMPLEX VITAMINS CAPSULE    Take 1 capsule by mouth daily    BUDESONIDE-FORMOTEROL (SYMBICORT) 160-4.5 MCG/ACT AERO    Inhale 2 puffs into the lungs 2 times daily    CALCIUM CARBONATE (OSCAL) 500 MG TABS TABLET    Take 500 mg by mouth 2 times daily    CETIRIZINE (ZYRTEC) 10 MG TABLET    Take 10 mg by mouth daily    CHOLECALCIFEROL (VITAMIN D) 2000 UNITS CAPS CAPSULE    Take 1 capsule by mouth daily     CINNAMON 500 MG CAPS    Take 500 mg by mouth daily     COENZYME Q10 (COQ10) 100 MG CAPS    Take 2 capsules by mouth daily     CYANOCOBALAMIN (VITAMIN B12 PO)    Take by mouth once a week On thursdays    DEXAMETHASONE (DECADRON) 1 MG/ML SOLUTION    Take 4 mg by mouth 4 times daily Takes every 6 hours, 0500, 1100, 1700, 2300    DIPHENHYDRAMINE (BENADRYL) 25 MG CAPSULE    Take 25 mg by mouth nightly as needed     FOLIC ACID PO    Take 2 mg by mouth nightly     GLUCOSAMINE-CHONDROIT-VIT C-MN (GLUCOSAMINE 1500 COMPLEX) CAPS    Take 1 capsule by mouth daily 1/2 in AM, 1/2 in PM    HYDROCORTISONE 2.5 % CREAM    Apply topically 2 times daily Apply topically 2 times daily. HYDROXYCHLOROQUINE (PLAQUENIL) 200 MG TABLET    Take 200 mg by mouth 2 times daily    LEVOTHYROXINE (SYNTHROID) 88 MCG TABLET    Take 88 mcg by mouth Daily    LISINOPRIL (PRINIVIL;ZESTRIL) 10 MG TABLET    Take 5 mg by mouth daily     METHOTREXATE (RHEUMATREX) 2.5 MG CHEMO TABLET    Take 15 mg by mouth once a week At bedtime on thursday    MULTIPLE VITAMINS-MINERALS (THERAPEUTIC MULTIVITAMIN-MINERALS) TABLET    Take 1 tablet by mouth daily    NIACINAMIDE 500 MG TBCR    Take 1,500 mg by mouth 2 times daily    OMEGA-3 FATTY ACIDS (FISH OIL PO)    Take 1,200 mg by mouth Omega 3 360mg    OMEPRAZOLE (PRILOSEC) 20 MG DELAYED RELEASE CAPSULE    Take 40 mg by mouth 2 times daily     ONDANSETRON (ZOFRAN ODT) 4 MG DISINTEGRATING TABLET    Take 1 tablet by mouth every 8 hours as needed for Nausea    POLYETHYLENE GLYCOL (GLYCOLAX) PACKET    Take 17 g by mouth daily as needed for Constipation    PREDNISONE (DELTASONE) 5 MG TABLET    Take 5 mg by mouth daily    RESVERATROL 100 MG CAPS    Take 1 capsule by mouth daily    TRAMADOL (ULTRAM) 50 MG TABLET    Take 50 mg by mouth every 8 hours as needed for Pain.  Takes at 0900, 1700, 0100    TRIAMTERENE-HYDROCHLOROTHIAZIDE (MAXZIDE-25) 37.5-25 MG PER TABLET    Take 1 tablet by mouth daily    TURMERIC PO    Take 720 mg by mouth 2 times daily    VITAMIN C (ASCORBIC ACID) 500 MG TABLET    Take 500 mg by mouth daily    VITAMIN D PO    Take 2,000 % by mouth every morning    VITAMIN E 400 UNIT CAPSULE    Take 400 Units by mouth daily       ALLERGIES     Povidone-iodine, Hydrochlorothiazide, Meperidine, and Naproxen    FAMILY HISTORY           Problem Relation Age of Onset    Heart Failure Mother     Heart Disease Father      Family Status   Relation Name Status    Mother      Father            SOCIAL HISTORY      reports that she quit smoking about 46 years ago. She has a 4.25 pack-year smoking history. She has never used smokeless tobacco. She reports current alcohol use. She reports that she does not use drugs. lives at home with other     PHYSICAL EXAM    (up to 7 for level 4, 8 or more for level 5)     ED Triage Vitals [21 1815]   BP Temp Temp Source Pulse Resp SpO2 Height Weight   (!) 117/59 98.2 °F (36.8 °C) Oral 71 18 96 % 5' 3\" (1.6 m) 208 lb (94.3 kg)       Physical Exam   Nursing note and vitals reviewed. Constitutional:   Well-developed and well-nourished. No lethargy. Nontoxic. Head: Normocephalic and atraumatic. Ear: External ears normal.   Nose: Nose normal and midline. Eyes: Conjunctivae and EOM are normal. Pupils are equal/round  Neck: Normal range of motion. Oral/Throat: Posterior pharynx wnl, Airway is patent  Cardiovascular: Normal rate, regular rhythm, normal heart sounds   Pulmonary/Chest: Effort normal and breath sounds normal. No wheezes/rales/rhonchi. Abdominal: Soft. Bowel sounds are normal. No distension or obvious mass/herniation. No TTP. Musculoskeletal: Normal to inspection. NV intact x 4. Neurological: Alert, age appropriate mentation and interaction. Skin: Skin is warm and dry. No rash noted.    Psychiatric: Mood, memory, affect and judgment normal.       DIAGNOSTIC RESULTS     EKG: All EKG's are interpreted by the Emergency Department Physician who either signs or Co-signs this chart in the absence of a cardiologist.    Not indicated OR per attending note    RADIOLOGY:   Non-plain film images such as CT, Ultrasound and MRI are read by the radiologist. [place] radiographic images are visualized and preliminarily interpreted by the emergency physician with the below findings:      Interpretation per the Radiologist below, if available at the time of this note:    XR CHEST PORTABLE   Final Result   Right lower lobe mass again noted      Peripheral bilateral airspace opacities could represent atypical pneumonia                 LABS:  Labs Reviewed   CBC WITH AUTO DIFFERENTIAL - Abnormal; Notable for the following components:       Result Value    WBC 21.3 (*)     RBC 3.06 (*)     Hemoglobin 9.7 (*)     Hematocrit 29.6 (*)     RDW 17.9 (*)     Seg Neutrophils 93 (*)     Lymphocytes 4 (*)     Eosinophils % 0 (*)     Segs Absolute 19.80 (*)     Absolute Lymph # 0.70 (*)     All other components within normal limits   COMPREHENSIVE METABOLIC PANEL W/ REFLEX TO MG FOR LOW K - Abnormal; Notable for the following components:    Glucose 230 (*)     BUN 66 (*)     CREATININE 1.89 (*)     Sodium 129 (*)     Chloride 94 (*)     Alkaline Phosphatase 423 (*)     ALT 71 (*)     AST 78 (*)     Total Protein 6.1 (*)     GFR Non- 26 (*)     GFR  31 (*)     All other components within normal limits   URINE RT REFLEX TO CULTURE - Abnormal; Notable for the following components:    Glucose, Ur TRACE (*)     All other components within normal limits   CULTURE, BLOOD 1   CULTURE, BLOOD 1   COVID-19, RAPID   LACTATE, SEPSIS   LACTIC ACID   PROTIME-INR   APTT   TROPONIN         All other labs were within normal range or not returned as of this dictation. EMERGENCY DEPARTMENT COURSE and DIFFERENTIAL DIAGNOSIS/MDM:   Vitals:    Vitals:    11/12/21 1815   BP: (!) 117/59   Pulse: 71   Resp: 18   Temp: 98.2 °F (36.8 °C)   TempSrc: Oral   SpO2: 96%   Weight: 94.3 kg (208 lb)   Height: 5' 3\" (1.6 m)       1858  Labs ordered.   Pt is going to require assistance with living, family staying with her can't manage as pt needs help with all ambulating/transfers and toileting. Can't get into kitchen to prepare food for herself. No overt new pain. Some mild cough. 2104  Treating for pneumonia here. Discussed pt with [facility]/CNP with [facility] who is agreeable to admit. Pt does confirm she is on steroids at present so that would account likely for her hyperglycemia and leukocytosis but some bilat pneumonia read on CXR. LFT elevation could very well represent hepatic mets with her diffuse mets history per daughter. She is agreeable to admit here. CONSULTS:  None    PROCEDURES:  None    Patient instructed to return to the emergency room if symptoms worsen, return, or any other concern right away which is agreed. FINAL IMPRESSION      1. Generalized weakness    2. Pneumonia of both lower lobes due to infectious organism    3. Leukocytosis, unspecified type    4. Elevated LFTs    5. PAOLA (acute kidney injury) ([facility] [address]            DISPOSITION/PLAN   DISPOSITION Decision To Admit    CONDITION:  Stable    PATIENT REFERRED TO:  No follow-up provider specified.     DISCHARGE MEDICATIONS:  New Prescriptions    No medications on file       (Please note that portions of this note were completed with a voice recognition program.  Efforts were made to edit the dictations but occasionally words are mis-transcribed.)    REI Schultz PA-C  11/12/21 2108       Rohan Amanda PA-C  11/14/21 1119

## 2021-11-13 NOTE — PROGRESS NOTES
PHARMACY NOTE:    Hai Pedersen was ordered fish oil. As per the [facility], herbals and certain dietary supplements will be discontinued. The herbal or dietary supplement may be continued after discharge from the hospital.      Michal Carrel. SAIDA    11/12/2021  11:27 PM

## 2021-11-13 NOTE — H&P
[facility]  Office: [address], DO, Clark Lozoya, DO, Jerald Santo, DO, Lindsey Inman Blood, DO, Yovany Pickering MD, Belkis Uriarte MD, Tim Mcelroy MD, Fidelia Clemons MD, Ronal Wakefield MD, Yousif Solis MD, Yaron Wood MD, Devan Galvez, DO, Lillian Lane, DO, Padmaja Sheets MD,  Bekah Barnes DO, Jimmy Rollins MD, Dell Cabrera MD, Rahul Arriaga MD, Elissa Mares MD, Woo Sanford MD, Niles Foreman MD, July Bush MD, Roel Tejada, [facility], [facility], CNP, Jhoana Faye, CNP, Tripp Michel, CNS, Ailene Lesches, CNP, Janessa Matos, CNP, Yesi Saravia, CNP, Chloe France, CNP, Marilyn Hunt, CNP, Didier Catrer PA-C, Sera Chand, [facility], Lang Vizcaino, CNP, Pamela Ennis, CNP, Jose Alberto Tillman, CNP, Jyoti Hoang, CNP, Cm Mauricio, CNP, Lynda Reyes, CNP, Geremias Dunaway, [facility]   [address] [facility]    HISTORY AND PHYSICAL EXAMINATION            Date:   11/13/2021  Patient name:  Eric Romano  Date of admission:  11/12/2021  6:14 PM  MRN:   [id number]  Account:  [de-identified]  YOB: 1940  PCP:    Wilber Ramírez  Room:   334/334-  Code Status:    Full Code    Chief Complaint:     Chief Complaint   Patient presents with    Fatigue     pt was admitted to hospital in october- rehab s/p hospitalization, was d/c from [place] 2 days ago- having increased fatigue        History Obtained From:     patient    History of Present Illness:     Eric Romano is a [de-identified] y.o. Non- / non  female who presents with Fatigue (pt was admitted to hospital in october- rehab s/p hospitalization, was d/c from [place] 2 days ago- having increased fatigue )   and is admitted to the hospital for the management of PAOLA (acute kidney injury) ([facility] [address] Patient reports he he was previously hospitalized in October for weakness. She had a UTI and atrial flutter episode at that time.   She then went to [facility] for acute rehab and then to assisted living. She was discharged home a couple days ago and she was experiencing increased weakness in her hands and was unable to ambulate well. She has been able to bear weight on her legs, but not move them. 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 She sees Dr. Dalton Dorsey for oncology. According to the patient, she is supposed to start brain radiation next Friday for a total of 10 treatments. She says her oncologist wanted her to come to the hospital so that she could be admitted and then discharged to a SNF for further strengthening. While in ED, noted the patient's creatinine was 1.89, sodium 129, alk phos 423, ALT 71, AST 78. She was admitted to the inpatient nursing unit for generalized weakness, PAOLA, suspected pneumonia seen on imaging, transaminitis, and leukocytosis (patient has been taking oral Decadron). She is afebrile.     Past Medical History:     Past Medical History:   Diagnosis Date    Arthritis     Asthma     Back pain     Caffeine use     2-3 cups coffee and 2-3 cups tea and 1-2 cans pop daily    Carpal tunnel syndrome     Constipation     Cystitis     Dizziness     Gout     Hearing loss     History of blood transfusion     Hypertension     Malignant melanoma ([address]     Meralgia paresthetica     Mitral valve regurgitation     Obesity     Osteoarthritis     Osteoporosis     PONV (postoperative nausea and vomiting)     Sleep apnea     Thyroid disease     HYPOTHYROID    Urinary incontinence     Venous insufficiency     Vertigo     Weakness     Wears hearing aid     BILATERAL        Past Surgical History:     Past Surgical History:   Procedure Laterality Date    CARPAL TUNNEL RELEASE Bilateral     CATARACT REMOVAL Bilateral     CATARACT REMOVAL WITH IMPLANT Bilateral     COLONOSCOPY  CYSTOSCOPY      and dilation    FOOT SURGERY Right     HYSTERECTOMY      JOINT REPLACEMENT Left 2011    TKA    KNEE ARTHROSCOPY Left     KNEE SURGERY Left     MA TOTAL KNEE ARTHROPLASTY Right 7/17/2018    KNEE TOTAL ARTHROPLASTY performed by Anselmo Ly MD at [facility]          Medications Prior to Admission:     Prior to Admission medications    Medication Sig Start Date End Date Taking? Authorizing Provider   traMADol (ULTRAM) 50 MG tablet Take 50 mg by mouth every 8 hours as needed for Pain.  Takes at 0900, 1700, 0100   Yes Historical Provider, MD   dexamethasone (DECADRON) 1 MG/ML solution Take 4 mg by mouth 4 times daily Takes every 6 hours, 0500, 1100, 1700, 2300   Yes Historical Provider, MD   triamterene-hydroCHLOROthiazide (MAXZIDE-25) 37.5-25 MG per tablet Take 1 tablet by mouth daily 10/24/21  Yes COBY Tejeda - SUSSY   budesonide-formoterol (SYMBICORT) 160-4.5 MCG/ACT AERO Inhale 2 puffs into the lungs 2 times daily   Yes Historical Provider, MD   albuterol sulfate HFA (VENTOLIN HFA) 108 (90 Base) MCG/ACT inhaler Inhale 2 puffs into the lungs 2 times daily   Yes Historical Provider, MD   aspirin 81 MG chewable tablet Take 81 mg by mouth daily   Yes Historical Provider, MD   Omega-3 Fatty Acids (FISH OIL PO) Take 1,200 mg by mouth Omega 3 360mg   Yes Historical Provider, MD   FOLIC ACID PO Take 2 mg by mouth nightly    Yes Historical Provider, MD   lisinopril (PRINIVIL;ZESTRIL) 10 MG tablet Take 5 mg by mouth daily    Yes Historical Provider, MD   methotrexate (RHEUMATREX) 2.5 MG chemo tablet Take 15 mg by mouth once a week At bedtime on thursday   Yes Historical Provider, MD   Cyanocobalamin (VITAMIN B12 PO) Take by mouth once a week On thursdays   Yes Historical Provider, MD   VITAMIN D PO Take 2,000 % by mouth every morning   Yes Historical Provider, MD   hydroxychloroquine (PLAQUENIL) 200 MG tablet Take 200 mg by mouth 2 times daily   Yes Historical Provider, MD Cinnamon 500 MG CAPS Take 500 mg by mouth daily    Yes Historical Provider, MD   b complex vitamins capsule Take 1 capsule by mouth daily   Yes Historical Provider, MD   TURMERIC PO Take 720 mg by mouth 2 times daily   Yes Historical Provider, MD   vitamin C (ASCORBIC ACID) 500 MG tablet Take 500 mg by mouth daily   Yes Historical Provider, MD   Cholecalciferol (VITAMIN D) 2000 units CAPS capsule Take 1 capsule by mouth daily    Yes Historical Provider, MD   vitamin E 400 UNIT capsule Take 400 Units by mouth daily   Yes Historical Provider, MD   calcium carbonate (OSCAL) 500 MG TABS tablet Take 500 mg by mouth 2 times daily   Yes Historical Provider, MD   Multiple Vitamins-Minerals (THERAPEUTIC MULTIVITAMIN-MINERALS) tablet Take 1 tablet by mouth daily   Yes Historical Provider, MD   Glucosamine-Chondroit-Vit C-Mn (GLUCOSAMINE 1500 COMPLEX) CAPS Take 1 capsule by mouth daily 1/2 in AM, 1/2 in PM   Yes Historical Provider, MD   Coenzyme Q10 (COQ10) 100 MG CAPS Take 2 capsules by mouth daily    Yes Historical Provider, MD   Resveratrol 100 MG CAPS Take 1 capsule by mouth daily   Yes Historical Provider, MD   Niacinamide 500 MG TBCR Take 1,500 mg by mouth 2 times daily   Yes Historical Provider, MD   ondansetron (ZOFRAN ODT) 4 MG disintegrating tablet Take 1 tablet by mouth every 8 hours as needed for Nausea 2/7/19  Yes Joelle Denver, MD   hydrocortisone 2.5 % cream Apply topically 2 times daily Apply topically 2 times daily.    Yes Historical Provider, MD   cetirizine (ZYRTEC) 10 MG tablet Take 10 mg by mouth daily   Yes Historical Provider, MD   levothyroxine (SYNTHROID) 88 MCG tablet Take 88 mcg by mouth Daily   Yes Historical Provider, MD   omeprazole (PRILOSEC) 20 MG delayed release capsule Take 40 mg by mouth 2 times daily    Yes Historical Provider, MD   polyethylene glycol (GLYCOLAX) packet Take 17 g by mouth daily as needed for Constipation   Yes Historical Provider, MD   diphenhydrAMINE (BENADRYL) 25 MG capsule Take 25 mg by mouth nightly as needed    Yes Historical Provider, MD   allopurinol (ZYLOPRIM) 300 MG tablet Take 300 mg by mouth daily   Yes Historical Provider, MD   predniSONE (DELTASONE) 5 MG tablet Take 5 mg by mouth daily    Historical Provider, MD        Allergies:     Povidone-iodine, Hydrochlorothiazide, Meperidine, and Naproxen    Social History:     Tobacco:    reports that she quit smoking about 46 years ago. She has a 4.25 pack-year smoking history. She has never used smokeless tobacco.  Alcohol:      reports current alcohol use. Drug Use:  reports no history of drug use. Family History:     Family History   Problem Relation Age of Onset    Heart Failure Mother     Heart Disease Father        Review of Systems:     Positive and Negative as described in HPI. Review of Systems   Constitutional: Positive for activity change. Negative for appetite change, chills, fatigue and fever. HENT: Negative. Eyes: Negative. Respiratory: Negative. Cardiovascular: Negative. Gastrointestinal: Negative. Genitourinary: Negative. Musculoskeletal: Positive for arthralgias and gait problem. Skin: Negative. Neurological: Positive for weakness. Negative for dizziness, speech difficulty, light-headedness, numbness and headaches. Psychiatric/Behavioral: Negative. Physical Exam:   /60   Pulse 72   Temp 97.6 °F (36.4 °C) (Oral)   Resp 18   Ht 5' 3\" (1.6 m)   Wt 203 lb 4.2 oz (92.2 kg)   SpO2 95%   BMI 36.01 kg/m²   Temp (24hrs), Av.8 °F (36.6 °C), Min:97.6 °F (36.4 °C), Max:98.2 °F (36.8 °C)    No results for input(s): POCGLU in the last 72 hours. Intake/Output Summary (Last 24 hours) at 2021 1118  Last data filed at 2021 1030  Gross per 24 hour   Intake 150 ml   Output 350 ml   Net -200 ml       Physical Exam  Vitals and nursing note reviewed. Constitutional:       General: She is not in acute distress.      Appearance: She is not ill-appearing, toxic-appearing or diaphoretic. HENT:      Head: Normocephalic. Right Ear: External ear normal.      Left Ear: External ear normal.      Nose: Nose normal. No rhinorrhea. Mouth/Throat:      Mouth: Mucous membranes are moist.   Eyes:      General: No scleral icterus. Right eye: No discharge. Left eye: No discharge. Extraocular Movements: Extraocular movements intact. Conjunctiva/sclera: Conjunctivae normal.      Pupils: Pupils are equal, round, and reactive to light. Neck:      Comments: No JVD  Cardiovascular:      Rate and Rhythm: Normal rate and regular rhythm. Pulses: Normal pulses. Heart sounds: Normal heart sounds. No murmur heard. No friction rub. No gallop. Pulmonary:      Effort: Pulmonary effort is normal. No respiratory distress. Breath sounds: Normal breath sounds. No wheezing, rhonchi or rales. Abdominal:      General: Bowel sounds are normal. There is no distension. Palpations: Abdomen is soft. Tenderness: There is no abdominal tenderness. There is no guarding. Hernia: No hernia is present. Musculoskeletal:         General: Tenderness present. Right shoulder: Decreased range of motion. Left shoulder: Decreased range of motion. Right hand: No swelling. Normal range of motion. Decreased strength. Normal sensation. Normal pulse. Left hand: No swelling. Normal range of motion. Decreased strength. Normal sensation. Normal pulse. Cervical back: Normal range of motion and neck supple. Right lower leg: No edema. Left lower leg: No edema. Skin:     General: Skin is warm and dry. Coloration: Skin is not jaundiced. Findings: No bruising, erythema or lesion. Neurological:      General: No focal deficit present. Mental Status: She is alert and oriented to person, place, and time.    Psychiatric:         Attention and Perception: Attention normal.         Mood and Affect: Mood and affect 6.4 - 8.3 g/dL    Albumin 3.5 3.5 - 5.2 g/dL    Albumin/Globulin Ratio 1.3 1.0 - 2.5    GFR Non-African American 26 (L) >60 mL/min    GFR  31 (L) >60 mL/min    GFR Comment          GFR Staging NOT REPORTED    Lactic Acid    Collection Time: 11/12/21  7:22 PM   Result Value Ref Range    Lactic Acid 3.1 (H) 0.5 - 2.2 mmol/L   Protime-INR    Collection Time: 11/12/21  7:22 PM   Result Value Ref Range    Protime 13.2 (H) 9.4 - 12.6 sec    INR 1.3    APTT    Collection Time: 11/12/21  7:22 PM   Result Value Ref Range    PTT 30.0 21.3 - 31.3 sec   Troponin    Collection Time: 11/12/21  7:22 PM   Result Value Ref Range    Troponin, High Sensitivity 24 (H) 0 - 14 ng/L    Troponin T NOT REPORTED <0.03 ng/mL    Troponin Interp NOT REPORTED    Urinalysis Reflex to Culture    Collection Time: 11/12/21  8:00 PM    Specimen: Urine, clean catch   Result Value Ref Range    Color, UA Yellow Yellow    Turbidity UA Clear Clear    Glucose, Ur TRACE (A) NEGATIVE    Bilirubin Urine NEGATIVE NEGATIVE    Ketones, Urine NEGATIVE NEGATIVE    Specific Gravity, UA 1.015 1.005 - 1.030    Urine Hgb NEGATIVE NEGATIVE    pH, UA 6.0 5.0 - 8.0    Protein, UA NEGATIVE NEGATIVE    Urobilinogen, Urine Normal Normal    Nitrite, Urine NEGATIVE NEGATIVE    Leukocyte Esterase, Urine NEGATIVE NEGATIVE    Urinalysis Comments       Microscopic exam not performed based on chemical results unless requested in original order. Urinalysis Comments          Urinalysis Comments       Utilizing a urinalysis as the only screening method to exclude a potential uropathogen can be unreliable in many patient populations. Rapid screening tests are less sensitive than culture and if UTI is a clinical possibility, culture should be considered despite a negative urinalysis.    EKG 12 Lead    Collection Time: 11/12/21  8:08 PM   Result Value Ref Range    Ventricular Rate 73 BPM    Atrial Rate 73 BPM    P-R Interval 144 ms    QRS Duration 92 ms    Q-T Interval 406 ms    QTc Calculation (Bazett) 447 ms    P Axis 73 degrees    R Axis -17 degrees    T Axis 39 degrees   COVID-19, Rapid    Collection Time: 11/12/21  8:39 PM    Specimen: Nasopharyngeal Swab   Result Value Ref Range    Specimen Description . NASOPHARYNGEAL SWAB     SARS-CoV-2, Rapid Not Detected Not Detected   Culture, Blood 1    Collection Time: 11/12/21  8:44 PM    Specimen: Blood   Result Value Ref Range    Specimen Description . BLOOD     Special Requests 20MLS RIGHT ARM     Culture NO GROWTH 9 HOURS    Culture, Blood 1    Collection Time: 11/12/21  8:47 PM    Specimen: Blood   Result Value Ref Range    Specimen Description . BLOOD     Special Requests 20MLS LEFT ARM IV START     Culture NO GROWTH 9 HOURS    Lactate, Sepsis    Collection Time: 11/12/21 10:41 PM   Result Value Ref Range    Lactic Acid, Sepsis 2.6 (H) 0.5 - 1.9 mmol/L    Lactic Acid, Sepsis, Whole Blood NOT REPORTED 0.5 - 1.9 mmol/L   Comprehensive Metabolic Panel w/ Reflex to MG    Collection Time: 11/13/21  6:14 AM   Result Value Ref Range    Glucose 219 (H) 70 - 99 mg/dL    BUN 57 (H) 8 - 23 mg/dL    CREATININE 1.52 (H) 0.50 - 0.90 mg/dL    Bun/Cre Ratio NOT REPORTED 9 - 20    Calcium 9.6 8.6 - 10.4 mg/dL    Sodium 133 (L) 135 - 144 mmol/L    Potassium 4.2 3.7 - 5.3 mmol/L    Chloride 99 98 - 107 mmol/L    CO2 20 20 - 31 mmol/L    Anion Gap 14 9 - 17 mmol/L    Alkaline Phosphatase 354 (H) 35 - 104 U/L    ALT 68 (H) 5 - 33 U/L    AST 80 (H) <32 U/L    Total Bilirubin 0.56 0.3 - 1.2 mg/dL    Total Protein 5.4 (L) 6.4 - 8.3 g/dL    Albumin 3.0 (L) 3.5 - 5.2 g/dL    Albumin/Globulin Ratio 1.3 1.0 - 2.5    GFR Non- 33 (L) >60 mL/min    GFR African American 40 (L) >60 mL/min    GFR Comment          GFR Staging NOT REPORTED    Magnesium    Collection Time: 11/13/21  6:14 AM   Result Value Ref Range    Magnesium 1.2 (L) 1.6 - 2.6 mg/dL   CBC    Collection Time: 11/13/21  6:14 AM   Result Value Ref Range    WBC 19.2 (H) 3.5 - 11.0 k/uL    RBC 2.79 (L) 4.0 - 5.2 m/uL    Hemoglobin 9.0 (L) 12.0 - 16.0 g/dL    Hematocrit 27.4 (L) 36 - 46 %    MCV 98.4 80 - 100 fL    MCH 32.2 26 - 34 pg    MCHC 32.8 31 - 37 g/dL    RDW 18.1 (H) 12.5 - 15.4 %    Platelets 927 179 - 111 k/uL    MPV 9.0 6.0 - 12.0 fL    NRBC Automated NOT REPORTED per 100 WBC   Protime-INR    Collection Time: 11/13/21  6:14 AM   Result Value Ref Range    Protime 13.1 (H) 9.4 - 12.6 sec    INR 1.3    TSH with Reflex    Collection Time: 11/13/21  6:14 AM   Result Value Ref Range    TSH 0.79 0.30 - 5.00 mIU/L       Imaging/Diagnostics:    XR CHEST PORTABLE    Result Date: 11/12/2021  Right lower lobe mass again noted Peripheral bilateral airspace opacities could represent atypical pneumonia       Assessment :      Hospital Problems           Last Modified POA    * (Principal) PAOLA (acute kidney injury) ([facility] [address] 11/13/2021 Yes    Anemia 11/13/2021 Yes    Benign essential HTN 11/13/2021 Yes    Chronic renal impairment 11/13/2021 Yes    Weakness 11/13/2021 Yes    Obesity (BMI 30-39.9) 11/13/2021 Yes    Malignant neoplasm of lung ([facility] [address] 11/13/2021 Yes    Malignant melanoma metastatic to brain ([facility] [address] 11/13/2021 Yes    Atypical pneumonia 11/13/2021 Yes    Leukocytosis 11/13/2021 Yes          Plan:     Patient status inpatient in the Med/Surge    PAOLA: IV hydration as ordered. Daily CMP. Trend renal function. Avoid nephrotoxic agents. Obtain renal ultrasound. Avoid hypotension. Hold home lisinopril. Anemia: CBC with differential in AM.  Check vitamin E58 folic acid levels. Essential hypertension: Monitor vital signs as ordered. Blood pressure reasonably controlled at this time. Hold lisinopril  Weakness: Check vitamin B12 and vitamin D levels. PT/OT eval and treat. Fall precautions. Obesity: Monitor intake and output. Daily weights. Weight loss management as outpatient per PCP when medically appropriate.   Malignant melanoma with metastasis to lung and brain: Monitor respiratory status and mentation. Continue Decadron every 6 hours as ordered. Atypical pneumonia: Check procalcitonin, respiratory viral panel, Legionella, strep, mycoplasma. Continue IV renally dosed Levaquin for now. Leukocytosis: Monitor for fever. Patient noted to be on steroid. CBC with differential in a.m. UA remarkable for UTI. Elevated liver enzymes: Avoid hepatotoxic agents. Discontinue Tylenol. IV hydration. Trend liver enzymes. Patient denies abdominal pain, nausea, vomiting. DNR CC arrest patient request    Consultations:   IP CONSULT TO CASE MANAGEMENT     Patient is admitted as inpatient status because of co-morbidities listed above, severity of signs and symptoms as outlined, requirement for current medical therapies and most importantly because of direct risk to patient if care not provided in a hospital setting. Expected length of stay > 48 hours.     COBY Stevens NP  11/13/2021  11:18 AM    Copy sent to Dr. Bain Shape

## 2021-11-13 NOTE — PROGRESS NOTES
PHARMACY NOTE:    Yajaira Lopez was ordered  Resversatrol. As per the [address], herbals and certain dietary supplements will be discontinued. The herbal or dietary supplement may be continued after discharge from the hospital.        Janelle CINTRON    11/12/2021  11:21 PM

## 2021-11-13 NOTE — ED PROVIDER NOTES
Emergency Department     Faculty Attestation    I performed a history and physical examination of the patient and discussed management with the mid level provideer. I reviewed the mid level provider's note and agree with the documented findings and plan of care. Any areas of disagreement are noted on the chart. I was personally present for the key portions of any procedures. I have documented in the chart those procedures where I was not present during the key portions. I have reviewed the emergency nurses triage note. I agree with the chief complaint, past medical history, past surgical history, allergies, medications, social and family history as documented unless otherwise noted below. Documentation of the HPI, Physical Exam and Medical Decision Making performed by medical students or scribes is based on my personal performance of the HPI, PE and MDM. For Physician Assistant/ Nurse Practitioner cases/documentation I have personally evaluated this patient and have completed at least one if not all key elements of the E/M (history, physical exam, and MDM). Additional findings are as noted.       Primary Care Physician:  Navdeep       Chief Complaint   Patient presents with    Fatigue     pt was admitted to hospital in october- rehab s/p hospitalization, was d/c from [place] 2 days ago- having increased fatigue        RECENT VITALS:   Temp: 98.2 °F (36.8 °C),  Pulse: 71, Resp: 18, BP: (!) 117/59    LABS:  Labs Reviewed   CBC WITH AUTO DIFFERENTIAL - Abnormal; Notable for the following components:       Result Value    WBC 21.3 (*)     RBC 3.06 (*)     Hemoglobin 9.7 (*)     Hematocrit 29.6 (*)     RDW 17.9 (*)     Seg Neutrophils 93 (*)     Lymphocytes 4 (*)     Eosinophils % 0 (*)     Segs Absolute 19.80 (*)     Absolute Lymph # 0.70 (*)     All other components within normal limits   COMPREHENSIVE METABOLIC PANEL W/ REFLEX TO MG FOR LOW K - Abnormal; Notable for the following bilateral infiltrates on the chest x-ray. Blood cultures and lactic acid were obtained and patient was given IV antibiotics. Case was discussed with nurse practitioner covering for hospitalist and plan is to admit the patient on stepdown unit. Admission condition is stable.        Hossein Sargent MD  11/12/21 [id number]

## 2021-11-13 NOTE — ED NOTES
Assisted pt top bedside commode- specihat in place- instructed that no paper products to go in urine sample.       Jackie Jha RN  11/12/21 1949

## 2021-11-13 NOTE — PROGRESS NOTES
Pharmacy Note     Renal Dose Adjustment    Myesha Chiang is a [de-identified] y.o. female. Pharmacist assessment of renally cleared medications. Recent Labs     11/12/21 1922   BUN 66*       Recent Labs     11/12/21 1922   CREATININE 1.89*       Estimated Creatinine Clearance: 25 mL/min (A) (based on SCr of 1.89 mg/dL (H)). Estimated CrCl using Ideal Body Weight: 20 mL/min (based on IBW 52.4 kg)    Height:   Ht Readings from Last 1 Encounters:   11/12/21 5' 3\" (1.6 m)     Weight:  Wt Readings from Last 1 Encounters:   11/12/21 198 lb 10.2 oz (90.1 kg)       The following medication dose has been adjusted based upon renal function per P&T Guidelines:             Levaquin dose adjusted from 500 mg IV every 24 hours to 500 mg IV x1 dose then 250 mg IV every 24 hours. Juma Carranza Pharm. D.    11/12/2021  11:36 PM

## 2021-11-13 NOTE — PROGRESS NOTES
PHARMACY NOTE:    Mago Lira was ordered Turmeric. As per the [address] [place] [address], herbals and certain dietary supplements will be discontinued. The herbal or dietary supplement may be continued after discharge from the hospital.      Cachorro CINTRON    11/12/2021  11:23 PM

## 2021-11-13 NOTE — PROGRESS NOTES
PHARMACY NOTE:    Lawrence Moore was ordered Cinnamon. As per the [address], herbals and certain dietary supplements will be discontinued. The herbal or dietary supplement may be continued after discharge from the hospital.      Mendy CINTRON    11/12/2021  11:23 PM

## 2021-11-13 NOTE — PROGRESS NOTES
PHARMACY NOTE:    Saray Mejia was ordered Glucosamine. As per the [address], herbals and certain dietary supplements will be discontinued. The herbal or dietary supplement may be continued after discharge from the hospital.      Betty CINTRON    11/12/2021  11:25 PM

## 2021-11-14 PROBLEM — R74.01 TRANSAMINITIS: Status: ACTIVE | Noted: 2021-11-14

## 2021-11-14 PROBLEM — E87.20 LACTIC ACIDOSIS: Status: ACTIVE | Noted: 2021-11-14

## 2021-11-14 LAB
ABSOLUTE EOS #: 0 K/UL (ref 0–0.4)
ABSOLUTE IMMATURE GRANULOCYTE: ABNORMAL K/UL (ref 0–0.3)
ABSOLUTE LYMPH #: 0.7 K/UL (ref 1–4.8)
ABSOLUTE MONO #: 0.4 K/UL (ref 0.1–1.2)
ALBUMIN SERPL-MCNC: 3.1 G/DL (ref 3.5–5.2)
ALBUMIN/GLOBULIN RATIO: 1.1 (ref 1–2.5)
ALP BLD-CCNC: 332 U/L (ref 35–104)
ALT SERPL-CCNC: 71 U/L (ref 5–33)
ANION GAP SERPL CALCULATED.3IONS-SCNC: 15 MMOL/L (ref 9–17)
AST SERPL-CCNC: 81 U/L
BASOPHILS # BLD: 0 % (ref 0–2)
BASOPHILS ABSOLUTE: 0 K/UL (ref 0–0.2)
BILIRUB SERPL-MCNC: 0.61 MG/DL (ref 0.3–1.2)
BUN BLDV-MCNC: 50 MG/DL (ref 8–23)
BUN/CREAT BLD: ABNORMAL (ref 9–20)
CALCIUM SERPL-MCNC: 9.7 MG/DL (ref 8.6–10.4)
CHLORIDE BLD-SCNC: 103 MMOL/L (ref 98–107)
CO2: 19 MMOL/L (ref 20–31)
CREAT SERPL-MCNC: 1.4 MG/DL (ref 0.5–0.9)
DIFFERENTIAL TYPE: ABNORMAL
EOSINOPHILS RELATIVE PERCENT: 0 % (ref 1–4)
ESTIMATED AVERAGE GLUCOSE: 105 MG/DL
GFR AFRICAN AMERICAN: 44 ML/MIN
GFR NON-AFRICAN AMERICAN: 36 ML/MIN
GFR SERPL CREATININE-BSD FRML MDRD: ABNORMAL ML/MIN/{1.73_M2}
GFR SERPL CREATININE-BSD FRML MDRD: ABNORMAL ML/MIN/{1.73_M2}
GLUCOSE BLD-MCNC: 159 MG/DL (ref 70–99)
HBA1C MFR BLD: 5.3 % (ref 4–6)
HCT VFR BLD CALC: 29.5 % (ref 36–46)
HEMOGLOBIN: 9.5 G/DL (ref 12–16)
IMMATURE GRANULOCYTES: ABNORMAL %
LACTIC ACID, SEPSIS WHOLE BLOOD: ABNORMAL MMOL/L (ref 0.5–1.9)
LACTIC ACID, SEPSIS: 2.6 MMOL/L (ref 0.5–1.9)
LYMPHOCYTES # BLD: 4 % (ref 24–44)
MCH RBC QN AUTO: 31.7 PG (ref 26–34)
MCHC RBC AUTO-ENTMCNC: 32.1 G/DL (ref 31–37)
MCV RBC AUTO: 98.8 FL (ref 80–100)
MONOCYTES # BLD: 2 % (ref 2–11)
NRBC AUTOMATED: ABNORMAL PER 100 WBC
PDW BLD-RTO: 18.5 % (ref 12.5–15.4)
PLATELET # BLD: 352 K/UL (ref 140–450)
PLATELET ESTIMATE: ABNORMAL
PMV BLD AUTO: 8.6 FL (ref 6–12)
POTASSIUM SERPL-SCNC: 3.9 MMOL/L (ref 3.7–5.3)
RBC # BLD: 2.99 M/UL (ref 4–5.2)
RBC # BLD: ABNORMAL 10*6/UL
SEG NEUTROPHILS: 94 % (ref 36–66)
SEGMENTED NEUTROPHILS ABSOLUTE COUNT: 17.1 K/UL (ref 1.8–7.7)
SODIUM BLD-SCNC: 137 MMOL/L (ref 135–144)
TOTAL PROTEIN: 5.9 G/DL (ref 6.4–8.3)
WBC # BLD: 18.2 K/UL (ref 3.5–11)
WBC # BLD: ABNORMAL 10*3/UL

## 2021-11-14 PROCEDURE — 99232 SBSQ HOSP IP/OBS MODERATE 35: CPT | Performed by: NURSE PRACTITIONER

## 2021-11-14 PROCEDURE — 36415 COLL VENOUS BLD VENIPUNCTURE: CPT

## 2021-11-14 PROCEDURE — 85025 COMPLETE CBC W/AUTO DIFF WBC: CPT

## 2021-11-14 PROCEDURE — 2060000000 HC ICU INTERMEDIATE R&B

## 2021-11-14 PROCEDURE — 80053 COMPREHEN METABOLIC PANEL: CPT

## 2021-11-14 PROCEDURE — 2580000003 HC RX 258: Performed by: NURSE PRACTITIONER

## 2021-11-14 PROCEDURE — 83605 ASSAY OF LACTIC ACID: CPT

## 2021-11-14 PROCEDURE — 6360000002 HC RX W HCPCS: Performed by: NURSE PRACTITIONER

## 2021-11-14 PROCEDURE — 94760 N-INVAS EAR/PLS OXIMETRY 1: CPT

## 2021-11-14 PROCEDURE — 6370000000 HC RX 637 (ALT 250 FOR IP): Performed by: NURSE PRACTITIONER

## 2021-11-14 PROCEDURE — 94640 AIRWAY INHALATION TREATMENT: CPT

## 2021-11-14 PROCEDURE — 93005 ELECTROCARDIOGRAM TRACING: CPT | Performed by: INTERNAL MEDICINE

## 2021-11-14 RX ORDER — 0.9 % SODIUM CHLORIDE 0.9 %
1000 INTRAVENOUS SOLUTION INTRAVENOUS ONCE
Status: COMPLETED | OUTPATIENT
Start: 2021-11-14 | End: 2021-11-14

## 2021-11-14 RX ADMIN — TRAMADOL HYDROCHLORIDE 50 MG: 50 TABLET, COATED ORAL at 21:14

## 2021-11-14 RX ADMIN — HYDROXYCHLOROQUINE SULFATE 200 MG: 200 TABLET, FILM COATED ORAL at 21:15

## 2021-11-14 RX ADMIN — MULTIPLE VITAMINS W/ MINERALS TAB 1 TABLET: TAB at 09:18

## 2021-11-14 RX ADMIN — PANTOPRAZOLE SODIUM 40 MG: 40 TABLET, DELAYED RELEASE ORAL at 09:18

## 2021-11-14 RX ADMIN — LEVOTHYROXINE SODIUM 88 MCG: 88 TABLET ORAL at 06:13

## 2021-11-14 RX ADMIN — SODIUM CHLORIDE, PRESERVATIVE FREE 10 ML: 5 INJECTION INTRAVENOUS at 09:19

## 2021-11-14 RX ADMIN — HEPARIN SODIUM 5000 UNITS: 5000 INJECTION INTRAVENOUS; SUBCUTANEOUS at 06:13

## 2021-11-14 RX ADMIN — DEXAMETHASONE SODIUM PHOSPHATE 4 MG: 4 INJECTION, SOLUTION INTRAMUSCULAR; INTRAVENOUS at 14:18

## 2021-11-14 RX ADMIN — Medication 2000 UNITS: at 09:17

## 2021-11-14 RX ADMIN — LEVOFLOXACIN 250 MG: 5 INJECTION, SOLUTION INTRAVENOUS at 00:21

## 2021-11-14 RX ADMIN — SODIUM CHLORIDE 1000 ML: 9 INJECTION, SOLUTION INTRAVENOUS at 14:18

## 2021-11-14 RX ADMIN — ALBUTEROL SULFATE 2 PUFF: 90 AEROSOL, METERED RESPIRATORY (INHALATION) at 22:49

## 2021-11-14 RX ADMIN — ASPIRIN 81 MG: 81 TABLET, CHEWABLE ORAL at 09:17

## 2021-11-14 RX ADMIN — OXYCODONE HYDROCHLORIDE AND ACETAMINOPHEN 500 MG: 500 TABLET ORAL at 09:18

## 2021-11-14 RX ADMIN — TRAMADOL HYDROCHLORIDE 50 MG: 50 TABLET, COATED ORAL at 09:28

## 2021-11-14 RX ADMIN — DEXAMETHASONE SODIUM PHOSPHATE 4 MG: 4 INJECTION, SOLUTION INTRAMUSCULAR; INTRAVENOUS at 21:14

## 2021-11-14 RX ADMIN — HEPARIN SODIUM 5000 UNITS: 5000 INJECTION INTRAVENOUS; SUBCUTANEOUS at 21:14

## 2021-11-14 RX ADMIN — HYDROXYCHLOROQUINE SULFATE 200 MG: 200 TABLET, FILM COATED ORAL at 09:18

## 2021-11-14 RX ADMIN — BUDESONIDE AND FORMOTEROL FUMARATE DIHYDRATE 2 PUFF: 160; 4.5 AEROSOL RESPIRATORY (INHALATION) at 09:52

## 2021-11-14 RX ADMIN — DEXAMETHASONE SODIUM PHOSPHATE 4 MG: 4 INJECTION, SOLUTION INTRAMUSCULAR; INTRAVENOUS at 09:18

## 2021-11-14 RX ADMIN — HEPARIN SODIUM 5000 UNITS: 5000 INJECTION INTRAVENOUS; SUBCUTANEOUS at 14:18

## 2021-11-14 RX ADMIN — BUDESONIDE AND FORMOTEROL FUMARATE DIHYDRATE 2 PUFF: 160; 4.5 AEROSOL RESPIRATORY (INHALATION) at 22:49

## 2021-11-14 RX ADMIN — ALLOPURINOL 300 MG: 300 TABLET ORAL at 09:18

## 2021-11-14 RX ADMIN — ALBUTEROL SULFATE 2 PUFF: 90 AEROSOL, METERED RESPIRATORY (INHALATION) at 09:52

## 2021-11-14 RX ADMIN — DEXAMETHASONE SODIUM PHOSPHATE 4 MG: 4 INJECTION, SOLUTION INTRAMUSCULAR; INTRAVENOUS at 17:43

## 2021-11-14 ASSESSMENT — PAIN DESCRIPTION - LOCATION
LOCATION: ABDOMEN
LOCATION: ABDOMEN

## 2021-11-14 ASSESSMENT — PAIN DESCRIPTION - PAIN TYPE
TYPE: ACUTE PAIN
TYPE: ACUTE PAIN

## 2021-11-14 ASSESSMENT — PAIN SCALES - GENERAL
PAINLEVEL_OUTOF10: 0
PAINLEVEL_OUTOF10: 1
PAINLEVEL_OUTOF10: 7
PAINLEVEL_OUTOF10: 6
PAINLEVEL_OUTOF10: 0
PAINLEVEL_OUTOF10: 3

## 2021-11-14 ASSESSMENT — PAIN DESCRIPTION - PROGRESSION
CLINICAL_PROGRESSION: GRADUALLY IMPROVING
CLINICAL_PROGRESSION: GRADUALLY IMPROVING

## 2021-11-14 ASSESSMENT — PAIN DESCRIPTION - FREQUENCY
FREQUENCY: CONTINUOUS
FREQUENCY: CONTINUOUS

## 2021-11-14 ASSESSMENT — PAIN DESCRIPTION - ORIENTATION
ORIENTATION: MID
ORIENTATION: MID

## 2021-11-14 ASSESSMENT — PAIN DESCRIPTION - ONSET
ONSET: ON-GOING
ONSET: ON-GOING

## 2021-11-14 ASSESSMENT — PAIN - FUNCTIONAL ASSESSMENT: PAIN_FUNCTIONAL_ASSESSMENT: ACTIVITIES ARE NOT PREVENTED

## 2021-11-14 ASSESSMENT — PAIN DESCRIPTION - DESCRIPTORS
DESCRIPTORS: ACHING
DESCRIPTORS: ACHING

## 2021-11-14 NOTE — PROGRESS NOTES
Patients home BIPAP/CPAP checked for integrity and cleanliness. Humidifier chamber filled with distilled water.   Ready for use

## 2021-11-15 ENCOUNTER — APPOINTMENT (OUTPATIENT)
Dept: ULTRASOUND IMAGING | Age: 81
DRG: 683 | End: 2021-11-15
Payer: MEDICARE

## 2021-11-15 PROBLEM — E87.20 LACTIC ACIDOSIS: Status: RESOLVED | Noted: 2021-11-14 | Resolved: 2021-11-15

## 2021-11-15 PROBLEM — N17.9 AKI (ACUTE KIDNEY INJURY) (HCC): Status: RESOLVED | Noted: 2021-11-12 | Resolved: 2021-11-15

## 2021-11-15 PROBLEM — D72.829 LEUKOCYTOSIS: Status: RESOLVED | Noted: 2021-11-13 | Resolved: 2021-11-15

## 2021-11-15 LAB
-: ABNORMAL
ABSOLUTE RETIC #: 0.05 M/UL (ref 0.03–0.08)
ADENOVIRUS PCR: NOT DETECTED
ALBUMIN SERPL-MCNC: 2.9 G/DL (ref 3.5–5.2)
ALBUMIN/GLOBULIN RATIO: 1.2 (ref 1–2.5)
ALP BLD-CCNC: 303 U/L (ref 35–104)
ALT SERPL-CCNC: 69 U/L (ref 5–33)
AMORPHOUS: ABNORMAL
ANION GAP SERPL CALCULATED.3IONS-SCNC: 13 MMOL/L (ref 9–17)
AST SERPL-CCNC: 86 U/L
BACTERIA: ABNORMAL
BILIRUB SERPL-MCNC: 0.57 MG/DL (ref 0.3–1.2)
BILIRUBIN URINE: NEGATIVE
BORDETELLA PARAPERTUSSIS: NOT DETECTED
BORDETELLA PERTUSSIS PCR: NOT DETECTED
BUN BLDV-MCNC: 40 MG/DL (ref 8–23)
BUN/CREAT BLD: ABNORMAL (ref 9–20)
CALCIUM SERPL-MCNC: 8.9 MG/DL (ref 8.6–10.4)
CASTS UA: ABNORMAL /LPF
CHLAMYDIA PNEUMONIAE BY PCR: NOT DETECTED
CHLORIDE BLD-SCNC: 105 MMOL/L (ref 98–107)
CO2: 19 MMOL/L (ref 20–31)
COLOR: YELLOW
COMMENT UA: ABNORMAL
CORONAVIRUS 229E PCR: NOT DETECTED
CORONAVIRUS HKU1 PCR: NOT DETECTED
CORONAVIRUS NL63 PCR: NOT DETECTED
CORONAVIRUS OC43 PCR: NOT DETECTED
CREAT SERPL-MCNC: 1.01 MG/DL (ref 0.5–0.9)
CRYSTALS, UA: ABNORMAL /HPF
EKG ATRIAL RATE: 77 BPM
EKG P AXIS: 52 DEGREES
EKG P-R INTERVAL: 142 MS
EKG Q-T INTERVAL: 384 MS
EKG QRS DURATION: 90 MS
EKG QTC CALCULATION (BAZETT): 434 MS
EKG R AXIS: -22 DEGREES
EKG T AXIS: 33 DEGREES
EKG VENTRICULAR RATE: 77 BPM
EPITHELIAL CELLS UA: ABNORMAL /HPF (ref 0–5)
FERRITIN: 1592 UG/L (ref 13–150)
GFR AFRICAN AMERICAN: >60 ML/MIN
GFR NON-AFRICAN AMERICAN: 53 ML/MIN
GFR SERPL CREATININE-BSD FRML MDRD: ABNORMAL ML/MIN/{1.73_M2}
GFR SERPL CREATININE-BSD FRML MDRD: ABNORMAL ML/MIN/{1.73_M2}
GLUCOSE BLD-MCNC: 169 MG/DL (ref 70–99)
GLUCOSE URINE: ABNORMAL
HUMAN METAPNEUMOVIRUS PCR: NOT DETECTED
IMMATURE RETIC FRACT: 13.1 % (ref 2.7–18.3)
INFLUENZA A BY PCR: NOT DETECTED
INFLUENZA A H1 (2009) PCR: NORMAL
INFLUENZA A H1 PCR: NORMAL
INFLUENZA A H3 PCR: NORMAL
INFLUENZA B BY PCR: NOT DETECTED
IRON SATURATION: 25 % (ref 20–55)
IRON: 42 UG/DL (ref 37–145)
KETONES, URINE: NEGATIVE
LACTIC ACID, SEPSIS WHOLE BLOOD: ABNORMAL MMOL/L (ref 0.5–1.9)
LACTIC ACID, SEPSIS WHOLE BLOOD: NORMAL MMOL/L (ref 0.5–1.9)
LACTIC ACID, SEPSIS: 1.9 MMOL/L (ref 0.5–1.9)
LACTIC ACID, SEPSIS: 2.5 MMOL/L (ref 0.5–1.9)
LEGIONELLA PNEUMOPHILIA AG, URINE: NEGATIVE
LEUKOCYTE ESTERASE, URINE: NEGATIVE
MUCUS: ABNORMAL
MYCOPLASMA PNEUMONIAE IGM: 0.17
MYCOPLASMA PNEUMONIAE PCR: NOT DETECTED
NITRITE, URINE: NEGATIVE
OTHER OBSERVATIONS UA: ABNORMAL
PARAINFLUENZA 1 PCR: NOT DETECTED
PARAINFLUENZA 2 PCR: NOT DETECTED
PARAINFLUENZA 3 PCR: NOT DETECTED
PARAINFLUENZA 4 PCR: NOT DETECTED
PH UA: 6 (ref 5–8)
POTASSIUM SERPL-SCNC: 3.8 MMOL/L (ref 3.7–5.3)
PROTEIN UA: NEGATIVE
RBC UA: ABNORMAL /HPF (ref 0–2)
RENAL EPITHELIAL, UA: ABNORMAL /HPF
RESP SYNCYTIAL VIRUS PCR: NOT DETECTED
RETIC %: 1.9 % (ref 0.5–1.9)
RETIC HEMOGLOBIN: 39.1 PG (ref 28.2–35.7)
RHINO/ENTEROVIRUS PCR: NOT DETECTED
SARS-COV-2, PCR: NOT DETECTED
SODIUM BLD-SCNC: 137 MMOL/L (ref 135–144)
SODIUM,UR: 48 MMOL/L
SPECIFIC GRAVITY UA: 1.02 (ref 1–1.03)
SPECIMEN DESCRIPTION: NORMAL
TOTAL IRON BINDING CAPACITY: 168 UG/DL (ref 250–450)
TOTAL PROTEIN, URINE: 11 MG/DL
TOTAL PROTEIN: 5.3 G/DL (ref 6.4–8.3)
TRICHOMONAS: ABNORMAL
TURBIDITY: CLEAR
UNSATURATED IRON BINDING CAPACITY: 126 UG/DL (ref 112–347)
URINE HGB: NEGATIVE
UROBILINOGEN, URINE: NORMAL
WBC UA: ABNORMAL /HPF (ref 0–5)
YEAST: ABNORMAL

## 2021-11-15 PROCEDURE — 94640 AIRWAY INHALATION TREATMENT: CPT

## 2021-11-15 PROCEDURE — 82728 ASSAY OF FERRITIN: CPT

## 2021-11-15 PROCEDURE — 6370000000 HC RX 637 (ALT 250 FOR IP): Performed by: NURSE PRACTITIONER

## 2021-11-15 PROCEDURE — 84156 ASSAY OF PROTEIN URINE: CPT

## 2021-11-15 PROCEDURE — 87449 NOS EACH ORGANISM AG IA: CPT

## 2021-11-15 PROCEDURE — 99232 SBSQ HOSP IP/OBS MODERATE 35: CPT | Performed by: NURSE PRACTITIONER

## 2021-11-15 PROCEDURE — 0202U NFCT DS 22 TRGT SARS-COV-2: CPT

## 2021-11-15 PROCEDURE — 76705 ECHO EXAM OF ABDOMEN: CPT

## 2021-11-15 PROCEDURE — 2580000003 HC RX 258: Performed by: NURSE PRACTITIONER

## 2021-11-15 PROCEDURE — 97116 GAIT TRAINING THERAPY: CPT

## 2021-11-15 PROCEDURE — 83550 IRON BINDING TEST: CPT

## 2021-11-15 PROCEDURE — 94760 N-INVAS EAR/PLS OXIMETRY 1: CPT

## 2021-11-15 PROCEDURE — 97162 PT EVAL MOD COMPLEX 30 MIN: CPT

## 2021-11-15 PROCEDURE — 83605 ASSAY OF LACTIC ACID: CPT

## 2021-11-15 PROCEDURE — 97535 SELF CARE MNGMENT TRAINING: CPT

## 2021-11-15 PROCEDURE — 85045 AUTOMATED RETICULOCYTE COUNT: CPT

## 2021-11-15 PROCEDURE — 6360000002 HC RX W HCPCS: Performed by: NURSE PRACTITIONER

## 2021-11-15 PROCEDURE — 36415 COLL VENOUS BLD VENIPUNCTURE: CPT

## 2021-11-15 PROCEDURE — 97166 OT EVAL MOD COMPLEX 45 MIN: CPT

## 2021-11-15 PROCEDURE — 83540 ASSAY OF IRON: CPT

## 2021-11-15 PROCEDURE — 84300 ASSAY OF URINE SODIUM: CPT

## 2021-11-15 PROCEDURE — 81001 URINALYSIS AUTO W/SCOPE: CPT

## 2021-11-15 PROCEDURE — 2060000000 HC ICU INTERMEDIATE R&B

## 2021-11-15 PROCEDURE — 80053 COMPREHEN METABOLIC PANEL: CPT

## 2021-11-15 RX ORDER — CYCLOBENZAPRINE HCL 10 MG
10 TABLET ORAL 3 TIMES DAILY PRN
Status: DISCONTINUED | OUTPATIENT
Start: 2021-11-15 | End: 2021-11-17 | Stop reason: HOSPADM

## 2021-11-15 RX ORDER — LANOLIN ALCOHOL/MO/W.PET/CERES
3 CREAM (GRAM) TOPICAL NIGHTLY PRN
Refills: 3 | DISCHARGE
Start: 2021-11-15

## 2021-11-15 RX ADMIN — ALBUTEROL SULFATE 2 PUFF: 90 AEROSOL, METERED RESPIRATORY (INHALATION) at 07:39

## 2021-11-15 RX ADMIN — DEXAMETHASONE SODIUM PHOSPHATE 4 MG: 4 INJECTION, SOLUTION INTRAMUSCULAR; INTRAVENOUS at 21:49

## 2021-11-15 RX ADMIN — BUDESONIDE AND FORMOTEROL FUMARATE DIHYDRATE 2 PUFF: 160; 4.5 AEROSOL RESPIRATORY (INHALATION) at 07:38

## 2021-11-15 RX ADMIN — ALLOPURINOL 300 MG: 300 TABLET ORAL at 09:49

## 2021-11-15 RX ADMIN — OXYCODONE HYDROCHLORIDE AND ACETAMINOPHEN 500 MG: 500 TABLET ORAL at 09:43

## 2021-11-15 RX ADMIN — PANTOPRAZOLE SODIUM 40 MG: 40 TABLET, DELAYED RELEASE ORAL at 08:19

## 2021-11-15 RX ADMIN — DEXAMETHASONE SODIUM PHOSPHATE 4 MG: 4 INJECTION, SOLUTION INTRAMUSCULAR; INTRAVENOUS at 17:11

## 2021-11-15 RX ADMIN — Medication 2000 UNITS: at 09:42

## 2021-11-15 RX ADMIN — HEPARIN SODIUM 5000 UNITS: 5000 INJECTION INTRAVENOUS; SUBCUTANEOUS at 06:15

## 2021-11-15 RX ADMIN — DEXAMETHASONE SODIUM PHOSPHATE 4 MG: 4 INJECTION, SOLUTION INTRAMUSCULAR; INTRAVENOUS at 13:11

## 2021-11-15 RX ADMIN — HYDROXYCHLOROQUINE SULFATE 200 MG: 200 TABLET, FILM COATED ORAL at 21:49

## 2021-11-15 RX ADMIN — HEPARIN SODIUM 5000 UNITS: 5000 INJECTION INTRAVENOUS; SUBCUTANEOUS at 14:44

## 2021-11-15 RX ADMIN — HEPARIN SODIUM 5000 UNITS: 5000 INJECTION INTRAVENOUS; SUBCUTANEOUS at 21:49

## 2021-11-15 RX ADMIN — ALBUTEROL SULFATE 2 PUFF: 90 AEROSOL, METERED RESPIRATORY (INHALATION) at 21:39

## 2021-11-15 RX ADMIN — SODIUM CHLORIDE, PRESERVATIVE FREE 10 ML: 5 INJECTION INTRAVENOUS at 22:01

## 2021-11-15 RX ADMIN — BUDESONIDE AND FORMOTEROL FUMARATE DIHYDRATE 2 PUFF: 160; 4.5 AEROSOL RESPIRATORY (INHALATION) at 21:41

## 2021-11-15 RX ADMIN — DEXAMETHASONE SODIUM PHOSPHATE 4 MG: 4 INJECTION, SOLUTION INTRAMUSCULAR; INTRAVENOUS at 09:43

## 2021-11-15 RX ADMIN — LEVOTHYROXINE SODIUM 88 MCG: 88 TABLET ORAL at 04:11

## 2021-11-15 RX ADMIN — LEVOFLOXACIN 250 MG: 5 INJECTION, SOLUTION INTRAVENOUS at 00:08

## 2021-11-15 RX ADMIN — ASPIRIN 81 MG: 81 TABLET, CHEWABLE ORAL at 09:44

## 2021-11-15 ASSESSMENT — PAIN - FUNCTIONAL ASSESSMENT
PAIN_FUNCTIONAL_ASSESSMENT: PREVENTS OR INTERFERES SOME ACTIVE ACTIVITIES AND ADLS
PAIN_FUNCTIONAL_ASSESSMENT: PREVENTS OR INTERFERES SOME ACTIVE ACTIVITIES AND ADLS

## 2021-11-15 ASSESSMENT — PAIN DESCRIPTION - LOCATION
LOCATION: SHOULDER
LOCATION: SHOULDER

## 2021-11-15 ASSESSMENT — PAIN SCALES - GENERAL
PAINLEVEL_OUTOF10: 8
PAINLEVEL_OUTOF10: 8

## 2021-11-15 ASSESSMENT — PAIN DESCRIPTION - DESCRIPTORS
DESCRIPTORS: ACHING;DISCOMFORT
DESCRIPTORS: ACHING;DISCOMFORT

## 2021-11-15 ASSESSMENT — PAIN DESCRIPTION - PROGRESSION: CLINICAL_PROGRESSION: GRADUALLY WORSENING

## 2021-11-15 ASSESSMENT — PAIN DESCRIPTION - PAIN TYPE
TYPE: CHRONIC PAIN
TYPE: CHRONIC PAIN

## 2021-11-15 ASSESSMENT — PAIN DESCRIPTION - ORIENTATION
ORIENTATION: RIGHT
ORIENTATION: RIGHT

## 2021-11-15 NOTE — PROGRESS NOTES
Occupational Therapy   Occupational Therapy Initial Assessment  Date: 11/15/2021   Patient Name: Rhina Mosley  MRN: [id number]     : 1940    Date of Service: 11/15/2021    Chief Complaint   Patient presents with    Fatigue     pt was admitted to hospital in october- rehab s/p hospitalization, was d/c from [place] 2 days ago- having increased fatigue        Discharge Recommendations:  Patient would benefit from continued therapy after discharge       Assessment   Performance deficits / Impairments: Decreased functional mobility ; Decreased ADL status; Decreased ROM; Decreased strength; Decreased safe awareness; Decreased endurance; Decreased cognition; Decreased balance; Decreased high-level IADLs; Decreased coordination; Decreased fine motor control  Assessment: Pt currently limited in performing ADL tasks and functional transfers/functional mobility due to above noted deficits. Pt to benefit from continued therapy services while hospitalized and at discharge to maximize pt's safety and independence in performing functional tasks. Pt unsafe to return to prior living arrangements at this time based on pt's current functional ability; 24hr assistance and continued therapy recommended. Prognosis: Good  Decision Making: Medium Complexity  OT Education: OT Role; Plan of Care; Precautions; ADL Adaptive Strategies; Transfer Training; Equipment; Energy Conservation  REQUIRES OT FOLLOW UP: Yes  Activity Tolerance  Activity Tolerance: Patient limited by fatigue  Safety Devices  Safety Devices in place: Yes  Type of devices: Call light within reach; Nurse notified; Left in chair  Restraints  Initially in place: No           Patient Diagnosis(es): The primary encounter diagnosis was Generalized weakness. Diagnoses of Pneumonia of both lower lobes due to infectious organism, Leukocytosis, unspecified type, Elevated LFTs, and PAOLA (acute kidney injury) ([facility] [address] were also pertinent to this visit.      has a past medical history of Arthritis, Asthma, Back pain, Caffeine use, Carpal tunnel syndrome, Constipation, Cystitis, Dizziness, Gout, Hearing loss, History of blood transfusion, Hypertension, Malignant melanoma ([address] Meralgia paresthetica, Mitral valve regurgitation, Obesity, Osteoarthritis, Osteoporosis, PONV (postoperative nausea and vomiting), Sleep apnea, Thyroid disease, Urinary incontinence, Venous insufficiency, Vertigo, Weakness, and Wears hearing aid. has a past surgical history that includes Tonsillectomy; Hysterectomy; knee surgery (Left); Foot surgery (Right); Cystoscopy; Cataract removal (Bilateral); joint replacement (Left, 2011); Knee arthroscopy (Left); Carpal tunnel release (Bilateral); Colonoscopy; Cataract removal with implant (Bilateral); and pr total knee arthroplasty (Right, 7/17/2018). Restrictions  Restrictions/Precautions  Restrictions/Precautions: Fall Risk  Required Braces or Orthoses?: No  Position Activity Restriction  Other position/activity restrictions: Up with assistance    Subjective   General  Patient assessed for rehabilitation services?: Yes  Family / Caregiver Present: No  General Comment  Comments: RN ok'd for therapy this AM. Pt agreeable to participate in session and pleasant/cooperative throughout. Patient Currently in Pain: Yes  Pain Assessment  Pain Assessment: 0-10  Pain Level: 8  Pain Type: Chronic pain  Pain Location: Shoulder  Pain Orientation: Right  Pain Descriptors: Aching; Discomfort  Clinical Progression: Gradually worsening  Functional Pain Assessment: Prevents or interferes some active activities and ADLs  Non-Pharmaceutical Pain Intervention(s): Ambulation/Increased Activity;  Distraction; Repositioned  Response to Pain Intervention: Patient Satisfied  Vital Signs  Patient Currently in Pain: Yes     Social/Functional History  Social/Functional History  Lives With: Alone  Type of Home: House (First floor of the duplex)  Home Layout: One level  Home Access: Stairs to enter with rails  Entrance Stairs - Number of Steps: 2  Entrance Stairs - Rails: Left  Bathroom Shower/Tub: Tub/Shower unit  Bathroom Toilet: Standard  Bathroom Equipment: Toilet raiser  Home Equipment: Rolling walker, Lucretia Macedo (Reports use of RW at all times. Notes her daugther recently rented a w/c from the [employer] but it doesn't fit around her house to use.)  ADL Assistance: Independent (Prior to last hospitalization she was completely independent with ADLs. She reports since her recent return home she hasn't been able to perform on her own lately. Pt states daughter's were helping her once she fatigued. 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        Objective   Vision: Impaired  Vision Exceptions: Wears glasses for reading  Hearing: Exceptions to [facility]  Hearing Exceptions: Bilateral hearing aid; Hard of hearing/hearing concerns    Orientation  Overall Orientation Status: Within Functional Limits        Balance  Sitting Balance: Stand by assistance  Standing Balance: Minimal assistance (with use of RW)    Functional Mobility  Functional - Mobility Device: Rolling Walker  Activity: To/from bathroom  Assist Level: Minimal assistance   Comments: Min VCs for safety awareness and RW mngt; increased time/effort to perform; mildly unsteady however no true LOB; complaint of fatigue following minimal exertion. ADL  Grooming: Minimal assistance; Increased time to complete  UE Bathing: Moderate assistance; Increased time to complete  LE Bathing: Maximum assistance; Increased time to complete  UE Dressing: Moderate assistance; Increased time to complete  LE Dressing: Increased time to complete; Maximum assistance  Toileting: Increased time to complete; Maximum assistance (for LB clothing mngt, pericare/bottom hygiene, and toilet transfer)  Comments: Increased time/effort with noted fatigue following minimal exertion; min-mod VCs for sequencing/safety awareness; decreased dynamic sitting/standing balance; limited BUE ROM and decreased FMC/GMC. Tone RUE  RUE Tone: Normotonic  Tone LUE  LUE Tone: Normotonic  Coordination  Movements Are Fluid And Coordinated: No  Coordination and Movement description: Fine motor impairments; Gross motor impairments; Decreased speed; Decreased accuracy; Right UE; Left UE        Bed mobility  Scooting: Minimal assistance (assessed in chair)  Comment: Pt up to chair at start of session and retired up to chair at end of session. Transfers  Sit to stand: Minimal assistance  Stand to sit: Minimal assistance  Transfer Comments: Increased time/effort. Attempted to educate pt in proper hand placement/safety awareness with use of RW during functional transfers, pt not compliant and performed with B hands placed on RW.         Cognition  Overall Cognitive Status: WFL           Sensation  Overall Sensation Status: WFL (Pt denies any numbness/tingling)        LUE AROM (degrees)  LUE AROM : WFL (limited at shoulder ~0-45* actively and passively ~0-90*)  RUE AROM (degrees)  RUE AROM : WFL  RUE General AROM: WFL at elbow/wrist/hand, limited at shoulder ~0-20* actively and ~0-45* passively  LUE Strength  LUE Strength Comment: grossly 3+/5 within available range  RUE Strength  RUE Strength Comment: grossly 3/5 within available range         Plan   Plan  Times per week: 5-6x/wk  Times per day: Daily  Current Treatment Recommendations: Balance Training, Functional Mobility Training, Endurance Training, Safety Education & Training, Patient/Caregiver Education & Training, Equipment Evaluation, Education, & procurement, Self-Care / ADL, Home Management Training, Strengthening, ROM    AM-PAC Score   AM-PAC Inpatient Daily Activity Raw Score: 16 (11/15/21 1254)  AM-PAC Inpatient ADL T-Scale Score : 35.96 (11/15/21 1254)  ADL Inpatient CMS 0-100% Score: 53.32 (11/15/21 1254)  ADL Inpatient CMS G-Code Modifier : CK (11/15/21 1254)    Goals  Short term goals  Time Frame for Short term goals: 14 visits  Short term goal 1: Pt will perform ADL tasks with mod IND using AE/DME PRN  Short term goal 2: Pt will perform functional transfers/functional with mod IND using AE/DME PRN  Short term goal 3: Pt will demo 10+ minutes standing tolerance with use of least restrictive AD for increased participation in ADL/IADL tasks  Short term goal 4: Pt will independently demo good safety awareness during engagement in all ADLs and functional transfers/functional mobility       Therapy Time   Individual Concurrent Group Co-treatment   Time In 1056         Time Out 1131         Minutes 35         Timed Code Treatment Minutes: 12 Minutes       Mary Lezama OTR/L

## 2021-11-15 NOTE — FLOWSHEET NOTE
[address] [facility]  PROGRESS NOTE    Shift date: 11/15/21  Shift day: Monday   Shift # 1    Room # 376/891-17   Name: Rosaura Padron            Age: [de-identified] y.o. Gender: female          Buddhism: Marcelino Manning 33 of Mosque: Rodriguez Conrad Dasilva    Referral: Routine Visit    Admit Date & Time: 11/12/2021  6:14 PM    PATIENT/EVENT DESCRIPTION:  Rosaura Padron is a [de-identified] y.o. female whom writer met during her previous admission. Today, writer provided a follow up visit during her current admission. SPIRITUAL ASSESSMENT/INTERVENTION:  Ms. Rafael Deluna was sitting in the bedside chair. She told writer she was waiting for the Lamona Gunner of her [employer] to come and bring her [employer]. She shared how she was feeling, noting the impact of her current state on her. She acknowledged her feelings and thoughts, including that \"this was not in my plans. \" Daughter, Edith Kat, arrived. She introduced herself to writer. Patient talked about her previous holiday plans and what she would like to do for this holiday. She stated her intention to live to see her grandchildren  next year. She mentioned a Scripture passage and was receptive to writer reading it and praying for Pt. She offered a prayer and listened as writer read the [employer] passage and offered prayer. Writer provided supportive presence and inquired about Pt's coping and needs; offered empathy and words of support and encouragement; affirmed Pt's strengths; prayed for Pt. SPIRITUAL CARE FOLLOW-UP PLAN:  Chaplains will remain available to offer spiritual and emotional support as needed. 11/15/21 1419   Encounter Summary   Services provided to: Patient; Patient and family together   Referral/Consult From: [facility] Family members;  Children; Tenriism/delbert community; Friends/neighbors   [facility] of [facility] [address]   (11/15/21)   Complexity of Encounter Moderate   Length of Encounter 30 minutes   Spiritual Assessment Completed Yes   Routine   Type Follow up   Spiritual/Temple   Type Spiritual support   Assessment Calm; Approachable   Intervention Active listening; Explored feelings, thoughts, concerns; Explored coping resources; Sustaining presence/ Ministry of presence; Discussed illness/injury and it's impact; Discussed belief system/Druze practices/delbert; Discussed relationship with God; Discussed meaning/purpose; Prayer; Scripture   Outcome Venting emotion; Receptive;  Hopeful; Expressed feelings/needs/concerns; Coping; Engaged in conversation; Expressed gratitude; Encouraged       Electronically signed by Tk Hou on 11/15/2021 at 2:20 PM.  [facility]  [phone]

## 2021-11-15 NOTE — PROGRESS NOTES
Physician Progress Note      PATIENT:               Destinee Gold  CSN #:                  [id number]  :                       1940  ADMIT DATE:       2021 6:14 PM  [address] [place] DATE:  RESPONDING  PROVIDER #:        Rachana NUÑEZ DO          QUERY TEXT:    Pt admitted with PAOLA . Pt noted to have Atypical Pneumonia with WBC   21.3>19.2>18.2, Lactic acid 2.6>2.5>. 1.9 procalcitonin . 47. known   immunocompromised state  and is on steroids If possible, please clarify one of   the following      The medical record reflects the following:  Risk Factors: age, malignant melanoma  Clinical Indicators: Pt noted to have Atypical Pneumonia with WBC   21.3>19.2>18.2, Lactic acid 2.6>2.5.1.9 procalcitonin . 47. known   immunocompromised state  and is on steroids  Treatment: IV Levaquinn    thank you Call if questions Caitlin Shafer LYN CCDS                                                                                                                                                                                                                                                                                                                                 [phone]  Options provided:  -- Sepsis, present on admission  -- Sepsis, present on admission, now resolved  -- pneumonia, without Sepsis  -- Other - I will add my own diagnosis  -- Disagree - Not applicable / Not valid  -- Disagree - Clinically unable to determine / Unknown  -- Refer to Clinical Documentation Reviewer    PROVIDER RESPONSE TEXT:    No sepsis. Just dehydration    Query created by:  Corry Guzman on 11/15/2021 12:33 PM      Electronically signed by:  Regan Scales DO 11/15/2021 3:24 PM

## 2021-11-15 NOTE — PLAN OF CARE
Problem: Falls - Risk of:  Goal: Will remain free from falls  Description: Will remain free from falls  Outcome: Ongoing  Goal: Absence of physical injury  Description: Absence of physical injury  Outcome: Ongoing     Problem: Skin Integrity:  Goal: Will show no infection signs and symptoms  Description: Will show no infection signs and symptoms  Outcome: Ongoing  Goal: Absence of new skin breakdown  Description: Absence of new skin breakdown  Outcome: Ongoing     Problem: Pain:  Goal: Pain level will decrease  Description: Pain level will decrease  Outcome: Ongoing  Goal: Control of acute pain  Description: Control of acute pain  Outcome: Ongoing  Goal: Control of chronic pain  Description: Control of chronic pain  Outcome: Ongoing     Problem: Musculor/Skeletal Functional Status  Goal: Highest potential functional level  Outcome: Ongoing     Problem: Respiratory:  Goal: Ability to maintain a clear airway will improve  Description: Ability to maintain a clear airway will improve  11/15/2021 1604 by Jose Antonio Umaña RN  Outcome: Ongoing  11/15/2021 1338 by Fabiola Ortega RCP  Outcome: Ongoing

## 2021-11-15 NOTE — PROGRESS NOTES
Physical Therapy    Facility/Department: [facility] SURG ICU  Initial Assessment    NAME: Matt Peter  : 1940  MRN: [id number]    Date of Service: 11/15/2021  Chief Complaint   Patient presents with    Fatigue     pt was admitted to hospital in october- rehab s/p hospitalization, was d/c from [place] 2 days ago- having increased fatigue        Discharge Recommendations:  Patient would benefit from continued therapy after discharge   PT Equipment Recommendations  Equipment Needed: No  Other: Pt owns a RW and writer will recommend continued use of device    Assessment   Body structures, Functions, Activity limitations: Decreased functional mobility ; Decreased strength; Decreased safe awareness; Decreased endurance; Decreased balance  Assessment: Pt with impaired mobility requiring min A for transfers and CGA for gait training today with RW. Pt with decreased endurance and decreased bilateral UE and LE strength limiting mobility as well as functional tasks. Pt would be unsafe to return to prior living arrangements based on demonstrated mobility without strict 24/7 assistance and will benefit from further therapy at discharge. Prognosis: Fair  Decision Making: Medium Complexity  PT Education: Goals; PT Role; Plan of Care; Transfer Training; Functional Mobility Training; Gait Training  REQUIRES PT FOLLOW UP: Yes  Activity Tolerance  Activity Tolerance: Patient limited by fatigue; Patient limited by endurance       Patient Diagnosis(es): The primary encounter diagnosis was Generalized weakness. Diagnoses of Pneumonia of both lower lobes due to infectious organism, Leukocytosis, unspecified type, Elevated LFTs, and PAOLA (acute kidney injury) ([facility] [address] were also pertinent to this visit.      has a past medical history of Arthritis, Asthma, Back pain, Caffeine use, Carpal tunnel syndrome, Constipation, Cystitis, Dizziness, Gout, Hearing loss, History of blood transfusion, Hypertension, Malignant melanoma ([facility] [address] Meralgia paresthetica, Mitral valve regurgitation, Obesity, Osteoarthritis, Osteoporosis, PONV (postoperative nausea and vomiting), Sleep apnea, Thyroid disease, Urinary incontinence, Venous insufficiency, Vertigo, Weakness, and Wears hearing aid. has a past surgical history that includes Tonsillectomy; Hysterectomy; knee surgery (Left); Foot surgery (Right); Cystoscopy; Cataract removal (Bilateral); joint replacement (Left, 2011); Knee arthroscopy (Left); Carpal tunnel release (Bilateral); Colonoscopy; Cataract removal with implant (Bilateral); and pr total knee arthroplasty (Right, 7/17/2018). Restrictions  Restrictions/Precautions  Restrictions/Precautions: Fall Risk  Required Braces or Orthoses?: No  Position Activity Restriction  Other position/activity restrictions: Up with assistance  Vision/Hearing  Vision: Impaired  Vision Exceptions: Wears glasses for reading  Hearing: Exceptions to [facility]  Hearing Exceptions: Bilateral hearing aid     Subjective  General  Patient assessed for rehabilitation services?: Yes  Response To Previous Treatment: Not applicable  Family / Caregiver Present: No  Follows Commands: Within Functional Limits  Subjective  Subjective: Pt up to chair upon arrival. RN agreeable to therapy this morning. Pt reports high levels of pain with mobility in her shoulder but feels ok at rest.  Pain Screening  Patient Currently in Pain: Yes  Pain Assessment  Pain Assessment: 0-10  Pain Level: 8  Pain Type: Chronic pain  Pain Location: Shoulder  Pain Orientation: Right  Pain Descriptors: Aching; Discomfort  Functional Pain Assessment: Prevents or interferes some active activities and ADLs  Non-Pharmaceutical Pain Intervention(s): Ambulation/Increased Activity;  Distraction; Repositioned  Response to Pain Intervention: Patient Satisfied  Vital Signs  Patient Currently in Pain: Yes       Orientation  Orientation  Overall Orientation Status: Within Functional Limits  Social/Functional History  Social/Functional History  Lives With: Alone  Type of Home: House (First floor of the duplex)  Home Layout: One level  Home Access: Stairs to enter with rails  Entrance Stairs - Number of Steps: 2  Entrance Stairs - Rails: Left  Bathroom Shower/Tub: Tub/Shower unit  Bathroom Toilet: Standard  Bathroom Equipment: Toilet raiser  Home Equipment: Rolling walker, Cristofer Gram (Reports use of RW at all times. Notes her daugther recently rented a w/c from the [employer] but it doesn't fit around her house to use.)  ADL Assistance: Independent (Prior to last hospitalization she was completely independent with ADLs. She reports since her recent return home she hasn't been able to perform on her own lately. Pt states daughter's were helping her once she fatigued. 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   Cognition   Cognition  Overall Cognitive Status: WFL    Objective          AROM RLE (degrees)  RLE AROM: WFL  AROM LLE (degrees)  LLE AROM : WFL  Strength RLE  Comment: grossly 4-/5  Strength LLE  Comment: grossly 4-/5  Strength RUE  Comment: Co evaluation with OT-see OT evaluation for full UE assessment  Strength LUE  Comment: Co evaluation with OT-see OT evaluation for full UE assessment     Sensation  Overall Sensation Status: WFL (Pt denies any numbness or tingling)  Bed mobility  Supine to Sit:  (Pt up to chair upon arrival and retired to chair at end of session)  Scooting: Minimal assistance (scooting assessed in chair)  Transfers  Sit to Stand: Minimal Assistance  Stand to sit: Minimal Assistance  Comment: Cues for hand placement as pt prefers to pull up on front of walker but to do so insists that writer  hold front of walker to stabilize. Attempted to assist pt in problem solving another method as her eventual goal is home and will not have another individual to assist in such a manner.   Ambulation  Ambulation?: Yes  More Ambulation?: No  Ambulation 1  Surface: level tile  Device: Rolling Walker  Assistance: Contact guard assistance  Quality of Gait: decreased endurance with encouragement to ambulate distance, unsteady, high reliance on RW  Gait Deviations: Slow Mellissa; Decreased step length; Decreased step height  Distance: 45ftx2  by a standing rest break  Stairs/Curb  Stairs?: No     Balance  Posture: Fair  Sitting - Static: Fair  Sitting - Dynamic: Fair; -  Standing - Static: Fair  Standing - Dynamic: Fair; -  Comments: standing balance assessed with RW        Plan   Plan  Times per week: 5-6x  Times per day: Daily  Current Treatment Recommendations: Strengthening, Balance Training, Functional Mobility Training, Transfer Training, Gait Training, Endurance Training, Stair training, Home Exercise Program, Safety Education & Training, Patient/Caregiver Education & Training  Safety Devices  Type of devices: Gait belt, Nurse notified, Call light within reach, Left in chair, Chair alarm in place  Restraints  Initially in place: No      AM-PAC Score  AM-PAC Inpatient Mobility Raw Score : 16 (11/15/21 1308)  AM-PAC Inpatient T-Scale Score : 40.78 (11/15/21 1308)  Mobility Inpatient CMS 0-100% Score: 54.16 (11/15/21 1308)  Mobility Inpatient CMS G-Code Modifier : CK (11/15/21 1308)          Goals  Short term goals  Time Frame for Short term goals: 14 visits  Short term goal 1: Pt to ambulate 150ft modified independently with RW  Short term goal 2: Pt to sit <> stand transfer independently  Short term goal 3: Pt to demonstrate independent bed mobility  Short term goal 4: Pt to ascend/descend 2 with one rail modified independently to allow for entrance/exit to home  Short term goal 5: Pt to tolerate 30 minutes of therapy for endurance       Therapy Time   Individual Concurrent Group Co-treatment   Time In 1056         Time Out 1130         Minutes 34         Timed Code Treatment Minutes: [address] [place],9D, PT

## 2021-11-15 NOTE — CARE COORDINATION
Case Management Initial Discharge Plan  aXnder Nunez,             Met with:patient daughter Nia Wood per phone to discuss discharge plans. Information verified: address, contacts, phone number, , insurance Yes  Insurance Provider: [facility]    Emergency Contact/Next of Kin name & number: Lili Tran daughter   Who are involved in patient's support system? children    PCP: Nataliia Moseley  Date of last visit: 2021 per Epic      Discharge Planning    Living Arrangements:  [address] 1 stories  2 stairs to climb to get into front door    Patient able to perform ADL's:Independent    Current Services (outpatient & in home)   DME equipment: cane, walker, CPAP  DME provider:     Is patient receiving oral anticoagulation therapy? No    Potential Assistance Needed:  N/A    Patient agreeable to home care: No  Sherrill of choice provided:  no    Prior SNF/Rehab Placement and Facility: yes - [facility]  Agreeable to SNF/Rehab: Yes  Sherrill of choice provided: yes     Evaluation: no    Expected Discharge date:  11/15/21    Patient expects to be discharged to: If home: is the family and/or caregiver wiling & able to provide support at home? no  Who will be providing this support? Lives alone    Follow Up Appointment: Best Day/ Time: Monday AM    Transportation provider: was driving self until a few weeks ago  Transportation arrangements needed for discharge: Yes    Readmission Risk              Risk of Unplanned Readmission:  36       Does patient have a readmission risk score greater than 14?: Yes  If yes, follow-up appointment must be made within 7 days of discharge. Goals of Care: infection management      Educated patient and daughter Nia Wood on transitional options, provided freedom of choice and are agreeable with plan      Discharge Plan: SNF/ECF, referrals to [address] [place] [address], and [employer].  Patient was recently d/c from [facility], had 6 day stay and then to [place] - home x 2 days with return to hospital. Per daughter patient still very weak and unable to care for self - new dx of metastatic spread of multiple myeloma. Palliative care referral made. Electronically signed by Ulysses Paganini, RN on 11/15/21 at 8:26 AM EST    1334 Call from Carlin Monroy at [facility], requested clinical to be faxed.  - Faxed per request

## 2021-11-15 NOTE — PROGRESS NOTES
[facility]  Office: [address], DO, Elina [place], DO, Tae Warner, DO, Anna Sabins Blood, DO, Juancarlos Ashford MD, Richar Finn MD, Kumar Raygoza MD, Debi Munoz MD, Dariana Marina MD, Ariel Mcneil MD, Junior Courtney MD, Radhames Mcdonald, DO, Tracie Martinez, DO, Kayla Younger MD,  Sadia Friday, DO, Lily Whitt MD, Tanner Zamarripa MD, Naila Salinas MD, Sandie Gomez MD, Bette Knight MD, Bhumi Jeronimo MD, Adeline Goldmann, MD, Andrew Flores, [facility], [facility], [facility], Helen Nurse, CNP, Son Aldridge, CNS, Ulises Scott, CNP, Elissa Gentile, CNP, Carter Quinn, CNP, Yolis Walker, CNP, Carie Harper, CNP, TIERNEY JimenezC, Edyta Llamas, [facility], Nataliia Garzaelor, CNP, Clark Albrecht, CNP, Marilu Lincoln, CNP, Consuelo Squibb, CNP, Lois Barber, CNP, Meek Buchanan, [facility], [facility], [address] [place] [address]    Progress Note    11/15/2021    7:32 AM    Name:   Winifred Dawson  MRN:     [id number]     Acct:      [de-identified]   Room:   [address] [place] [address] Day:  3  Admit Date:  11/12/2021  6:14 PM    PCP:   Cj Payan  Code Status:  DNR-CCA    Subjective:     C/C:   Chief Complaint   Patient presents with    Fatigue     pt was admitted to hospital in october- rehab s/p hospitalization, was d/c from [facility] 2 days ago- having increased fatigue      Interval History Status: improved. Patient seen and evaluated in room resting in chair in no acute distress. Has been n.p.o. for gallbladder ultrasound this morning, denies any chest pain, shortness of breath, nausea, vomiting, diarrhea, constipation, fever, chills, urinary symptoms.   States pain is well controlled    Brief History:     Homa Guerra a [de-identified] y.o. Non- / non  female who presents with Fatigue (pt was admitted to hospital in october- rehab s/p hospitalization, was d/c from [facility] 2 days ago- having increased fatigue )   and is admitted to the hospital for the management of PAOLA (acute kidney injury) ([facility] [address]    Patient reports he he was previously hospitalized in October for weakness.  She had a UTI and atrial flutter episode at that time. Willie Barber then went to [facility] for acute rehab and then to assisted living. Willie Barber was discharged home a couple days ago and she was experiencing increased weakness in her hands and was unable to ambulate well.  She has been able to bear weight on her legs, but not move them. Willie Barber has an extensive medical history that includes malignant melanoma with mets to her brain and her lung, arthritis in her shoulders, RAMON, vertigo, cystitis, asthma, and mitral valve regurg. Willie Barber sees Dr. Dennie Medina oncology.  According to the patient, she is supposed to start brain radiation next Friday for a total of 10 treatments.  She says her oncologist wanted her to come to the hospital so that she could be admitted and then discharged to a SNF for further strengthening.     While in ED, noted the patient's creatinine was 1.89, sodium 129, alk phos 423, ALT 71, AST 78.  She was admitted to the inpatient nursing unit for generalized weakness, PAOLA, suspected pneumonia seen on imaging, transaminitis, and leukocytosis (patient has been taking oral Decadron).  She is afebrile.       Review of Systems:     Constitutional:  negative for chills, fevers, sweats, + fatigue/weakness  Respiratory:  negative for cough, dyspnea on exertion, shortness of breath, wheezing  Cardiovascular:  negative for chest pain, chest pressure/discomfort, lower extremity edema, palpitations  Gastrointestinal:  negative for abdominal pain, constipation, diarrhea, nausea, vomiting  Neurological:  negative for dizziness, headache    Medications: Allergies: Allergies   Allergen Reactions    Povidone-Iodine Rash    Hydrochlorothiazide      Pt states she takes Maxzide at home without any problems.     Meperidine Itching    Naproxen Hives       Current Meds: Scheduled Meds:    heparin (porcine)  5,000 Units SubCUTAneous 3 times per day    albuterol sulfate HFA  2 puff Inhalation BID    allopurinol  300 mg Oral Daily    aspirin  81 mg Oral Daily    [Held by provider] folic acid-pyridoxine-cyancobalamin  1 tablet Oral Daily    budesonide-formoterol  2 puff Inhalation BID    [Held by provider] calcium elemental  500 mg Oral BID    Vitamin D  2,000 Units Oral Daily    dexamethasone  4 mg Oral 4x Daily    [Held by provider] folic acid  2 mg Oral Nightly    hydroxychloroquine  200 mg Oral BID    levothyroxine  88 mcg Oral Daily    [START ON 11/18/2021] methotrexate  15 mg Oral Weekly    [Held by provider] therapeutic multivitamin-minerals  1 tablet Oral Daily    [Held by provider] Niacinamide  1,500 mg Oral BID    pantoprazole  40 mg Oral QAM AC    vitamin C  500 mg Oral Daily    vitamin E  400 Units Oral Daily    sodium chloride flush  5-40 mL IntraVENous 2 times per day    levofloxacin  250 mg IntraVENous Q24H     Continuous Infusions:    sodium chloride 100 mL/hr at 11/14/21 1427    sodium chloride       PRN Meds: magnesium sulfate, melatonin, cetirizine, traMADol, sodium chloride flush, sodium chloride, ondansetron **OR** ondansetron    Data:     Past Medical History:   has a past medical history of Arthritis, Asthma, Back pain, Caffeine use, Carpal tunnel syndrome, Constipation, Cystitis, Dizziness, Gout, Hearing loss, History of blood transfusion, Hypertension, Malignant melanoma ([facility] [address] Meralgia paresthetica, Mitral valve regurgitation, Obesity, Osteoarthritis, Osteoporosis, PONV (postoperative nausea and vomiting), Sleep apnea, Thyroid disease, Urinary incontinence, Venous insufficiency, Vertigo, Weakness, and Wears hearing aid. Social History:   reports that she quit smoking about 46 years ago. She has a 4.25 pack-year smoking history. She has never used smokeless tobacco. She reports current alcohol use.  She reports that she does not use drugs. Family History:   Family History   Problem Relation Age of Onset    Heart Failure Mother     Heart Disease Father        Vitals:  BP (!) 119/57   Pulse 80   Temp 98 °F (36.7 °C) (Oral)   Resp 16   Ht 5' 3\" (1.6 m)   Wt 203 lb 4.2 oz (92.2 kg)   SpO2 92%   BMI 36.01 kg/m²   Temp (24hrs), Av.9 °F (36.6 °C), Min:97.7 °F (36.5 °C), Max:98.1 °F (36.7 °C)    No results for input(s): POCGLU in the last 72 hours. I/O (24Hr): Intake/Output Summary (Last 24 hours) at 11/15/2021 0732  Last data filed at 11/15/2021 0000  Gross per 24 hour   Intake 420 ml   Output 175 ml   Net 245 ml       Labs:  Hematology:  Recent Labs     2114 21  0556   WBC 21.3* 19.2* 18.2*   RBC 3.06* 2.79* 2.99*   HGB 9.7* 9.0* 9.5*   HCT 29.6* 27.4* 29.5*   MCV 97.0 98.4 98.8   MCH 31.7 32.2 31.7   MCHC 32.7 32.8 32.1   RDW 17.9* 18.1* 18.5*    386 352   MPV 8.8 9.0 8.6   INR 1.3 1.3  --      Chemistry:  Recent Labs     2114 21  1142 21  0556 11/15/21  0542   *   < > 133*  --  137 137   K 4.3   < > 4.2  --  3.9 3.8   CL 94*   < > 99  --  103 105   CO2 20   < > 20  --  19* 19*   GLUCOSE 230*   < > 219*  --  159* 169*   BUN 66*   < > 57*  --  50* 40*   CREATININE 1.89*   < > 1.52*  --  1.40* 1.01*   MG  --   --  1.2*  --   --   --    ANIONGAP 15   < > 14  --  15 13   LABGLOM 26*   < > 33*  --  36* 53*   GFRAA 31*   < > 40*  --  44* >60   CALCIUM 10.3   < > 9.6  --  9.7 8.9   TROPHS 24*  --   --   --   --   --    LACTACIDWB  --   --   --  NOT REPORTED  --   --     < > = values in this interval not displayed.      Recent Labs     21  0614 21  0556 11/15/21  0542   PROT 5.4* 5.9* 5.3*   LABALBU 3.0* 3.1* 2.9*   LABA1C 5.3  --   --    TSH 0.79  --   --    AST 80* 81* 86*   ALT 68* 71* 69*   ALKPHOS 354* 332* 303*   BILITOT 0.56 0.61 0.57     Lab Results   Component Value Date/Time    SPECIAL 20MLS LEFT ARM IV START 2021 08:47 PM     Lab Results   Component Value Date/Time    CULTURE NO GROWTH 3 DAYS 11/12/2021 08:47 PM       Radiology:  XR CHEST PORTABLE    Result Date: 11/12/2021  Right lower lobe mass again noted Peripheral bilateral airspace opacities could represent atypical pneumonia       Physical Examination:        General appearance:  alert, cooperative and no distress  Mental Status:  oriented to person, place and time and normal affect  Lungs:  clear to auscultation bilaterally, normal effort, faint posterior crackles that clear with coughing  Heart:  regular rate and rhythm, no murmur  Abdomen:  Obese, soft, nontender, nondistended, normal bowel sounds, no masses, hepatomegaly, splenomegaly  Extremities: + 1 BLE pitting edema, redness, tenderness in the calves  Skin:  no gross lesions, rashes, induration    Assessment:        Hospital Problems           Last Modified POA    * (Principal) PAOLA (acute kidney injury) ([facility] [address] 11/13/2021 Yes    Anemia 11/13/2021 Yes    Benign essential HTN 11/13/2021 Yes    Chronic renal impairment 11/13/2021 Yes    Generalized weakness 11/13/2021 Yes    Obesity (BMI 30-39.9) 11/13/2021 Yes    Malignant neoplasm of lung ([facility] [address] 11/13/2021 Yes    Malignant melanoma metastatic to brain ([facility] [address] 11/13/2021 Yes    Atypical pneumonia 11/13/2021 Yes    Leukocytosis 11/13/2021 Yes    Elevated LFTs 11/13/2021 Yes    Lactic acidosis 11/14/2021 Yes          Plan:        PAOLA:   - Significantly improved. - Patient continues on IV fluids. - Will discontinue p.o. intake is good    Normocytic normochromic anemia:   - Check iron studies.    - Hemoglobin 9.7 on admit    Primary malignant melanoma:   - With metastatic spread to the brain and lung  - consult palliative     Atypical pneumonia with right lower lobe lung mass:   - Lung mass is not a new finding.    - Continue antibiotics. - Awaiting sputum Gram stain    Elevated LFTs:   - Most likely secondary to lung and brain involvement of metastatic spread. - Avoid acetaminophen-containing products. - US gallbladder reviewed, negative for acute cholecystitis   -No further work-up, PET scan unavailable for review. Plan discussed with patient's daughter, plan for skilled nursing facility admission    Lactic acidosis: Metabolic in nature, downtrending. Primary hypertension: Currently stable off therapy    Chronically immunosuppressed state: On methotrexate    GI/DVT prophylaxis: Protonix, heparin    Consult palliative    Dispo:  -Skilled nursing facility tomorrow, pending pre-CERT. No further work-up. Patient daughter would like to pursue either hospice care or palliative consult which is already been placed. Patient still wanting to undergo palliative radiation treatments.         COBY Camilo - MARTINE  11/15/2021  7:32 AM

## 2021-11-15 NOTE — CARE COORDINATION
11/15/21 1124   Readmission Assessment   Number of Days since last admission? 8-30 days   Previous Disposition SNF   Who is being Interviewed Patient   What was the patient's/caregiver's perception as to why they think they needed to return back to the hospital? Not enough help at home   Did you visit your Primary Care Physician after you left the hospital, before you returned this time? Yes   Did you see a specialist, such as Cardiac, Pulmonary, Orthopedic Physician, etc. after you left the hospital? Yes   Who advised the patient to return to the hospital? Self-referral   Does the patient report anything that got in the way of taking their medications? No   In our efforts to provide the best possible care to you and others like you, can you think of anything that we could have done to help you after you left the hospital the first time, so that you might not have needed to return so soon?  Other (Comment)  (Need more therapy)

## 2021-11-15 NOTE — PLAN OF CARE
Problem: Falls - Risk of:  Goal: Will remain free from falls  Description: Will remain free from falls  Outcome: Ongoing   No falls/injuries this shift, bed in lowest position, brakes on, bed alarm on, call light in reach, side rails up x2   Problem: Skin Integrity:  Goal: Will show no infection signs and symptoms  Description: Will show no infection signs and symptoms  Outcome: Ongoing   No new skin breakdown noted, no signs/symptoms of infection, continue to monitor labwork including WBC, medications ordered   Problem: Pain:  Goal: Pain level will decrease  Description: Pain level will decrease  Outcome: Ongoing   Problem: Pain:  Goal: Control of chronic pain  Description: Control of chronic pain  Outcome: Ongoing   No signs/symptoms of pain noted, pain rating <3 on scale 0-10, pain controlled with medication/repositioning

## 2021-11-16 LAB
ALBUMIN SERPL-MCNC: 3 G/DL (ref 3.5–5.2)
ALBUMIN/GLOBULIN RATIO: 1.3 (ref 1–2.5)
ALP BLD-CCNC: 304 U/L (ref 35–104)
ALT SERPL-CCNC: 75 U/L (ref 5–33)
ANION GAP SERPL CALCULATED.3IONS-SCNC: 12 MMOL/L (ref 9–17)
AST SERPL-CCNC: 72 U/L
BILIRUB SERPL-MCNC: 0.58 MG/DL (ref 0.3–1.2)
BUN BLDV-MCNC: 36 MG/DL (ref 8–23)
BUN/CREAT BLD: ABNORMAL (ref 9–20)
CALCIUM SERPL-MCNC: 8.8 MG/DL (ref 8.6–10.4)
CHLORIDE BLD-SCNC: 105 MMOL/L (ref 98–107)
CO2: 20 MMOL/L (ref 20–31)
CREAT SERPL-MCNC: 0.73 MG/DL (ref 0.5–0.9)
GFR AFRICAN AMERICAN: >60 ML/MIN
GFR NON-AFRICAN AMERICAN: >60 ML/MIN
GFR SERPL CREATININE-BSD FRML MDRD: ABNORMAL ML/MIN/{1.73_M2}
GFR SERPL CREATININE-BSD FRML MDRD: ABNORMAL ML/MIN/{1.73_M2}
GLUCOSE BLD-MCNC: 171 MG/DL (ref 70–99)
LACTIC ACID, SEPSIS WHOLE BLOOD: NORMAL MMOL/L (ref 0.5–1.9)
LACTIC ACID, SEPSIS WHOLE BLOOD: NORMAL MMOL/L (ref 0.5–1.9)
LACTIC ACID, SEPSIS: 1.4 MMOL/L (ref 0.5–1.9)
LACTIC ACID, SEPSIS: 1.5 MMOL/L (ref 0.5–1.9)
POTASSIUM SERPL-SCNC: 3.8 MMOL/L (ref 3.7–5.3)
SODIUM BLD-SCNC: 137 MMOL/L (ref 135–144)
TOTAL PROTEIN: 5.4 G/DL (ref 6.4–8.3)

## 2021-11-16 PROCEDURE — 6360000002 HC RX W HCPCS: Performed by: NURSE PRACTITIONER

## 2021-11-16 PROCEDURE — 97116 GAIT TRAINING THERAPY: CPT

## 2021-11-16 PROCEDURE — 97535 SELF CARE MNGMENT TRAINING: CPT

## 2021-11-16 PROCEDURE — 97110 THERAPEUTIC EXERCISES: CPT

## 2021-11-16 PROCEDURE — 99232 SBSQ HOSP IP/OBS MODERATE 35: CPT | Performed by: INTERNAL MEDICINE

## 2021-11-16 PROCEDURE — 94760 N-INVAS EAR/PLS OXIMETRY 1: CPT

## 2021-11-16 PROCEDURE — 6360000002 HC RX W HCPCS: Performed by: INTERNAL MEDICINE

## 2021-11-16 PROCEDURE — 2580000003 HC RX 258: Performed by: NURSE PRACTITIONER

## 2021-11-16 PROCEDURE — 99222 1ST HOSP IP/OBS MODERATE 55: CPT | Performed by: NURSE PRACTITIONER

## 2021-11-16 PROCEDURE — 2060000000 HC ICU INTERMEDIATE R&B

## 2021-11-16 PROCEDURE — APPSS30 APP SPLIT SHARED TIME 16-30 MINUTES: Performed by: PHYSICIAN ASSISTANT

## 2021-11-16 PROCEDURE — 36415 COLL VENOUS BLD VENIPUNCTURE: CPT

## 2021-11-16 PROCEDURE — 6370000000 HC RX 637 (ALT 250 FOR IP): Performed by: NURSE PRACTITIONER

## 2021-11-16 PROCEDURE — 80053 COMPREHEN METABOLIC PANEL: CPT

## 2021-11-16 PROCEDURE — 83605 ASSAY OF LACTIC ACID: CPT

## 2021-11-16 RX ORDER — LEVOFLOXACIN 5 MG/ML
500 INJECTION, SOLUTION INTRAVENOUS EVERY 24 HOURS
Status: DISCONTINUED | OUTPATIENT
Start: 2021-11-16 | End: 2021-11-16

## 2021-11-16 RX ADMIN — LEVOFLOXACIN 500 MG: 5 INJECTION, SOLUTION INTRAVENOUS at 08:45

## 2021-11-16 RX ADMIN — LEVOTHYROXINE SODIUM 88 MCG: 88 TABLET ORAL at 05:10

## 2021-11-16 RX ADMIN — OXYCODONE HYDROCHLORIDE AND ACETAMINOPHEN 500 MG: 500 TABLET ORAL at 08:45

## 2021-11-16 RX ADMIN — ALBUTEROL SULFATE 2 PUFF: 90 AEROSOL, METERED RESPIRATORY (INHALATION) at 10:26

## 2021-11-16 RX ADMIN — ALLOPURINOL 300 MG: 300 TABLET ORAL at 08:45

## 2021-11-16 RX ADMIN — BUDESONIDE AND FORMOTEROL FUMARATE DIHYDRATE 2 PUFF: 160; 4.5 AEROSOL RESPIRATORY (INHALATION) at 20:53

## 2021-11-16 RX ADMIN — HYDROXYCHLOROQUINE SULFATE 200 MG: 200 TABLET, FILM COATED ORAL at 12:06

## 2021-11-16 RX ADMIN — HEPARIN SODIUM 5000 UNITS: 5000 INJECTION INTRAVENOUS; SUBCUTANEOUS at 06:04

## 2021-11-16 RX ADMIN — DEXAMETHASONE SODIUM PHOSPHATE 4 MG: 4 INJECTION, SOLUTION INTRAMUSCULAR; INTRAVENOUS at 17:48

## 2021-11-16 RX ADMIN — BUDESONIDE AND FORMOTEROL FUMARATE DIHYDRATE 2 PUFF: 160; 4.5 AEROSOL RESPIRATORY (INHALATION) at 10:26

## 2021-11-16 RX ADMIN — HYDROXYCHLOROQUINE SULFATE 200 MG: 200 TABLET, FILM COATED ORAL at 20:14

## 2021-11-16 RX ADMIN — SODIUM CHLORIDE, PRESERVATIVE FREE 10 ML: 5 INJECTION INTRAVENOUS at 08:45

## 2021-11-16 RX ADMIN — DEXAMETHASONE SODIUM PHOSPHATE 4 MG: 4 INJECTION, SOLUTION INTRAMUSCULAR; INTRAVENOUS at 12:06

## 2021-11-16 RX ADMIN — DEXAMETHASONE SODIUM PHOSPHATE 4 MG: 4 INJECTION, SOLUTION INTRAMUSCULAR; INTRAVENOUS at 20:15

## 2021-11-16 RX ADMIN — HEPARIN SODIUM 5000 UNITS: 5000 INJECTION INTRAVENOUS; SUBCUTANEOUS at 20:14

## 2021-11-16 RX ADMIN — ALBUTEROL SULFATE 2 PUFF: 90 AEROSOL, METERED RESPIRATORY (INHALATION) at 20:53

## 2021-11-16 RX ADMIN — TRAMADOL HYDROCHLORIDE 50 MG: 50 TABLET, COATED ORAL at 02:13

## 2021-11-16 RX ADMIN — DEXAMETHASONE SODIUM PHOSPHATE 4 MG: 4 INJECTION, SOLUTION INTRAMUSCULAR; INTRAVENOUS at 08:45

## 2021-11-16 RX ADMIN — PANTOPRAZOLE SODIUM 40 MG: 40 TABLET, DELAYED RELEASE ORAL at 06:04

## 2021-11-16 RX ADMIN — ASPIRIN 81 MG: 81 TABLET, CHEWABLE ORAL at 08:45

## 2021-11-16 RX ADMIN — Medication 2000 UNITS: at 08:45

## 2021-11-16 RX ADMIN — LEVOFLOXACIN 250 MG: 5 INJECTION, SOLUTION INTRAVENOUS at 00:43

## 2021-11-16 RX ADMIN — SODIUM CHLORIDE, PRESERVATIVE FREE 10 ML: 5 INJECTION INTRAVENOUS at 20:19

## 2021-11-16 ASSESSMENT — PAIN SCALES - GENERAL
PAINLEVEL_OUTOF10: 0
PAINLEVEL_OUTOF10: 7
PAINLEVEL_OUTOF10: 0

## 2021-11-16 NOTE — DISCHARGE INSTR - COC
Continuity of Care Form    Patient Name: Yoav Ivey   :    MRN:  [id number]    [facility] date:  2021  Discharge date:  2021    Code Status Order: DNR-CCA   Advance Directives:      Admitting Physician:  No admitting provider for patient encounter.   PCP: Dieudonne Rodriguez [facility] ([place], [place]),  [address]    Discharging Nurse: [place] [address] Unit/Room#: 845/787-41  Discharging Unit Phone Number: [phone]    Emergency Contact:   Extended Emergency Contact Information  Primary Emergency Contact: Lupis Medellin, [address] Phone: [phone]  Mobile Phone: [phone]  Relation: Child  Secondary Emergency Contact: Jean Claude Gregory[address] Phone: [phone]  Mobile Phone: [phone]  Relation: Child    Past Surgical History:  Past Surgical History:   Procedure Laterality Date    CARPAL TUNNEL RELEASE Bilateral     CATARACT REMOVAL Bilateral     CATARACT REMOVAL WITH IMPLANT Bilateral     COLONOSCOPY      CYSTOSCOPY      and dilation    FOOT SURGERY Right     HYSTERECTOMY      JOINT REPLACEMENT Left     TKA    KNEE ARTHROSCOPY Left     KNEE SURGERY Left     [place] TOTAL KNEE ARTHROPLASTY Right 2018    KNEE TOTAL ARTHROPLASTY performed by Bijal Rollins MD at [facility]         Immunization History:   Immunization History   Administered Date(s) Administered    COVID-19, ANAHI Urbina, 30mcg/0.3mL 2021, 2021       Active Problems:  Patient Active Problem List   Diagnosis Code    Urgency of urination R39.15    Anemia D64.9    Apnea R06.81    Arthropathia M12.9    Benign essential HTN I10    Chronic renal impairment N18.9    DDD (degenerative disc disease), lumbar M51.36    Disorder of bone and articular cartilage M89.9, M94.9    Disorder of lipid metabolism E78.9    Edema R60.9    Shortness of breath R06.02    Enthesopathy of hip M76.899    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, 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 Gout M10.9    Gouty arthropathy M10.9    Hyponatremia E87.1    Hypoactive thyroid E03.9    Elevated fasting blood sugar R73.01    Inflammation of sacroiliac joint (HCC) M46.1    Insomnia G47.00    Iron deficiency anemia D50.9    Primary localized osteoarthritis M19.91    Arthritis, degenerative, localized, primary, hand M19.049    Lumbosacral neuritis M54.17    Increased MCV D75.89    Menopausal symptom N95.1    Obstructive apnea G47.33    Arthritis of knee, degenerative M17.10    Osteoporosis M81.0    Urinary incontinence R32    Varicose veins of lower extremity I83.90    Chronic low back pain M54.50, G89.29    Lumbar disc herniation with radiculopathy M51.16    Acquired spondylolisthesis M43.10    Lumbar stenosis with neurogenic claudication M48.062    Lumbar radicular pain M54.16    Coagulopathy (HCC) D68.9    Generalized weakness R53.1    Acute cystitis without hematuria N30.00    UTI due to Klebsiella and Enterobacter N39.0, B96.89    Obesity (BMI 30-39. 9) E66.9    Mass of right lung R91.8    Malignant neoplasm of lung (HCC) C34.90    Chronic pain of both shoulders M25.511, G89.29, M25.512    Malignant melanoma metastatic to brain [facility]) C79.31    Atypical pneumonia J18.9    Elevated LFTs R79.89    Transaminitis R74.01       Isolation/Infection:   Isolation            No Isolation          Patient Infection Status       Infection Onset Added Last Indicated Last Indicated By Review Planned Expiration Resolved Resolved By    None active    Resolved    COVID-19 Rule Out 11/13/21 11/13/21 11/15/21 Respiratory Panel, Molecular, with COVID-19 (Restricted: peds pts or suitable admitted adults) (Ordered)   11/15/21 Rule-Out Test Resulted            Nurse Assessment:  Last Vital Signs: BP (!) 155/89   Pulse 87   Temp 98.1 °F (36.7 °C) (Oral)   Resp 18   Ht 5' 3\" (1.6 m)   Wt 203 lb 4.2 oz (92.2 kg)   SpO2 95%   BMI 36.01 kg/m²     Last documented pain score (0-10 scale): Pain Level: 0  Last Weight:    Wt Readings from Last 1 Encounters:   11/16/21 203 lb 4.2 oz (92.2 kg)     Mental Status:  oriented and alert    IV Access:  - None    Nursing Mobility/ADLs:  Walking   Assisted  Transfer  Assisted  Bathing  Assisted  Dressing  Assisted  Toileting  [facility]   whole    Wound Care Documentation and Therapy:  Incision 07/17/18 Knee Right (Active)   Number of days: 1218        Elimination:  Continence: Bowel: yes  Bladder: Yes  Urinary Catheter: None   Colostomy/Ileostomy/Ileal Conduit: No       Date of Last BM: 11/15/2021    Intake/Output Summary (Last 24 hours) at 11/16/2021 1705  Last data filed at 11/15/2021 2317  Gross per 24 hour   Intake --   Output 500 ml   Net -500 ml     I/O last 3 completed shifts:  In: -   Out: 500 [Urine:500]    Safety Concerns: At Risk for Falls    Impairments/Disabilities:      Hearing    Nutrition Therapy:  Current Nutrition Therapy:   - Oral Diet:  General    Routes of Feeding: Oral  Liquids: No Restrictions  Daily Fluid Restriction: no  Last Modified Barium Swallow with Video (Video Swallowing Test): not done    Treatments at the Time of Hospital Discharge:   Respiratory Treatments: C pap at night  Oxygen Therapy:  is not on home oxygen therapy.   Ventilator:    - No ventilator support  - CPAP   only when sleeping    Rehab Therapies: Occupational Therapy  Weight Bearing Status/Restrictions: No weight bearing restirctions  Other Medical Equipment (for information only, NOT a DME order):  walker  Other Treatments: none    Patient's personal belongings (please select all that are sent with patient):  Glasses, Hearing Aides bilateral, Jewelry    RN SIGNATURE:  Electronically signed by Arina Navas RN on 11/17/21 at 7:43 AM EST    CASE MANAGEMENT/SOCIAL WORK SECTION    Inpatient Status Date: 11/16/2021    Readmission Risk Assessment Score:  Readmission Risk              Risk of Unplanned Readmission:  32           Discharging to Facility/ Agency   Name: [facility]  Address:  Phone: [phone]  Fax:    Dialysis Facility (if applicable)   Name:  Address:  Dialysis Schedule:  Phone:  Fax:    / signature: Electronically signed by Jr Jin RN on 11/16/21 at 6:03 PM EST    PHYSICIAN SECTION    Prognosis: Fair    Condition at Discharge: Stable    Rehab Potential (if transferring to Rehab): Fair    Recommended Labs or Other Treatments After Discharge: Follow-up with radiation oncology  Follow-up for radiation    follow-up with oncologist  PT and OT eval and treat  Monitor vitals      Physician Certification: I certify the above information and transfer of Rosaura Padron  is necessary for the continuing treatment of the diagnosis listed and that she requires [facility] for less 30 days.      Update Admission H&P: No change in H&P    PHYSICIAN SIGNATURE:  Electronically signed by Maria Del Carmen Kuhn MD on 11/16/21 at 5:08 PM EST

## 2021-11-16 NOTE — PLAN OF CARE
Problem: Falls - Risk of:  Goal: Will remain free from falls  Description: Will remain free from falls  11/16/2021 0237 by Arina Dennison RN  Outcome: Ongoing  11/15/2021 1604 by Danny Leblanc RN  Outcome: Ongoing  Goal: Absence of physical injury  Description: Absence of physical injury  11/16/2021 0237 by Arina Dennison RN  Outcome: Ongoing  11/15/2021 1604 by Danny Leblanc RN  Outcome: Ongoing     Problem: Skin Integrity:  Goal: Will show no infection signs and symptoms  Description: Will show no infection signs and symptoms  11/16/2021 0237 by Arina Dennison RN  Outcome: Ongoing  11/15/2021 1604 by Danny Leblanc RN  Outcome: Ongoing  Goal: Absence of new skin breakdown  Description: Absence of new skin breakdown  11/16/2021 0237 by Arina Dennison RN  Outcome: Ongoing  11/15/2021 1604 by Danny Leblanc RN  Outcome: Ongoing     Problem: Pain:  Goal: Pain level will decrease  Description: Pain level will decrease  11/16/2021 0237 by Arina Dennison RN  Outcome: Ongoing  11/15/2021 1604 by Danny Leblanc RN  Outcome: Ongoing  Goal: Control of acute pain  Description: Control of acute pain  11/16/2021 0237 by Arina Dennison RN  Outcome: Ongoing  11/15/2021 1604 by Danny Leblanc RN  Outcome: Ongoing  Goal: Control of chronic pain  Description: Control of chronic pain  11/16/2021 0237 by Arina Dennison RN  Outcome: Ongoing  11/15/2021 1604 by Danny Leblanc RN  Outcome: Ongoing     Problem: Musculor/Skeletal Functional Status  Goal: Highest potential functional level  11/16/2021 0237 by Arina Dennison RN  Outcome: Ongoing  11/15/2021 1604 by Danny Leblanc RN  Outcome: Ongoing     Problem: Respiratory:  Goal: Ability to maintain a clear airway will improve  Description: Ability to maintain a clear airway will improve  11/16/2021 0237 by Arina Dennison RN  Outcome: Ongoing  11/15/2021 1604 by Danny Leblanc RN  Outcome: Ongoing  11/15/2021 1338 by Marly Painting RCP  Outcome: Ongoing

## 2021-11-16 NOTE — PROGRESS NOTES
[facility]  Office: [address], DO, Cojoseerin Sandie, DO, Cassandra Macias, DO, Win Garsia Blood, DO, Liborio De La Fuente MD, Beverly Sung MD, Kely Nicholson MD, Yousif Bowens MD, June Olivera MD, Gerardo Mcneil MD, Philipp Hammonds MD, Patti Alfred, DO, Jaquan Martinez DO, Natasha Claire MD,  uMnir Rangel DO, Lucy Devine MD, Tracey Morales MD, Ashleigh Ochoa MD, Reyna Turpin MD, Manohar Granados MD, Ayse Porter MD, Justin Gonzalez MD, Mimi Poole, [facility], [facility], CNP, Madhavi Gunter, CNP, [place] Munguia, CNS, Mehrdad Cordero, [facility], Yinka Crouch, CNP, Chata Byrd, [facility], Geovany Tran, [facility], Jeyson Ashley, CNP, Rowan Oppenheim, PA-C, Silverio Alatorre, [facility], Reyes García, CNP, Matilde Browning, CNP, Kaylene Montes, CNP, Harpal Teresa, CNP, Jennifer Ngo, CNP, Mirella Cortés, CNP, Joseph Carson, [address] [place] [address]    Progress Note    11/16/2021    9:10 AM    Name:   Nemesio Betancur  MRN:     [id number]     Acct:      [de-identified]   Room:   [address] [place] [address] Day:  4  Admit Date:  11/12/2021  6:14 PM    PCP:   Lorena Novak  Code Status:  DNR-CCA    Subjective:     C/C:   Chief Complaint   Patient presents with    Fatigue     pt was admitted to hospital in october- rehab s/p hospitalization, was d/c from [facility] 2 days ago- having increased fatigue      Interval History Status: unchanged. Patient seen and evaluated. She has elected to continue to pursue palliative radiation following discussion with palliative care. She has no new complaints. She is awaiting placement. She is stable for discharge.      Brief History:     Andi Modi a [de-identified] y.o. Non- / non  female who presents with Fatigue (pt was admitted to hospital in october- rehab s/p hospitalization, was d/c from [facility] 2 days ago- having increased fatigue )   and is admitted to the hospital for the management of PAOLA (acute kidney injury) [facility]).     Patient reports he he was previously hospitalized in October for weakness.  She had a UTI and atrial flutter episode at that time. Mildred Smith then went to [facility] for acute rehab and then to assisted living. Mildred Smith was discharged home a couple days ago and she was experiencing increased weakness in her hands and was unable to ambulate well.  She has been able to bear weight on her legs, but not move them. Mildred Smith has an extensive medical history that includes malignant melanoma with mets to her brain and her lung, arthritis in her shoulders, RAMON, vertigo, cystitis, asthma, and mitral valve regurg. Mildred Smith sees Dr. Jerald Lopez oncology.  According to the patient, she is supposed to start brain radiation next Friday for a total of 10 treatments.  She says her oncologist wanted her to come to the hospital so that she could be admitted and then discharged to a SNF for further strengthening.     While in ED, noted the patient's creatinine was 1.89, sodium 129, alk phos 423, ALT 71, AST 78.  She was admitted to the inpatient nursing unit for generalized weakness, PAOLA, suspected pneumonia seen on imaging, transaminitis, and leukocytosis (patient has been taking oral Decadron).  She is afebrile.       Review of Systems:     Constitutional:  negative for chills, fevers, sweats, + fatigue/weakness  Respiratory:  negative for cough, dyspnea on exertion, shortness of breath, wheezing  Cardiovascular:  negative for chest pain, chest pressure/discomfort, lower extremity edema, palpitations  Gastrointestinal:  negative for abdominal pain, constipation, diarrhea, nausea, vomiting  Neurological:  negative for dizziness, headache    Medications: Allergies: Allergies   Allergen Reactions    Povidone-Iodine Rash    Hydrochlorothiazide      Pt states she takes Maxzide at home without any problems.     Meperidine Itching    Naproxen Hives       Current Meds:   Scheduled Meds:    heparin (porcine)  5,000 Units SubCUTAneous 3 times per day    albuterol sulfate HFA  2 puff Inhalation BID    allopurinol  300 mg Oral Daily    aspirin  81 mg Oral Daily    [Held by provider] folic acid-pyridoxine-cyancobalamin  1 tablet Oral Daily    budesonide-formoterol  2 puff Inhalation BID    [Held by provider] calcium elemental  500 mg Oral BID    Vitamin D  2,000 Units Oral Daily    dexamethasone  4 mg Oral 4x Daily    [Held by provider] folic acid  2 mg Oral Nightly    hydroxychloroquine  200 mg Oral BID    levothyroxine  88 mcg Oral Daily    [START ON 11/18/2021] methotrexate  15 mg Oral Weekly    [Held by provider] therapeutic multivitamin-minerals  1 tablet Oral Daily    [Held by provider] Niacinamide  1,500 mg Oral BID    pantoprazole  40 mg Oral QAM AC    vitamin C  500 mg Oral Daily    vitamin E  400 Units Oral Daily    sodium chloride flush  5-40 mL IntraVENous 2 times per day     Continuous Infusions:    sodium chloride       PRN Meds: cyclobenzaprine, magnesium sulfate, melatonin, cetirizine, traMADol, sodium chloride flush, sodium chloride, ondansetron **OR** ondansetron    Data:     Past Medical History:   has a past medical history of Arthritis, Asthma, Back pain, Caffeine use, Carpal tunnel syndrome, Constipation, Cystitis, Dizziness, Gout, Hearing loss, History of blood transfusion, Hypertension, Malignant melanoma (HCC), Meralgia paresthetica, Mitral valve regurgitation, Obesity, Osteoarthritis, Osteoporosis, PONV (postoperative nausea and vomiting), Sleep apnea, Thyroid disease, Urinary incontinence, Venous insufficiency, Vertigo, Weakness, and Wears hearing aid. Social History:   reports that she quit smoking about 46 years ago. She has a 4.25 pack-year smoking history. She has never used smokeless tobacco. She reports current alcohol use. She reports that she does not use drugs.      Family History:   Family History   Problem Relation Age of Onset    Heart Failure Mother     Heart Disease Father Obese, soft, nontender, nondistended, normal bowel sounds, no masses, hepatomegaly, splenomegaly  Extremities: + 1 BLE pitting edema, redness, tenderness in the calves  Skin:  no gross lesions, rashes, induration    Assessment:        Hospital Problems           Last Modified POA    Anemia 11/15/2021 Yes    Benign essential HTN 11/15/2021 Yes    Chronic renal impairment 11/15/2021 Yes    Generalized weakness 11/15/2021 Yes    Obesity (BMI 30-39.9) 11/15/2021 Yes    Malignant neoplasm of lung ([facility] [address] 11/15/2021 Yes    Malignant melanoma metastatic to brain ([facility] [address] 11/15/2021 Yes    Atypical pneumonia 11/15/2021 Yes    Elevated LFTs 11/15/2021 Yes          Plan:        PAOLA:   - resolved    Normocytic normochromic anemia:   - Hemoglobin 9.7 on admit    Primary malignant melanoma:   - With metastatic spread to the brain and lung  - palliative care consulted. Pt to continue with plan of palliative radiation    Atypical pneumonia with right lower lobe lung mass:   - Lung mass is not a new finding.    - d/c abx, no suggestion of active pneumonia    Elevated LFTs:   - US gallbladder reviewed, negative for acute cholecystitis   -No further work-up, PET scan unavailable for review. Primary hypertension: Currently stable off therapy    GI/DVT prophylaxis: Protonix, heparin    Dispo:  -Skilled nursing facility pending pre-CERT. Fran Shin PA-C  11/16/2021  9:10 AM     I have seen and examined Mago Lira and the fuentes elements of all parts of the encounter have been performed by me. I agree with the assessment, plan and orders as documented by the Advanced Practice Provider. Any modifications to the exam findings and the plan of treatment is as below and the final version is my approved version of the assessment.     Additional Comments: No acute issues overnight  Palliative care was consulted  Discharge planning when approved    Electronically signed by Tani Gonzalez MD on 11/16/2021 at 10:14 PM

## 2021-11-16 NOTE — PROGRESS NOTES
Physical Therapy  Facility/Department: [facility] SURG ICU  Daily Treatment Note  NAME: Juvenal Camp  : 1940  MRN: [id number]    Date of Service: 2021    Discharge Recommendations:  Patient would benefit from continued therapy after discharge   PT Equipment Recommendations  Equipment Needed: No  Other: Pt owns a RW and writer will recommend continued use of device    Assessment   Body structures, Functions, Activity limitations: Decreased functional mobility ; Decreased strength; Decreased safe awareness; Decreased endurance; Decreased balance  Assessment: Pt ambulated with RW and CGA 45' x 2 with seated rest break between bouts due to fatigue and SOB. Pt with decreased endurance and decreased bilateral UE and LE strength limiting mobility as well as functional tasks. Pt would be unsafe to return to prior living arrangements based on demonstrated mobility without strict  assistance and will benefit from further therapy at discharge. Prognosis: Fair  PT Education: Transfer Training; Functional Mobility Training; Gait Training  REQUIRES PT FOLLOW UP: Yes  Activity Tolerance  Activity Tolerance: Patient limited by fatigue; Patient limited by endurance     Patient Diagnosis(es): The primary encounter diagnosis was Generalized weakness. Diagnoses of Pneumonia of both lower lobes due to infectious organism, Leukocytosis, unspecified type, Elevated LFTs, and PAOLA (acute kidney injury) ([address] were also pertinent to this visit.      has a past medical history of Arthritis, Asthma, Back pain, Caffeine use, Carpal tunnel syndrome, Constipation, Cystitis, Dizziness, Gout, Hearing loss, History of blood transfusion, Hypertension, Malignant melanoma ([address] Meralgia paresthetica, Mitral valve regurgitation, Obesity, Osteoarthritis, Osteoporosis, PONV (postoperative nausea and vomiting), Sleep apnea, Thyroid disease, Urinary incontinence, Venous insufficiency, Vertigo, Weakness, and Wears hearing aid.   has a past surgical history that includes Tonsillectomy; Hysterectomy; knee surgery (Left); Foot surgery (Right); Cystoscopy; Cataract removal (Bilateral); joint replacement (Left, 2011); Knee arthroscopy (Left); Carpal tunnel release (Bilateral); Colonoscopy; Cataract removal with implant (Bilateral); and pr total knee arthroplasty (Right, 7/17/2018). Restrictions  Restrictions/Precautions  Restrictions/Precautions: Fall Risk  Required Braces or Orthoses?: No  Position Activity Restriction  Other position/activity restrictions: Up with assistance  Subjective   General  Response To Previous Treatment: Patient with no complaints from previous session. Family / Caregiver Present: No  Subjective  Subjective: Pt up to chair upon arrival. RN agreeable to therapy. Pt just finished visit with hospice but declined their services. Pain Screening  Patient Currently in Pain: Denies  Pain Assessment  Pain Assessment: 0-10  Pain Level: 0  Vital Signs  Patient Currently in Pain: Denies       Orientation  Orientation  Overall Orientation Status: Within Functional Limits  Cognition      Objective   Bed mobility  Comment: Not assessed as pt up in chair at start and end of session. Transfers  Sit to Stand: Contact guard assistance; Minimal Assistance  Stand to sit: Contact guard assistance  Comment: Pt with verbal cues to scoot to edge of chair and proper hand placement for sit to stand.   Ambulation  Ambulation?: Yes  More Ambulation?: No  Ambulation 1  Surface: level tile  Device: Rolling Walker  Assistance: Stand by assistance; Contact guard assistance  Quality of Gait: decreased endurance, high reliance on RW, slow pace  Distance: 45ftx2  by a seated rest break  Stairs/Curb  Stairs?: No     Balance  Sitting - Static: Fair; +  Sitting - Dynamic: Fair  Standing - Static: Fair; +  Standing - Dynamic: Fair  Comments: standing balance assessed with RW  Exercises  Hip Flexion: 15x (B) LE  Hip Abduction: 15x (B) LE  Knee Long Arc Quad: 15x (B) LE  Ankle Pumps: 15x                        G-Code     OutComes Score                                                     AM-PAC Score             Goals  Short term goals  Time Frame for Short term goals: 14 visits  Short term goal 1: Pt to ambulate 150ft modified independently with RW  Short term goal 2: Pt to sit <> stand transfer independently  Short term goal 3: Pt to demonstrate independent bed mobility  Short term goal 4: Pt to ascend/descend 2 with one rail modified independently to allow for entrance/exit to home  Short term goal 5: Pt to tolerate 30 minutes of therapy for endurance    Plan    Plan  Times per week: 5-6x  Times per day: Daily  Current Treatment Recommendations: Strengthening, Balance Training, Functional Mobility Training, Transfer Training, Gait Training, Endurance Training, Stair training, Home Exercise Program, Safety Education & Training, Patient/Caregiver Education & Training  Safety Devices  Type of devices: Gait belt, Nurse notified, Call light within reach, Left in chair, Chair alarm in place  Restraints  Initially in place: No     Therapy Time   Individual Concurrent Group Co-treatment   Time In 1500         Time Out 1525         Minutes 25         Timed Code Treatment Minutes: [address] [facility], PT

## 2021-11-16 NOTE — PROGRESS NOTES
Occupational Therapy  Facility/Department: [facility] SUR[facility]  Daily Treatment Note  NAME: Alo Malave  : 1940  MRN: [id number]    Date of Service: 2021      Discharge Recommendations:  Patient would benefit from continued therapy after discharge      Assessment   Performance deficits / Impairments: Decreased functional mobility ; Decreased ADL status; Decreased ROM; Decreased strength; Decreased safe awareness; Decreased endurance; Decreased cognition; Decreased balance; Decreased high-level IADLs; Decreased coordination; Decreased fine motor control  Assessment: Pt currently limited in performing ADL tasks and functional transfers/functional mobility due to above noted deficits, most significantly decreased activity tolerance. Pt to benefit from continued therapy services while hospitalized and at discharge to maximize pt's safety and independence in performing functional tasks. Pt unsafe to return to prior living arrangements; 24hr assistance and continued OT services recommended at discharge. Prognosis: Good  Decision Making: Medium Complexity  OT Education: OT Role; Plan of Care; Precautions; ADL Adaptive Strategies; Transfer Training; Equipment; Energy Conservation (pt verbalized understanding however particular with activity/techniques and non-compliant with incorporating most education)  Activity Tolerance  Activity Tolerance: Patient limited by fatigue; Patient limited by pain  Safety Devices  Safety Devices in place: Yes  Type of devices: Call light within reach; Nurse notified; Left in chair  Restraints  Initially in place: No         Patient Diagnosis(es): The primary encounter diagnosis was Generalized weakness. Diagnoses of Pneumonia of both lower lobes due to infectious organism, Leukocytosis, unspecified type, Elevated LFTs, and PAOLA (acute kidney injury) ([facility] [address] were also pertinent to this visit.       has a past medical history of Arthritis, Asthma, Back pain, Caffeine use, Carpal tunnel syndrome, Constipation, Cystitis, Dizziness, Gout, Hearing loss, History of blood transfusion, Hypertension, Malignant melanoma ([facility] [address] Meralgia paresthetica, Mitral valve regurgitation, Obesity, Osteoarthritis, Osteoporosis, PONV (postoperative nausea and vomiting), Sleep apnea, Thyroid disease, Urinary incontinence, Venous insufficiency, Vertigo, Weakness, and Wears hearing aid. has a past surgical history that includes Tonsillectomy; Hysterectomy; knee surgery (Left); Foot surgery (Right); Cystoscopy; Cataract removal (Bilateral); joint replacement (Left, 2011); Knee arthroscopy (Left); Carpal tunnel release (Bilateral); Colonoscopy; Cataract removal with implant (Bilateral); and pr total knee arthroplasty (Right, 7/17/2018). Restrictions  Restrictions/Precautions  Restrictions/Precautions: Fall Risk  Required Braces or Orthoses?: No  Position Activity Restriction  Other position/activity restrictions: Up with assistance     Subjective   General  Patient assessed for rehabilitation services?: Yes  Response to previous treatment: Patient reporting fatigue but able to participate  Family / Caregiver Present: No  General Comment  Comments: RN ok'd for therapy this AM. Pt agreeable to participate in session and pleasant/cooperative throughout. Vital Signs  Patient Currently in Pain: Denies     Orientation  Orientation  Overall Orientation Status: Within Functional Limits     Objective    ADL  Grooming: Increased time to complete;  Moderate assistance (assistance reaching for objects on countertop and to turn faucet on/off due to decreased [address] and decreased BUE ROM/strength, assistance with opening containers for soap/toothpaste/lotion due to decreased [facility] and decreased B hand ROM/strength)  Toileting: Increased time to complete; Maximum assistance (to perform LB clothing mngt, pericare/bottom hygiene, and toilet transfer; use of R grab bar)  Comments: Increased time/effort due to complaint of fatigue following minimal exertion, mildly unsteady with decreased dynamic sitting/standing balance, mod VCs due to decreased safety awareness. Balance  Sitting Balance: Stand by assistance  Standing Balance: Minimal assistance    Functional Mobility  Functional - Mobility Device: Rolling Walker  Activity: To/from bathroom (performed 2x)  Assist Level: Minimal assistance  Functional Mobility Comments: Mildly unsteady however no true LOB. Increased time/effort to perform with complaint of fatigue following ~2 minute bouts and requiring a seated rest break. Min VCs for safety awareness and RW mngt throughout. Bed mobility  Scooting: Minimal assistance (performed 4x in chair)  Comment: Pt up to chair at start and end of session. Transfers  Sit to stand: Minimal assistance (performed 3x from chair, 1x from toilet, 2x from shower shower)  Stand to sit: Minimal assistance  Transfer Comments: Increased time/effort. Attempted to educate pt in proper hand placement/safety awareness with use of RW during functional transfers, pt not compliant and performed with B hands placed on RW.       Cognition  Overall Cognitive Status: Exceptions  Attention Span: Attends with cues to redirect  Safety Judgement: Decreased awareness of need for assistance  Problem Solving: Assistance required to identify errors made; Assistance required to correct errors made  Insights: Decreased awareness of deficits         Plan   Plan  Times per week: 5-6x/wk  Times per day: Daily  Current Treatment Recommendations: Balance Training, Functional Mobility Training, Endurance Training, Safety Education & Training, Patient/Caregiver Education & Training, Equipment Evaluation, Education, & procurement, Self-Care / ADL, Home Management Training, Strengthening, ROM      AM-PAC Score   AM-[facility] Inpatient Daily Activity Raw Score: 16 (11/15/21 1254)  AM-PAC Inpatient ADL T-Scale Score : 35.96 (11/15/21 1254)  ADL Inpatient CMS 0-100% Score: 53.32

## 2021-11-16 NOTE — CARE COORDINATION
[facility] ICU Quality Flow/Interdisciplinary Rounds Progress Note    Quality Flow Rounds held on November 16, 2021 at [address] Attending:  Bedside Nurse, , Respiratory Therapy, Physical Therapy and Occupational Therapy    Anticipated Discharge Date:  Expected Discharge Date: 11/15/21    Anticipated Discharge Disposition: SNF    Readmission Risk              Risk of Unplanned Readmission:  32           Discussed patient goal for the day, patient clinical progression, and barriers to discharge. The following Goal(s) of the Day/Commitment(s) have been identified:  Activity Progression  PT/OT, Referral - Palliative Care and [facility] reviewing clinical      Sanchez Macedo RN  November 16, 2021    Message received from Dejon Frazier at [facility], precert has been received and patient can admit on 11/17. Patient updated, call to patient daughter Virgil Odom to update, states she will transport patient to SNF, plans to  at 12N - left message on SW phone at [facility] - call in am to confirm time of admission is ok - [phone]     Nursing section of [facility] needs complete - nursing aware. HENS complete. Fax AVS/[facility] when complete to [phone].

## 2021-11-16 NOTE — CONSULTS
Palliative Care Inpatient Consult    NAME:  Ariane Cazares RECORD NUMBER:  [id number]  AGE: [de-identified] y.o. GENDER: female  : 1940  TODAY'S DATE:  2021    Reasons for Consultation:    Symptom and/or pain management  Provision of information regarding PC and/or hospice philosophies  Complex, time-intensive communication and interdisciplinary psychosocial support  Clarification of goals of care and/or assistance with difficult decision-making  Guidance in regards to resources and transition(s)    Members of PC team contributing to this consultation are :  Spencer Lo, Palliative Care APRN-CNP  History of Present Illness     The patient is a [de-identified] y.o. Non- / non  female who presents with Fatigue (pt was admitted to hospital in october- rehab s/p hospitalization, was d/c from [place] 2 days ago- having increased fatigue )      Referred to Palliative Care by   [x] Physician   [] Nursing  [] Family Request   [] Other:       She was admitted to the primary service for Generalized weakness [R53.1]  Elevated LFTs [R79.89]  PAOLA (acute kidney injury) ([address] [N17.9]  Pneumonia of both lower lobes due to infectious organism [J18.9]  Leukocytosis, unspecified type [D72.829]. Her hospital course has been associated with PAOLA (acute kidney injury) ([address] elevated liver enzymes, anemia, metastatic melanoma, and atypical pneumonia. The patient has a complicated medical history and has been hospitalized since 2021  6:14 PM. I reviewed patients EMR, spoke to RN, and patient/daughters to collect history. Patient has history of Obesity, HTN, Gout, Arthritis, RAMON, Asthma, Thyroid disease, [place], mitral valve regurgitation, Factor XII deficiency, venous insufficiency, and meralgia paresthetica. Dr. Jany Talley office called, and last office note was faxed to me. Patient was recently DC on 10/22 and was found to have UTI, and was treated with ATB's then sent to SNF at [place].  She also was noted to have SA on EKG, and cardio was planning holter at [place]. Patient was discharged home two days ago from [facility] days ago, and presents to ED with complaints of worsening fatigue. Patient was recently diagnosed with brain and lung metastasis form melanoma of unknown origin. She reports to have consult here at [address] Thursday for palliative brain radiation. Her Oncologist is Dr. Nicholas Boyd. Patient initially underwent bronch with Bx but this was not conclusive, and she was sent for IR CT guided lung Bx that was consistent with malignant melanoma. Patient has been complaining of dizziness, and relates this to inner ear issues and has been seeing ENT. MRI of brain unfortunately showed several small new contrast enhancing lesions that are consistent with brain mets, and she was started on steroids. PET scan revealed 2 small metastatic lesions within the left cerebral hemisphere, multiple metabolic lesions in the neck, chest, abdomen, pelvis, liver, and numerous bone lesions. Patient reports that she has discussed starting systemic treatment with immunotherapy with Oncologist. Patient was sent to ED after Oncologist visit d/t daughter reporting that patient is not safe at home with confusion, and frequent falls. Patients admitting pertinent labs include; WBC 21.3 on steroids, Hgb 9.7, glucose 230, Bun 66, Cr 1.89, Na 129, Alk phos 423, ALT 71, AST 78, lactic acid 3.1, troponin 24, and UA negative. CXR revealed RLL mass, and peripheral bilateral airspace opacities. EKG revealed SR with Occasional PVCs. Patient was admitted, and IV fluids/ATB's initiated. Patients code status is DNRCC-A. She is awaiting PreCert for DC to SNF. Palliative care consulted for review of goals, code status, symptom management, and support.      11/16 pertinent labs include; Bun 36, Alk Phos 304, ALT 75, AST 72, albumin 3.0,     Active Hospital Problems    Diagnosis Date Noted    Malignant melanoma metastatic to brain [facility]) [C79.31] 11/13/2021    Atypical pneumonia [J18.9] 2021    Elevated LFTs [R79.89]     Malignant neoplasm of lung (HCC) [C34.90]     Obesity (BMI 30-39. 9) [E66.9] 10/20/2021    Generalized weakness [R53.1] 10/20/2021    Benign essential HTN [I10] 2017    Chronic renal impairment [N18.9] 2017    Anemia [D64.9] 2017       PAST MEDICAL HISTORY      Diagnosis Date    Arthritis     Asthma     Back pain     Caffeine use     2-3 cups coffee and 2-3 cups tea and 1-2 cans pop daily    Carpal tunnel syndrome     Constipation     Cystitis     Dizziness     Gout     Hearing loss     History of blood transfusion     Hypertension     Malignant melanoma ([address]     Meralgia paresthetica     Mitral valve regurgitation     Obesity     Osteoarthritis     Osteoporosis     PONV (postoperative nausea and vomiting)     Sleep apnea     Thyroid disease     HYPOTHYROID    Urinary incontinence     Venous insufficiency     Vertigo     Weakness     Wears hearing aid     BILATERAL       PAST SURGICAL HISTORY  Past Surgical History:   Procedure Laterality Date    CARPAL TUNNEL RELEASE Bilateral     CATARACT REMOVAL Bilateral     CATARACT REMOVAL WITH IMPLANT Bilateral     COLONOSCOPY      CYSTOSCOPY      and dilation    FOOT SURGERY Right     HYSTERECTOMY      JOINT REPLACEMENT Left     TKA    KNEE ARTHROSCOPY Left     KNEE SURGERY Left     ID TOTAL KNEE ARTHROPLASTY Right 2018    KNEE TOTAL ARTHROPLASTY performed by Dede David MD at [address] [place] [address]  Social History     Tobacco Use    Smoking status: Former Smoker     Packs/day: 0.25     Years: 17.00     Pack years: 4.25     Quit date:      Years since quittin.9    Smokeless tobacco: Never Used   Vaping Use    Vaping Use: Never used   Substance Use Topics    Alcohol use: Yes     Comment: rare    Drug use: No       ALLERGIES  Allergies   Allergen Reactions    Povidone-Iodine Rash    Hydrochlorothiazide      Pt mg by mouth daily      methotrexate (RHEUMATREX) 2.5 MG chemo tablet Take 15 mg by mouth once a week At bedtime on thursday      Cyanocobalamin (VITAMIN B12 PO) Take by mouth once a week On thursdays      VITAMIN D PO Take 2,000 % by mouth every morning      hydroxychloroquine (PLAQUENIL) 200 MG tablet Take 200 mg by mouth 2 times daily      b complex vitamins capsule Take 1 capsule by mouth daily      vitamin C (ASCORBIC ACID) 500 MG tablet Take 500 mg by mouth daily      Cholecalciferol (VITAMIN D) 2000 units CAPS capsule Take 1 capsule by mouth daily       vitamin E 400 UNIT capsule Take 400 Units by mouth daily      Multiple Vitamins-Minerals (THERAPEUTIC MULTIVITAMIN-MINERALS) tablet Take 1 tablet by mouth daily      ondansetron (ZOFRAN ODT) 4 MG disintegrating tablet Take 1 tablet by mouth every 8 hours as needed for Nausea 12 tablet 0    hydrocortisone 2.5 % cream Apply topically 2 times daily Apply topically 2 times daily.       cetirizine (ZYRTEC) 10 MG tablet Take 10 mg by mouth daily      levothyroxine (SYNTHROID) 88 MCG tablet Take 88 mcg by mouth Daily      omeprazole (PRILOSEC) 20 MG delayed release capsule Take 40 mg by mouth 2 times daily       polyethylene glycol (GLYCOLAX) packet Take 17 g by mouth daily as needed for Constipation      diphenhydrAMINE (BENADRYL) 25 MG capsule Take 25 mg by mouth nightly as needed       allopurinol (ZYLOPRIM) 300 MG tablet Take 300 mg by mouth daily         Data         BP (!) 123/58   Pulse 82   Temp 97.5 °F (36.4 °C) (Oral)   Resp 18   Ht 5' 3\" (1.6 m)   Wt 203 lb 4.2 oz (92.2 kg)   SpO2 95%   BMI 36.01 kg/m²     Wt Readings from Last 3 Encounters:   11/16/21 203 lb 4.2 oz (92.2 kg)   10/24/21 224 lb 13.9 oz (102 kg)   08/19/21 233 lb (105.7 kg)        Code Status: DNR-CCA     ADVANCED CARE PLANNING:  Patient has capacity for medical decisions: yes  Health Care Power of : yes  Living Will: not asked     Personal, Social, and Family that treatment will make patients QOL poor faster. They report that even with treatment prognosis is not good, but patient still wants to pursue. Patient reports not believing that she will progress that fast, and reiterates that she is hoping to make it to see these weddings. We discussed the goals of palliative treatment, and how immunotherapy is fairly tolerated by most. We discussed the differences in palliative care vs hospice care. Patient reports not wanting hospice care. She reports not being ready for that, but will do once she feels it is needed. Patient is upset, and talks about how she just wants to spend Thanksgiving with her family that is out of state. She reports thinking that she can travel by car. Family reports this is unrealistic and we discussed how she may need to start radiation treatment soon but to discuss with radiation Oncologist. Patient reports being agreeable to palliative care, and we discussed outpatient options. Family is familiar with [facility] in [address], and would like Send[facility] Pelletier. RN informed to send referral prior to DC. I discussed patients code status, and discussed the difference with [facility]. I discussed CPR, and intubation in more detail. Patient does not want CPR, but is still wanting intubation. She reports as condition worsens she will reassess. I discussed spiritual needs, and patient reports that she is a . She reports working up until Dx. Family reports patient was very independent prior to this and lived alone. I discussed how maybe family can assist patient meet her wishes/goals with family celebration for upcoming grandchildren weddings. Patient reports this being her ultimate wish prior to passing. I offered them support at this time and they were appreciative of my visit.      Education/support to staff  Education/support to family  Education/support to patient  Discharge planning/helping to coordinate care  Communications with primary service  Providing support for coping/adaptation/distress of family  Providing support for coping/adaptation/distress of patient  Discussing meaning/purpose   Specific spiritual beliefs/practices  Decision making regarding life prolonging treatment  Decisional capacity assessed  Continue with current plan of care  Clarification of medical condition to patient and family  Code status clarified: [facility]  Code status clarified: [facility]  Palliative care orders introduced  Provided information about hospice  Validating patient/family distress  Recognizing, reflecting, and empathizing with family members' anticipatory grief  Recognizing, reflecting, and empathizing with the patient's anticipatory grief  Patient is A/O and is wanting to seek palliative treatment. She wants code status to remain DNRCC-A. She has POA and this is on file here. She is planning to have consultation Thursday with Radiation Oncologist here. Family is concerned, and is not wanting patient to do treatment if this will not prolong her life much more with treatment. They are worried about worsening symptoms from treatment. All agreeable to [facility] palliative care at [facility] for symptom management. Principle Problem/Diagnosis:  PAOLA (acute kidney injury) ([address]    Additional Assessments:   Principal Problem (Resolved):    PAOLA (acute kidney injury) ([address]  Active Problems:    Anemia    Benign essential HTN    Chronic renal impairment    Generalized weakness    Obesity (BMI 30-39. 9)    Malignant neoplasm of lung (HCC)    Malignant melanoma metastatic to brain ([address]    Atypical pneumonia    Elevated LFTs  Resolved Problems:    Leukocytosis    Lactic acidosis    1- Symptom management/ pain control     Pain Assessment:  Pain is controlled with current analgesics. Medication(s) being used: narcotic analgesics including Ultram PRN.                 Anxiety:  difficulty concentrating, fatigue, irritable                          Dyspnea:  none                          Fatigue: Generalized weakness    Other: We feel the patient symptoms are being controlled. her current regimen is reviewed by myself and discussed with the staff. 2- Goals of care evaluation   The patient goals of care are live longer, improve or maintain function/quality of life, spiritual needs, strengthening relationships and support for family/caregiver   Goals of care discussed with:    [] Patient independently    [x] Patient and Family    [] Family or Healthcare DPOA independently    [] Unable to discuss with patient, family/DPOA not present    3- Code Status  DNR-CCA    4- Other recommendations   - We will continue to provide comfort and support to the patient and the family  Please call with any palliative questions or concerns. Palliative Care Team is available via perfect serve or via phone. Palliative Care will continue to follow Ms. Ambrosio's care as needed. Thank you for allowing Palliative Care to participate in the care of Ms. Ambrosio . This note has been dictated by dragon, typing errors may be a possibility. The total time I spent in seeing the patient, discussing goals of care, advanced directives, code status and other major issues was more than 60 minutes      Electronically signed by   COBY Horn CNP  Palliative Care Team  on 11/16/2021 at 12:54 PM    Palliative Care can be reached via [employer].

## 2021-11-17 ENCOUNTER — HOSPITAL ENCOUNTER (OUTPATIENT)
Dept: RADIATION ONCOLOGY | Age: 81
Discharge: HOME | End: 2021-11-17
Payer: MEDICARE

## 2021-11-17 VITALS
HEART RATE: 86 BPM | DIASTOLIC BLOOD PRESSURE: 64 MMHG | SYSTOLIC BLOOD PRESSURE: 125 MMHG | WEIGHT: 203.26 LBS | HEIGHT: 63 IN | RESPIRATION RATE: 18 BRPM | TEMPERATURE: 98.2 F | OXYGEN SATURATION: 96 % | BODY MASS INDEX: 36.02 KG/M2

## 2021-11-17 LAB
ALBUMIN SERPL-MCNC: 3.1 G/DL (ref 3.5–5.2)
ALBUMIN/GLOBULIN RATIO: 1.4 (ref 1–2.5)
ALP BLD-CCNC: 303 U/L (ref 35–104)
ALT SERPL-CCNC: 77 U/L (ref 5–33)
ANION GAP SERPL CALCULATED.3IONS-SCNC: 13 MMOL/L (ref 9–17)
AST SERPL-CCNC: 67 U/L
BILIRUB SERPL-MCNC: 0.59 MG/DL (ref 0.3–1.2)
BUN BLDV-MCNC: 37 MG/DL (ref 8–23)
BUN/CREAT BLD: ABNORMAL (ref 9–20)
CALCIUM SERPL-MCNC: 9 MG/DL (ref 8.6–10.4)
CHLORIDE BLD-SCNC: 106 MMOL/L (ref 98–107)
CO2: 20 MMOL/L (ref 20–31)
CREAT SERPL-MCNC: 0.74 MG/DL (ref 0.5–0.9)
GFR AFRICAN AMERICAN: >60 ML/MIN
GFR NON-AFRICAN AMERICAN: >60 ML/MIN
GFR SERPL CREATININE-BSD FRML MDRD: ABNORMAL ML/MIN/{1.73_M2}
GFR SERPL CREATININE-BSD FRML MDRD: ABNORMAL ML/MIN/{1.73_M2}
GLUCOSE BLD-MCNC: 148 MG/DL (ref 70–99)
POTASSIUM SERPL-SCNC: 4.1 MMOL/L (ref 3.7–5.3)
SODIUM BLD-SCNC: 139 MMOL/L (ref 135–144)
TOTAL PROTEIN: 5.3 G/DL (ref 6.4–8.3)

## 2021-11-17 PROCEDURE — APPNB30 APP NON BILLABLE TIME 0-30 MINS: Performed by: PHYSICIAN ASSISTANT

## 2021-11-17 PROCEDURE — 80053 COMPREHEN METABOLIC PANEL: CPT

## 2021-11-17 PROCEDURE — 6360000002 HC RX W HCPCS: Performed by: NURSE PRACTITIONER

## 2021-11-17 PROCEDURE — 2580000003 HC RX 258: Performed by: NURSE PRACTITIONER

## 2021-11-17 PROCEDURE — 6370000000 HC RX 637 (ALT 250 FOR IP): Performed by: NURSE PRACTITIONER

## 2021-11-17 PROCEDURE — 36415 COLL VENOUS BLD VENIPUNCTURE: CPT

## 2021-11-17 PROCEDURE — 99223 1ST HOSP IP/OBS HIGH 75: CPT | Performed by: RADIOLOGY

## 2021-11-17 RX ADMIN — TRAMADOL HYDROCHLORIDE 50 MG: 50 TABLET, COATED ORAL at 10:07

## 2021-11-17 RX ADMIN — DEXAMETHASONE SODIUM PHOSPHATE 4 MG: 4 INJECTION, SOLUTION INTRAMUSCULAR; INTRAVENOUS at 10:06

## 2021-11-17 RX ADMIN — OXYCODONE HYDROCHLORIDE AND ACETAMINOPHEN 500 MG: 500 TABLET ORAL at 10:06

## 2021-11-17 RX ADMIN — TRAMADOL HYDROCHLORIDE 50 MG: 50 TABLET, COATED ORAL at 00:18

## 2021-11-17 RX ADMIN — ASPIRIN 81 MG: 81 TABLET, CHEWABLE ORAL at 10:06

## 2021-11-17 RX ADMIN — HEPARIN SODIUM 5000 UNITS: 5000 INJECTION INTRAVENOUS; SUBCUTANEOUS at 06:01

## 2021-11-17 RX ADMIN — LEVOTHYROXINE SODIUM 88 MCG: 88 TABLET ORAL at 06:00

## 2021-11-17 RX ADMIN — CYCLOBENZAPRINE 10 MG: 10 TABLET, FILM COATED ORAL at 00:18

## 2021-11-17 RX ADMIN — Medication 2000 UNITS: at 10:06

## 2021-11-17 RX ADMIN — SODIUM CHLORIDE, PRESERVATIVE FREE 10 ML: 5 INJECTION INTRAVENOUS at 10:11

## 2021-11-17 RX ADMIN — ALLOPURINOL 300 MG: 300 TABLET ORAL at 10:06

## 2021-11-17 RX ADMIN — PANTOPRAZOLE SODIUM 40 MG: 40 TABLET, DELAYED RELEASE ORAL at 06:00

## 2021-11-17 ASSESSMENT — PAIN DESCRIPTION - DESCRIPTORS: DESCRIPTORS: ACHING

## 2021-11-17 ASSESSMENT — PAIN DESCRIPTION - FREQUENCY: FREQUENCY: CONTINUOUS

## 2021-11-17 ASSESSMENT — PAIN DESCRIPTION - ORIENTATION: ORIENTATION: RIGHT

## 2021-11-17 ASSESSMENT — PAIN SCALES - GENERAL
PAINLEVEL_OUTOF10: 7
PAINLEVEL_OUTOF10: 0
PAINLEVEL_OUTOF10: 0
PAINLEVEL_OUTOF10: 7

## 2021-11-17 ASSESSMENT — PAIN DESCRIPTION - PAIN TYPE: TYPE: CHRONIC PAIN

## 2021-11-17 ASSESSMENT — PAIN DESCRIPTION - LOCATION: LOCATION: SHOULDER

## 2021-11-17 NOTE — DISCHARGE SUMMARY
[facility]  Office: [address], DO, Rashid Singh, DO, [facility], DO, Chris Pichardo Blood, DO, Sofia Bright MD, Halima Sam MD, Darin Frazier MD, Kimmie Paz MD, Israel Larsen MD, Charlotte Nelson MD, Radha Alston MD, Trice Ramirez, DO, Oliva Greenfield, DO, Rosemarie Strauss MD,  Michele Smith DO, Fiorella Parks MD, Daniela Nicholas MD, Bhavani Cadet MD, Janice Soto MD, Edwin Leon MD, Danya Valle MD, Lisbeth Ramos MD, Tiago Garcia [facility], [facility], [facility], Curt Todd, CNP, Bill Sidhu, [facility], Mica Grullon, Ada Velez, CNP, Baron Lesch, CNP, Lloyd Millan, [facility], Mesfin Moore, CNP, Rupinder Harley PA-C, Denver Parks, [facility], Octavio Dasilva, CNP, Cecile Noel, CNP, Rafael Jackson, CNP, Danya Brewer, CNP, Fausto Moreno, CNP, Kaylee Vidal, CNP, Chika Cespedes, [facility]    Discharge Summary     Patient ID: Juvenal Camp  :     MRN: [id number]     ACCOUNT:  [de-identified]   Patient's PCP: Cameron Parker  Admit Date: 2021   Discharge Date: 2021    Length of Stay: 5  Code Status:  DNR-CCA  Admitting Physician: No admitting provider for patient encounter. Discharge Physician: Sunny Jason     Active Discharge Diagnoses:     Hospital Problem Lists:  Principal Problem (Resolved):    PAOLA (acute kidney injury) ([facility]  Active Problems:    Anemia    Benign essential HTN    Chronic renal impairment    Generalized weakness    Obesity (BMI 30-39. 9)    Malignant neoplasm of lung ([facility]    Malignant melanoma metastatic to brain ([facility]    Pneumonia of both lower lobes due to infectious organism    Elevated LFTs  Resolved Problems:    Leukocytosis    Lactic acidosis      Admission Condition:  poor     Discharged Condition: fair    Hospital Stay:     Hospital Course:      Aguila walters [de-identified] y.o. Non- / non  female who presents with Fatigue (pt was admitted to hospital in october- rehab s/p hospitalization, was d/c from [place] 2 days ago- having increased fatigue )   and is admitted to the hospital for the management of PAOLA (acute kidney injury) ([facility]    Patient reports he he was previously hospitalized in October for weakness.  She had a UTI and atrial flutter episode at that time. Audrey Keller then went to [facility] for acute rehab and then to assisted living. Audrey Keller was discharged home a couple days ago and she was experiencing increased weakness in her hands and was unable to ambulate well.  She has been able to bear weight on her legs, but not move them. Audrey Keller has an extensive medical history that includes malignant melanoma with mets to her brain and her lung, arthritis in her shoulders, RAMON, vertigo, cystitis, asthma, and mitral valve regurg. Audrey Keller sees Dr. Melanie Ortega oncology.  According to the patient, she is supposed to start brain radiation next Friday for a total of 10 treatments.  She says her oncologist wanted her to come to the hospital so that she could be admitted and then discharged to a SNF for further strengthening.     While in ED, noted the patient's creatinine was 1.89, sodium 129, alk phos 423, ALT 71, AST 78.  She was admitted to the inpatient nursing unit for generalized weakness, PAOLA, suspected pneumonia seen on imaging, transaminitis, and leukocytosis (patient has been taking oral Decadron).  She is afebrile.     Significant therapeutic interventions:      Normocytic normochromic anemia:   - Hemoglobin 9.7 on admit     Primary malignant melanoma:   - With metastatic spread to the brain and lung  - palliative care consulted.  Pt to continue with plan of palliative radiation     Atypical pneumonia with right lower lobe lung mass:   - Lung mass is not a new finding.    - d/c abx, no suggestion of active pneumonia     Elevated LFTs:   - US gallbladder reviewed, negative for acute cholecystitis   -No further work-up, PET scan unavailable for review.      Primary hypertension: Currently stable off therapy    Significant Diagnostic Studies:   Labs / Micro:  CBC:   Lab Results   Component Value Date    WBC 18.2 11/14/2021    RBC 2.99 11/14/2021    HGB 9.5 11/14/2021    HCT 29.5 11/14/2021    MCV 98.8 11/14/2021    MCH 31.7 11/14/2021    MCHC 32.1 11/14/2021    RDW 18.5 11/14/2021     11/14/2021     BMP:    Lab Results   Component Value Date    GLUCOSE 148 11/17/2021     11/17/2021    K 4.1 11/17/2021     11/17/2021    CO2 20 11/17/2021    ANIONGAP 13 11/17/2021    BUN 37 11/17/2021    CREATININE 0.74 11/17/2021    BUNCRER NOT REPORTED 11/17/2021    CALCIUM 9.0 11/17/2021    LABGLOM >60 11/17/2021    GFRAA >60 11/17/2021    GFR      11/17/2021    GFR NOT REPORTED 11/17/2021     HFP:    Lab Results   Component Value Date    PROT 5.3 11/17/2021     CMP:    Lab Results   Component Value Date    GLUCOSE 148 11/17/2021     11/17/2021    K 4.1 11/17/2021     11/17/2021    CO2 20 11/17/2021    BUN 37 11/17/2021    CREATININE 0.74 11/17/2021    ANIONGAP 13 11/17/2021    ALKPHOS 303 11/17/2021    ALT 77 11/17/2021    AST 67 11/17/2021    BILITOT 0.59 11/17/2021    LABALBU 3.1 11/17/2021    ALBUMIN 1.4 11/17/2021    LABGLOM >60 11/17/2021    GFRAA >60 11/17/2021    GFR      11/17/2021    GFR NOT REPORTED 11/17/2021    PROT 5.3 11/17/2021    CALCIUM 9.0 11/17/2021     PT/INR:    Lab Results   Component Value Date    PROTIME 13.1 11/13/2021    INR 1.3 11/13/2021     PTT:   Lab Results   Component Value Date    APTT 30.0 11/12/2021     FLP:    Lab Results   Component Value Date    CHOL 122 10/21/2021    CHOL 145 08/08/2017    TRIG 56 10/21/2021    HDL 56 10/21/2021     U/A:    Lab Results   Component Value Date    COLORU Yellow 11/15/2021    TURBIDITY Clear 11/15/2021    SPECGRAV 1.025 11/15/2021    HGBUR NEGATIVE 11/15/2021    PHUR 6.0 11/15/2021    PROTEINU NEGATIVE 11/15/2021    GLUCOSEU 2+ 11/15/2021    KETUA NEGATIVE 11/15/2021    BILIRUBINUR NEGATIVE 11/15/2021    UROBILINOGEN Normal 11/15/2021    NITRU NEGATIVE 11/15/2021    LEUKOCYTESUR NEGATIVE 11/15/2021     TSH:    Lab Results   Component Value Date    TSH 0.79 11/13/2021     Radiology:  US GALLBLADDER RUQ    Result Date: 11/15/2021  1. Mildly limited visibility of the liver due to overlying bowel gas and rib spaces. The liver is markedly heterogeneous with multiple mass-like lesions identified. Primary consideration is for hepatic metastases. Given reported leukocytosis, hepatic abscess could be considered as well. Further evaluation with dedicated CT or MRI is recommended. 2. No evidence of acute cholecystitis. XR CHEST PORTABLE    Result Date: 11/12/2021  Right lower lobe mass again noted Peripheral bilateral airspace opacities could represent atypical pneumonia       Consultations:    Consults:     Final Specialist Recommendations/Findings:   IP CONSULT TO CASE MANAGEMENT  IP CONSULT TO PALLIATIVE CARE      The patient was seen and examined on day of discharge and this discharge summary is in conjunction with any daily progress note from day of discharge. Discharge plan:     Disposition: To a non-[facility] facility    Physician Follow Up:   Carin [address] [place] [address]  [phone]    In 1 week  for follow up after hospitalization    [facility].   [facility] [address] [id number] [phone]           Discharge Medications:      Medication List      START taking these medications    melatonin 3 MG Tabs tablet  Take 1 tablet by mouth nightly as needed (sleep)        CONTINUE taking these medications    allopurinol 300 MG tablet  Commonly known as: ZYLOPRIM     aspirin 81 MG chewable tablet     b complex vitamins capsule     budesonide-formoterol 160-4.5 MCG/ACT Aero  Commonly known as: SYMBICORT     cetirizine 10 MG tablet  Commonly known as: ZYRTEC     dexamethasone 1 MG/ML solution  Commonly known as: DECADRON     diphenhydrAMINE 25 MG capsule  Commonly known as: BENADRYL     hydrocortisone 2.5 % cream     levothyroxine 88 MCG tablet  Commonly known as: SYNTHROID     methotrexate 2.5 MG chemo tablet  Commonly known as: RHEUMATREX     omeprazole 20 MG delayed release capsule  Commonly known as: PRILOSEC     ondansetron 4 MG disintegrating tablet  Commonly known as: Zofran ODT  Take 1 tablet by mouth every 8 hours as needed for Nausea     Plaquenil 200 MG tablet  Generic drug: hydroxychloroquine     polyethylene glycol 17 g packet  Commonly known as: GLYCOLAX     therapeutic multivitamin-minerals tablet     traMADol 50 MG tablet  Commonly known as: ULTRAM     Ventolin  (90 Base) MCG/ACT inhaler  Generic drug: albuterol sulfate HFA     VITAMIN B12 PO     vitamin C 500 MG tablet  Commonly known as: ASCORBIC ACID     vitamin D 50 MCG (2000 UT) Caps capsule     VITAMIN D PO     vitamin E 400 UNIT capsule        STOP taking these medications    calcium carbonate 500 MG Tabs tablet  Commonly known as: OSCAL     Cinnamon 500 MG Caps     CoQ10 100 MG Caps     FISH OIL PO     FOLIC ACID PO     Glucosamine 1500 Complex Caps     lisinopril 10 MG tablet  Commonly known as: PRINIVIL;ZESTRIL     Niacinamide 500 MG Tbcr     predniSONE 5 MG tablet  Commonly known as: DELTASONE     Resveratrol 100 MG Caps     triamterene-hydroCHLOROthiazide 37.5-25 MG per tablet  Commonly known as: MAXZIDE-25     TURMERIC PO           Where to Get Your Medications      Information about where to get these medications is not yet available    Ask your nurse or doctor about these medications  melatonin 3 MG Tabs tablet         No discharge procedures on file. Time Spent on discharge is 20 minutes in patient examination, evaluation, counseling as well as medication reconciliation, prescriptions for required medications, discharge plan and follow up.     Electronically signed by   Carrol Bergeron PA-C  11/17/2021  8:00 AM      Thank you  Blanca Comment for the opportunity to be involved in this patient's care.

## 2021-11-17 NOTE — PLAN OF CARE
Problem: Falls - Risk of:  Goal: Will remain free from falls  Description: Will remain free from falls  Outcome: Ongoing  Goal: Absence of physical injury  Description: Absence of physical injury  Outcome: Ongoing     Problem: Skin Integrity:  Goal: Will show no infection signs and symptoms  Description: Will show no infection signs and symptoms  Outcome: Ongoing  Goal: Absence of new skin breakdown  Description: Absence of new skin breakdown  Outcome: Ongoing     Problem: Pain:  Goal: Pain level will decrease  Description: Pain level will decrease  Outcome: Ongoing  Goal: Control of acute pain  Description: Control of acute pain  Outcome: Ongoing  Goal: Control of chronic pain  Description: Control of chronic pain  Outcome: Ongoing     Problem: Musculor/Skeletal Functional Status  Goal: Highest potential functional level  Outcome: Ongoing     Problem: Respiratory:  Goal: Ability to maintain a clear airway will improve  Description: Ability to maintain a clear airway will improve  Outcome: Ongoing

## 2021-11-17 NOTE — FLOWSHEET NOTE
SPIRITUAL CARE PROGRESS NOTE    Spiritual Assessment: Writer visited with Ms. Marshall Dawn before she discharged to another facility. Ms. Marshall Dawn was sitting in the bedside chair. She told writer that she would be leaving and going to another facility. She talked about her end of life plans which she has discussed with her [place] . She spoke of her  theme and plans. She talked about some of her concerns and acknowledged her feelings about things not being finished. Daughter arrived and joined the conversation. Daughter assured Pt that Pt's daughter was attending to some of Pt's concerns. Patient was receptive to prayer and voiced her prayer intentions. Intervention: Writer provided supportive presence and explored Pt's coping and needs; inquired about Pt's sources of support and strength; offered empathy and words of encouragement and support; prayed for Pt. Outcome: Ms. Marshall Dawn talked about her  and her beliefs. She acknowledged her feelings about matters that are not addressed. She received prayer. She thanked writer for the support. Plan: As Patient will be discharged from the hospital, there is no plan for a follow up visit. 21 1245   Encounter Summary   Services provided to: Patient and family together; Patient   Referral/Consult From: [facility] Family members; Children; Friends/neighbors; Confucianism/delbert community   Continue Visiting   (21)   Complexity of Encounter Moderate   Length of Encounter 15 minutes   Spiritual Assessment Completed Yes   Routine   Type Follow up   Spiritual/Nondenominational   Type Spiritual support   Assessment Calm; Approachable   Intervention Active listening; Explored feelings, thoughts, concerns; Explored coping resources; Sustaining presence/ Ministry of presence;  Discussed belief system/Pentecostalism practices/delbert; Discussed death; Discussed relationship with God; Discussed meaning/purpose; Prayer   Outcome Receptive;  Hopeful; Encouraged; Coping; Expressed feelings/needs/concerns; Engaged in conversation; Expressed gratitude     Electronically signed by Billy Nicholas, Oncology Outpatient [facility] 03, [address] [facility] Radiation Oncology  11/17/2021  12:47 PM

## 2021-11-17 NOTE — ACP (ADVANCE CARE PLANNING)
Advance Care Planning     Advance Care Planning (ACP) Physician/NP/PA Conversation    Date of Conversation: 11/12/2021  Conducted with: Patient with [address] Decision Maker:      Primary Decision Maker: Dannie Guevara - [phone]    Secondary Decision Maker: Jez Dow - [phone]   #3 Sister Eloisa Aranda here to complete Healthcare Decision Makers including selection of the Healthcare Decision Maker Relationship (ie \"Primary\")  Today we documented Decision Maker(s) consistent with ACP documents on file. Care Preferences:    Hospitalization: \"If your health worsens and it becomes clear that your chance of recovery is unlikely, what would be your preference regarding hospitalization? \"  The patient would prefer hospitalization. Ventilation: \"If you were unable to breath on your own and your chance of recovery was unlikely, what would be your preference about the use of a ventilator (breathing machine) if it was available to you? \"  The patient would desire the use of a ventilator. Resuscitation: \"In the event your heart stopped as a result of an underlying serious health condition, would you want attempts made to restart your heart, or would you prefer a natural death? \"  No, do NOT attempt to resuscitate.     treatment goals, benefit/burden of treatment options, ventilation preferences, hospitalization preferences, resuscitation preferences, hospice care and Palliative care    Conversation Outcomes / Follow-Up Plan:  ACP in process - information provided, considering goals and options  Reviewed DNR/DNI and patient confirms current DNR status - completed forms on file (place new order if needed)    Length of Voluntary ACP Conversation in minutes:  16 minutes    Tobias Collet, APRN - CNP

## 2021-11-17 NOTE — CONSULTS
[address]            Radiation Oncology          [address] [facility], [address]        Sharol Mcburney: [phone]        F: [phone]       [facility]       Radiation Oncology   [address] [facility] [address] [facility]       Sharol Mcburney: [phone]       F: [phone]       [place]      2021    Patient: Yvonne Anton   YOB: 1940       Dear Dr Larry Ji:    Thank you for referring Yvonne Anton to me for evaluation. Below are the relevant portions of my assessment and plan of care. If you have questions, please do not hesitate to call me. I look forward to following this patient along with you. Sincerely,      Electronically signed by Franca Trevino MD on 21 at 1:25 PM EST      CC: Patient Care Team:  Kiley Davalos as PCP - General (Family Medicine)  Beatriz Simental MD as Consulting Physician (Urology)  ------------------------------------------------------------------------------------------------------------------------------------------------------------------------------------------      INPATIENT RADIATION ONCOLOGY NEW PATIENT NOTE:     Date of Service: 2021    Location:  [facility],   [address] [facility]   [phone]    Patient ID:   Yvonne Anton  : 1940   MRN: [id number]    Yvonne Anton is being seen today for an oncologic opinion at the request of Dr. Larry Ji.     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 female with a history of abnormal chest x-ray done by her PCP which noted a mass in the right lower lobe lung. Patient underwent a bronchoscopy which unfortunately revealed a metastatic melanoma on October 15 2021. Patient was referred to Dr. Louisa Alfredo at [facility] oncology. Patient had a staging work-up including PET scan on November 9, 2021 which showed diffuse osseous involvement as well as involvement in the brain and lung. Patient had a MRI of the brain done on November 3, 2021 which demonstrated 3 lesions in the brain with the largest measuring 1.4 cm in the left frontal lobe. Patient denies any symptoms at this time of headaches, dizziness, numbness, weakness, confusion, or pain. She denies any chest pain, coughing, hemoptysis, abdominal pain, nausea, weight loss, or bleeding she does have some pain in her right shoulder which has exacerbated over the past few weeks though she does have osteoarthritis. She does note she has some limited range of motion in her right shoulder and arm because of the pain. She otherwise was hospitalized for PAOLA due to likely dehydration. Patient will be discharged to [facility] for help with her generalized weakness. Radiation oncology has been consulted for her brain lesions as well as her other metastatic sites and was scheduled to meet with us tomorrow patient however we saw her inpatient while she is here.     PRIOR RADIATION HISTORY:  No prior history of radiation therapy     PACEMAKER: None     PAST MEDICAL HISTORY:  Past Medical History:   Diagnosis Date    Arthritis     Asthma     Back pain     Caffeine use     2-3 cups coffee and 2-3 cups tea and 1-2 cans pop daily    Carpal tunnel syndrome     Constipation     Cystitis     Dizziness     Gout     Hearing loss     History of blood transfusion     Hypertension     Malignant melanoma (HCC)     Meralgia paresthetica     Mitral valve regurgitation     Obesity     Osteoarthritis     Osteoporosis     PONV (postoperative nausea and vomiting)     Sleep apnea     Thyroid disease     HYPOTHYROID    Urinary incontinence     Venous insufficiency     Vertigo     Weakness     Wears hearing aid     BILATERAL       PAST SURGICAL HISTORY:  Past Surgical History:   Procedure Laterality Date    CARPAL TUNNEL RELEASE Bilateral     CATARACT REMOVAL Bilateral     CATARACT REMOVAL WITH IMPLANT Bilateral     COLONOSCOPY      CYSTOSCOPY      and dilation    FOOT SURGERY Right     HYSTERECTOMY      JOINT REPLACEMENT Left 2011    TKA    KNEE ARTHROSCOPY Left     KNEE SURGERY Left     SD TOTAL KNEE ARTHROPLASTY Right 7/17/2018    KNEE TOTAL ARTHROPLASTY performed by Ambar Dillard MD at [place] [address].:  No current facility-administered medications for this encounter.     Current Outpatient Medications:     melatonin 3 MG TABS tablet, Take 1 tablet by mouth nightly as needed (sleep), Disp: , Rfl: 3    Facility-Administered Medications Ordered in Other Encounters:     cyclobenzaprine (FLEXERIL) tablet 10 mg, 10 mg, Oral, TID PRN, COBY Bose - NP, 10 mg at 11/17/21 0018    magnesium sulfate 1000 mg in dextrose 5% 100 mL IVPB, 1,000 mg, IntraVENous, PRN, COBY Hampton NP    melatonin tablet 3 mg, 3 mg, Oral, Nightly PRN, COBY Hampton - NP    heparin (porcine) injection 5,000 Units, 5,000 Units, SubCUTAneous, 3 times per day, COBY Hampton - NP, 5,000 Units at 11/17/21 0601    cetirizine (ZYRTEC) tablet 10 mg, 10 mg, Oral, Daily PRN, Karson Garcia DO    albuterol sulfate  (90 Base) MCG/ACT inhaler 2 puff, 2 puff, Inhalation, BID, Jeffry Link, APRN - CNP, 2 puff at 11/16/21 2053    allopurinol (ZYLOPRIM) tablet 300 mg, 300 mg, Oral, Daily, Jeffry Link, APRN - CNP, 300 mg at 11/17/21 1006    aspirin chewable tablet 81 mg, 81 mg, Oral, Daily, Jeffry Link, APRN - CNP, 81 mg at 11/17/21 1006    [Held by provider] folic acid-pyridoxine-cyancobalamin (FOLTX) 1.13-25-2 MG TABS 1 tablet, 1 tablet, Oral, Daily, chloride infusion, 25 mL, IntraVENous, PRN, Butterfield Rim, APRN - CNP    ondansetron (ZOFRAN-ODT) disintegrating tablet 4 mg, 4 mg, Oral, Q8H PRN **OR** ondansetron (ZOFRAN) injection 4 mg, 4 mg, IntraVENous, Q6H PRN, Butterfield Rim, APRN - CNP    ALLERGIES:   Allergies   Allergen Reactions    Povidone-Iodine Rash    Hydrochlorothiazide      Pt states she takes Maxzide at home without any problems.  Meperidine Itching    Naproxen Hives       SOCIAL HISTORY:  Social History     Socioeconomic History    Marital status:      Spouse name: Not on file    Number of children: 2    Years of education: Not on file    Highest education level: Not on file   Occupational History    Occupation: Retired   Tobacco Use    Smoking status: Former Smoker     Packs/day: 0.25     Years: 17.00     Pack years: 4.25     Quit date:      Years since quittin.9    Smokeless tobacco: Never Used   Vaping Use    Vaping Use: Never used   Substance and Sexual Activity    Alcohol use: Yes     Comment: rare    Drug use: No    Sexual activity: Never   Other Topics Concern    Not on file   Social History Narrative    Not on file     Social Determinants of Health     Financial Resource Strain:     Difficulty of Paying Living Expenses: Not on file   Food Insecurity:     Worried About [address] in the Last Year: Not on file    [address] in the Last Year: Not on file   Transportation Needs:     Lack of Transportation (Medical): Not on file    Lack of Transportation (Non-Medical):  Not on file   Physical Activity:     Days of Exercise per Week: Not on file    Minutes of Exercise per Session: Not on file   Stress:     Feeling of Stress : Not on file   Social Connections:     Frequency of Communication with Friends and Family: Not on file    Frequency of Social Gatherings with Friends and Family: Not on file    Attends Adventism Services: Not on file    Active Member of Clubs or Organizations: Not on file    Attends Club or Organization Meetings: Not on file    Marital Status: Not on file   Intimate Partner Violence:     Fear of Current or Ex-Partner: Not on file    Emotionally Abused: Not on file    Physically Abused: Not on file    Sexually Abused: Not on file   Housing Stability:     Unable to Pay for Housing in the Last Year: Not on file    Number of [place] in the Last Year: Not on file    Unstable Housing in the Last Year: Not on file       FAMILY HISTORY:  Family History   Problem Relation Age of Onset    Heart Failure Mother     Heart Disease Father        REVIEW OF SYSTEMS:    GENERAL/CONSTITUTIONAL: The patient denies fever, fatigue, weakness, weight gain or weight loss. HEENT: The patient denies pain, redness, loss of vision, double or blurred vision. The patient denies ringing in the ears, loss of hearing, hoarseness, trouble swallowing, or painful swallowing. CARDIOVASCULAR: The patient denies chest pain, irregular heartbeats, sudden changes in heartbeat or palpitation. RESPIRATORY: The patient denies shortness of breath, cough, hemoptysis. GASTROINTESTINAL: The patient denies nausea, vomiting, diarrhea, constipation, blood in the stools. GENITOURINARY: The patient denies difficult urination, pain or burning with urination, blood in the urine. MUSCULOSKELETAL: (+) R Shoulder pain. The patient denies arm, buttock, thigh or calf cramps. No joint or muscle pain. SKIN: The patient denies easy bruising, skin redness, skin rash, hives. NEUROLOGIC: The patient denies headache, dizziness, fainting, muscle spasm, loss of consciousness. ENDOCRINE: The patient denies intolerance to hot or cold temperature, flushing. HEMATOLOGIC/LYMPHATIC: The patient denies anemia, bleeding tendency or clotting tendency. ALLERGIC/IMMUNOLOGIC: The patient denies rhinitis, asthma, skin sensitivity. PYSCHOLOGIC: The patient denies any depression, anxiety, or confusion.     A full 14 point review of systems was performed and assessed and found to be negative except as noted above. PHYSICAL EXAMINATION:    CHAPERONE: Not Required    ECO Asymptomatic    VITAL SIGNS: /64   Pulse 86   Temp 98.2 °F (36.8 °C)   Resp 18   Ht 5' 3\" (1.6 m)   Wt 203 lb 4.2 oz (92.2 kg)   SpO2 96%   BMI 36.01 kg/m²   Temp (24hrs), Av.9 °F (36.6 °C), Min:97.5 °F (36.4 °C), Max:98.2 °F (36.8 °C)  GENERAL:  General appearance is that of a well-nourished, well-developed in no apparent distress. HEENT: Normocephalic, atraumatic, EOMI, moist mucosa, no erythema. NECK:  No adenopathy or a palpable thyroid mass, trachea is midline. LYMPHATICS: No cervical, supraclavicular adenopathy. HEART:  Normal rate and regular rhythm, normal heart sounds. LUNGS:  Pulmonary effort normal. Normal breath sounds. ABDOMEN:  Soft, nontender, non distended. EXTREMITIES:  No edema.  (+) R Shoulder tenderness. MSK:  No spinal tenderness. (+) Limited R arm ROM. NEUROLOGICAL: Alert and oriented. Strength and sensation intact bilaterally. No focal deficits. PSYCH: Mood normal, behavior normal.  SKIN: No erythema, no desquamation.          LABS:  WBC   Date Value Ref Range Status   2021 18.2 (H) 3.5 - 11.0 k/uL Final     Segs Absolute   Date Value Ref Range Status   2021 17.10 (H) 1.8 - 7.7 k/uL Final     Hemoglobin   Date Value Ref Range Status   2021 9.5 (L) 12.0 - 16.0 g/dL Final     Platelets   Date Value Ref Range Status   2021 352 140 - 450 k/uL Final     No results found for: , CEA  No results found for:   No results found for: PSA    IMAGIN/9/21 PET Scan (Outside records scanned into Media folder)  Reviewed -brain, bone, lung metastases    PATHOLOGY:  10/15/21 Lung Biopsy (Outside records scanned into Media folder)  Final diagnosis  Right lung biopsy:  Metastatic melanoma    ASSESSMENT AND PLAN:   Xander Feathers is a [de-identified] y.o. 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 We also reviewed their staging based on the testing that as been done and the recommendations per the NCCN guidelines. We discussed the options of treatment, including surgery, chemotherapy, and radiation, and the rationale of why radiation therapy would be indicated for this diagnosis. We also discussed that they have the option to not pursue our recommended treatment as well. We discussed that given her metastatic disease, patient is a stage IV. She does have significant pain due to her right shoulder lesion and therefore I would recommend palliative radiation there. Patient will be getting discharged from the hospital today and going to the [facility] in [place]. We therefore will refer her to a treatment center closer to her facility. Patient will also be recommended at some point get [facility] treatment for her brain metastases with gamma knife in [place]. We reviewed the logistics of radiation treatment planning, including a CT Simulation session, as well as daily treatments for approximately 1-2 weeks. With regards to radiation to the extremity, I discussed the possible short-term side effects discussed included skin irritation (causing redness, dryness, or peeling), tiredness, low blood counts (causing infection or bleeding) and swelling of the extremity. Possible long-term side effects discussed included hyperpigmentation of the skin, damage to the bone (causing fracture), damage to the nerves (causing numbness or weakness), and swelling of the extremity.     With regards to radiation to the brain, I discussed the possible short-term side effects of brain radiation which care providers and interpretation of results independently. This note is created with the assistance of a speech recognition program.  While intending to generate a document that actually reflects the content of the visit, the document can still have some errors including those of syntax and sound a like substitutions which may escape proof reading. It such instances, actual meaning can be extrapolated by contextual diversion.

## 2021-11-18 ENCOUNTER — HOSPITAL ENCOUNTER (OUTPATIENT)
Dept: RADIATION ONCOLOGY | Facility: MEDICAL CENTER | Age: 81
Discharge: HOME | End: 2021-11-18
Payer: MEDICARE

## 2021-11-18 ENCOUNTER — HOSPITAL ENCOUNTER (OUTPATIENT)
Dept: RADIATION ONCOLOGY | Age: 81
Discharge: HOME | End: 2021-11-18
Payer: MEDICARE

## 2021-11-18 DIAGNOSIS — C79.31 MALIGNANT MELANOMA METASTATIC TO BRAIN (HCC): Primary | ICD-10-CM

## 2021-11-18 LAB
CULTURE: NORMAL
CULTURE: NORMAL
Lab: NORMAL
Lab: NORMAL
SPECIMEN DESCRIPTION: NORMAL
SPECIMEN DESCRIPTION: NORMAL

## 2021-11-18 PROCEDURE — 77290 THER RAD SIMULAJ FIELD CPLX: CPT | Performed by: STUDENT IN AN ORGANIZED HEALTH CARE EDUCATION/TRAINING PROGRAM

## 2021-11-18 PROCEDURE — 77334 RADIATION TREATMENT AID(S): CPT | Performed by: STUDENT IN AN ORGANIZED HEALTH CARE EDUCATION/TRAINING PROGRAM

## 2021-11-18 PROCEDURE — 99212 OFFICE O/P EST SF 10 MIN: CPT | Performed by: STUDENT IN AN ORGANIZED HEALTH CARE EDUCATION/TRAINING PROGRAM

## 2021-11-18 PROCEDURE — 77263 THER RADIOLOGY TX PLNG CPLX: CPT | Performed by: STUDENT IN AN ORGANIZED HEALTH CARE EDUCATION/TRAINING PROGRAM

## 2021-11-18 NOTE — DISCHARGE INSTR - COC
Continuity of Care Form    Patient Name: Alo Malave   :  1940  MRN:  [id number]    Admit date:  (Not on file)  Discharge date:  ***    Code Status Order: Prior   Advance Directives:      Admitting Physician:  No admitting provider for patient encounter. PCP: Ella Burnett    Discharging Nurse: [facility] Unit/Room#: No information available for this encounter.   Discharging Unit Phone Number: ***    Emergency Contact:   Extended Emergency Contact Information  Primary Emergency Contact: Jyoti Clinton, [address] Phone: [phone]  Mobile Phone: [phone]  Relation: Child  Secondary Emergency Contact: Alisa Machuca [address] Phone: [phone]  Mobile Phone: [phone]  Relation: Child    Past Surgical History:  Past Surgical History:   Procedure Laterality Date    CARPAL TUNNEL RELEASE Bilateral     CATARACT REMOVAL Bilateral     CATARACT REMOVAL WITH IMPLANT Bilateral     COLONOSCOPY      CYSTOSCOPY      and dilation    FOOT SURGERY Right     HYSTERECTOMY      JOINT REPLACEMENT Left     TKA    KNEE ARTHROSCOPY Left     KNEE SURGERY Left     TX TOTAL KNEE ARTHROPLASTY Right 2018    KNEE TOTAL ARTHROPLASTY performed by Ambar Dillard MD at [address] [facility]         Immunization History:   Immunization History   Administered Date(s) Administered    COVID-19, ANAHI Urbina, 30mcg/0.3mL 2021, 2021       Active Problems:  Patient Active Problem List   Diagnosis Code    Urgency of urination R39.15    Anemia D64.9    Apnea R06.81    Arthropathia M12.9    Benign essential HTN I10    Chronic renal impairment N18.9    DDD (degenerative disc disease), lumbar M51.36    Disorder of bone and articular cartilage M89.9, M94.9    Disorder of lipid metabolism E78.9    Edema R60.9    Shortness of breath R06.02    Enthesopathy of hip M76.899    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, 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    Gout M10.9    Gouty arthropathy M10.9    Hyponatremia E87.1    Hypoactive thyroid E03.9    Elevated fasting blood sugar R73.01    Inflammation of sacroiliac joint (HCC) M46.1    Insomnia G47.00    Iron deficiency anemia D50.9    Primary localized osteoarthritis M19.91    Arthritis, degenerative, localized, primary, hand M19.049    Lumbosacral neuritis M54.17    Increased MCV D75.89    Menopausal symptom N95.1    Obstructive apnea G47.33    Arthritis of knee, degenerative M17.10    Osteoporosis M81.0    Urinary incontinence R32    Varicose veins of lower extremity I83.90    Chronic low back pain M54.50, G89.29    Lumbar disc herniation with radiculopathy M51.16    Acquired spondylolisthesis M43.10    Lumbar stenosis with neurogenic claudication M48.062    Lumbar radicular pain M54.16    Coagulopathy (HCC) D68.9    Generalized weakness R53.1    Acute cystitis without hematuria N30.00    UTI due to Klebsiella and Enterobacter N39.0, B96.89    Obesity (BMI 30-39. 9) E66.9    Mass of right lung R91.8    Malignant neoplasm of lung (HCC) C34.90    Chronic pain of both shoulders M25.511, G89.29, M25.512    Malignant melanoma metastatic to brain [facility]) C79.31    Pneumonia of both lower lobes due to infectious organism J18.9    Elevated LFTs R79.89    Transaminitis R74.01       Isolation/Infection:   Isolation            No Isolation          Patient Infection Status       Infection Onset Added Last Indicated Last Indicated By Review Planned Expiration Resolved Resolved By    None active    Resolved    COVID-19 (Rule Out) 11/13/21 11/13/21 11/15/21 Respiratory Panel, Molecular, with COVID-19 (Restricted: peds pts or suitable admitted adults) (Ordered)   11/15/21 Rule-Out Test Resulted            Nurse Assessment:  Last Vital Signs: There were no vitals taken for this visit.     Last documented pain score (0-10 scale):    Last Weight:   Wt Readings from Last 1 Encounters:   11/17/21 203 lb 4.2 oz (92.2 kg) Mental Status:  {IP PT MENTAL STATUS:}    IV Access:  { LYDIA IV ACCESS:830594674}    Nursing Mobility/ADLs:  Walking   {CHP DME ODKA:523932037}  Transfer  {CHP DME LKJJ:932266228}  Bathing  {CHP DME PXPH:301065662}  Dressing  {CHP DME WCD}  Toileting  {CHP DME RDBT:824543244}  Feeding  {P DME WEKE:241656416}  Med Admin  {P DME IQGF:143444995}  Med Delivery   { LYDIA MED Delivery:692538893}    Wound Care Documentation and Therapy:  Incision 18 Knee Right (Active)   Number of days: 1220        Elimination:  Continence: Bowel: {YES / ZT:47745}  Bladder: {YES / FA:05147}  Urinary Catheter: {Urinary Catheter:843816459}   Colostomy/Ileostomy/Ileal Conduit: {YES / QK:09218}       Date of Last BM: ***  No intake or output data in the 24 hours ending 21 1017  No intake/output data recorded.     Safety Concerns:     [address] [employer] Safety Concerns:778664088}    Impairments/Disabilities:      [address] [employer] Impairments/Disabilities:597996481}    Nutrition Therapy:  Current Nutrition Therapy:   [address] [employer] Diet List:518739899}    Routes of Feeding: {CHP DME Other Feedings:116515314}  Liquids: {Slp liquid thickness:30206}  Daily Fluid Restriction: {CHP DME Yes amt example:012875828}  Last Modified Barium Swallow with Video (Video Swallowing Test): {Done Not Done TPKC:097105563}    Treatments at the Time of Hospital Discharge:   Respiratory Treatments: ***  Oxygen Therapy:  {Therapy; copd oxygen:76703}  Ventilator:    {[facility] Vent IIZZ:407684710}    Rehab Therapies: {THERAPEUTIC INTERVENTION:8106535699}  Weight Bearing Status/Restrictions: [address] Weight Bearin}  Other Medical Equipment (for information only, NOT a DME order):  {EQUIPMENT:739286244}  Other Treatments: ***    Patient's personal belongings (please select all that are sent with patient):  {[facility] DME Belongings:843995505}    RN SIGNATURE:  {Esignature:446948470}    CASE MANAGEMENT/SOCIAL WORK SECTION    Inpatient Status Date: ***    Readmission Risk Assessment Score:  Readmission Risk              Risk of Unplanned Readmission:  0           Discharging to Facility/ Agency   Name:   Address:  Phone:  Fax:    Dialysis Facility (if applicable)   Name:  Address:  Dialysis Schedule:  Phone:  Fax:    / signature: {Evelinegnature:096825397}    PHYSICIAN SECTION    Prognosis: {Prognosis:4688686881}    Condition at Discharge: [address]ley Patient Condition:912349053}    Rehab Potential (if transferring to Rehab): {Prognosis:0780774276}    Recommended Labs or Other Treatments After Discharge: ***    Physician Certification: I certify the above information and transfer of Luke Blake  is necessary for the continuing treatment of the diagnosis listed and that she requires {Admit to Appropriate Level of Care:86254} for {GREATER/LESS:222508197} 30 days.      Update Admission H&P: {CHP DME Changes in [facility]:106306573}    PHYSICIAN SIGNATURE:  {Esignature:445228078}

## 2021-11-18 NOTE — PROGRESS NOTES
Patient was seen prior to [address] today with daughter Edith Kat present. See consultation from Dr. Miracle Funk from yesterday for full details. 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 She has severe right shoulder pain. Palliative radiation to the right shoulder/ humerus was offered. The radiation logistics and potential side effects were discussed. This included a discussion about the possibility for fatigue, skin irritation. , or bone fracture, as a consequence of the radiation. Informed consent was obtain verbally from the patient and written from the daughter (the healthcare proxy). I plan to treat the right humerus with a single session of radiation (due to the patient's poor performance status). We will coordinate a single session of Gamma Knife radiosurgery at our [facility] location in the coming weeks. She has follow-up with medical oncology to discuss systemic treatment. The patient's daughter and family is considering hospice, however. Total time spent on this case on the day of encounter is more than 15 minutes. This time includes combination of one or more of the following - review of necessary tests, review of pertinent medical records from the EMR, performing medically appropriate examination and evaluation, counseling and educating the patient/family/caregiver, ordering necessary medical tests, procedures etc., documenting the clinical information in the electronic medical record, care coordination, referring and communicating with other health care providers and interpretation of results independently.

## 2021-11-19 ENCOUNTER — HOSPITAL ENCOUNTER (OUTPATIENT)
Dept: RADIATION ONCOLOGY | Facility: MEDICAL CENTER | Age: 81
Discharge: HOME | End: 2021-11-19
Payer: MEDICARE

## 2021-11-19 PROCEDURE — 77307 TELETHX ISODOSE PLAN CPLX: CPT | Performed by: STUDENT IN AN ORGANIZED HEALTH CARE EDUCATION/TRAINING PROGRAM

## 2021-11-19 PROCEDURE — 77334 RADIATION TREATMENT AID(S): CPT | Performed by: STUDENT IN AN ORGANIZED HEALTH CARE EDUCATION/TRAINING PROGRAM

## 2021-11-22 ENCOUNTER — TELEPHONE (OUTPATIENT)
Dept: ONCOLOGY | Age: 81
End: 2021-11-22

## 2021-11-23 ENCOUNTER — HOSPITAL ENCOUNTER (OUTPATIENT)
Dept: RADIATION ONCOLOGY | Facility: MEDICAL CENTER | Age: 81
Discharge: HOME | End: 2021-11-23
Payer: MEDICARE

## 2021-11-23 VITALS
BODY MASS INDEX: 36.31 KG/M2 | SYSTOLIC BLOOD PRESSURE: 118 MMHG | WEIGHT: 205 LBS | TEMPERATURE: 97.9 F | OXYGEN SATURATION: 96 % | RESPIRATION RATE: 18 BRPM | DIASTOLIC BLOOD PRESSURE: 51 MMHG | HEART RATE: 75 BPM

## 2021-11-23 DIAGNOSIS — C79.31 MALIGNANT MELANOMA METASTATIC TO BRAIN (HCC): Primary | ICD-10-CM

## 2021-11-23 PROCEDURE — 77280 THER RAD SIMULAJ FIELD SMPL: CPT | Performed by: STUDENT IN AN ORGANIZED HEALTH CARE EDUCATION/TRAINING PROGRAM

## 2021-11-23 PROCEDURE — 77431 RADIATION THERAPY MANAGEMENT: CPT | Performed by: STUDENT IN AN ORGANIZED HEALTH CARE EDUCATION/TRAINING PROGRAM

## 2021-11-23 PROCEDURE — 77412 RADIATION TX DELIVERY LVL 3: CPT | Performed by: STUDENT IN AN ORGANIZED HEALTH CARE EDUCATION/TRAINING PROGRAM

## 2021-11-23 PROCEDURE — G6002 STEREOSCOPIC X-RAY GUIDANCE: HCPCS | Performed by: STUDENT IN AN ORGANIZED HEALTH CARE EDUCATION/TRAINING PROGRAM

## 2021-11-23 PROCEDURE — 77387 GUIDANCE FOR RADJ TX DLVR: CPT | Performed by: STUDENT IN AN ORGANIZED HEALTH CARE EDUCATION/TRAINING PROGRAM

## 2021-11-23 RX ORDER — BUMETANIDE 1 MG/1
0.5 TABLET ORAL DAILY
COMMUNITY

## 2021-11-23 NOTE — PROGRESS NOTES
Radiation Oncology On-Treatment Visit:     Fraction # 1 /  (8 Gy)    Diagnosis:  Ms. Sofia Tang is a [de-identified] y.o. 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 She has severe right shoulder pain. Treatment course: Palliative radiation to the right shoulder/ humerus    Progress note:  Ms. Sofia Tang was seen and evaluated. Patient has not noticed any notable side effects of radiation. Her pain in her right shoulder persists. She is getting accustomed to her care facility and has more energy than last visit. She has significant lower extremity edema. Physical exam:   VITAL SIGNS: BP (!) 118/51   Pulse 75   Temp 97.9 °F (36.6 °C) (Oral)   Resp 18   Wt 205 lb (93 kg)   SpO2 96%   BMI 36.31 kg/m²   ECO Symptomatic, <50% in bed during the day   General: Elderly female in wheelchair, pleasant. Plan:  · Completed palliative course of radiation to right humerus. Patient to be scheduled for [facility] radiosurgery in the coming weeks for the brain lesions (asymptomatic). Patient currently on steroids at the direction of her medical oncologist - will defer to Dr. Larry Ji regarding a taper. She is meeting with her tomorrow to discuss systemic therapy or other options for her metastatic melanoma. Will refer to lymphedema for lower extremity edema. · I have checked the imaging performed to date, which confirm appropriate positioning.

## 2021-11-23 NOTE — PROGRESS NOTES
Luke Blake  11/23/2021  Wt Readings from Last 3 Encounters:   11/23/21 205 lb (93 kg)   11/18/21 203 lb (92.1 kg)   11/17/21 203 lb 4.2 oz (92.2 kg)     Body mass index is 36.31 kg/m². Treatment Area: right shoulder    Patient was seen today for weekly visit. Comfort Alteration  Fatigue: Severe      Nutritional Alteration  Anorexia: poor appetite    Nausea: No   Vomiting: No       Elimination Alterations  Constipation: no  Diarrhea:  no    Skin Alteration   Sensation: no concerns     Radiation Dermatitis:  Intact [x]     Erythema  []     Discoloration  []     Rash []     Dry desquamation  []     Moist desquamation []       Emotional  Coping: somewhat effective      Injury, potential bleeding or infection: no current issues     Lab Results   Component Value Date    WBC 18.2 (H) 11/14/2021     11/14/2021         BP (!) 118/51   Pulse 75   Temp 97.9 °F (36.6 °C) (Oral)   Resp 18   Wt 205 lb (93 kg)   SpO2 96%   BMI 36.31 kg/m²   Patient Currently in Pain: Denies               Assessment/Plan: Patient was seen today for weekly visit. One treatment to right shoulder today. Pt will follow up in [place] for Gamma Knife. Spoek with [facility] and they are looking at Dec 20th. Pt's daughter was given tentative date and will await a call from [place]. Dr. Blanca Briseno updated and examined pt.       Matthew Jolley RN

## 2021-11-24 ENCOUNTER — TELEPHONE (OUTPATIENT)
Dept: RADIATION ONCOLOGY | Age: 81
End: 2021-11-24

## 2021-11-24 DIAGNOSIS — C79.31 MELANOMA METASTATIC TO BRAIN (HCC): Primary | ICD-10-CM

## 2021-11-24 PROCEDURE — 77336 RADIATION PHYSICS CONSULT: CPT | Performed by: STUDENT IN AN ORGANIZED HEALTH CARE EDUCATION/TRAINING PROGRAM

## 2021-11-24 NOTE — TELEPHONE ENCOUNTER
Prior auth approved for gamma knife tx. I contacted patient daughter Gillian Lezama with appointments for 12/20/21 @845am.  Gillian Amezquitaadelfos states she would call the facility they will transport patient for appointment.

## 2021-12-01 ENCOUNTER — TELEPHONE (OUTPATIENT)
Dept: RADIATION ONCOLOGY | Age: 81
End: 2021-12-01

## 2021-12-01 NOTE — TELEPHONE ENCOUNTER
Incoming call from Anuj Vivar, advising that pts daughter called stating pt has passed away. Gamma Knife tx appts and MRI cancelled.

## (undated) DEVICE — Z DISCONTINUED PER MEDLINE USE 2741943 DRESSING AQUACEL 10 IN ALG W9XL25CM SIL CVR WTRPRF VIR BACT BARR ANTIMIC

## (undated) DEVICE — Device

## (undated) DEVICE — SUTURE VCRL SZ 0 L36IN ABSRB UD CT-1 L36MM 1/2 CIR TAPR PNT VCP946H

## (undated) DEVICE — DUP USE 310781 BLADE SAW SAG 29MMX83.7MMX1.32MM

## (undated) DEVICE — COVER,MAYO STAND,XL,STERILE: Brand: MEDLINE

## (undated) DEVICE — Z INACTIVE USE 2660664 SOLUTION IRRIG 3000ML 0.9% SOD CHL USP UROMATIC PLAS CONT

## (undated) DEVICE — STERILE PATIENT PROTECTIVE PAD FOR IMP® KNEE POSITIONERS & COHESIVE WRAP (10 / CASE): Brand: DE MAYO KNEE POSITIONER®

## (undated) DEVICE — SUTURE STRATAFIX SYMMETRIC PDS + SZ 1 L18IN ABSRB VLT L48MM SXPP1A400

## (undated) DEVICE — SKIN AFFIX SURG ADHESIVE 72/CS 0.55ML: Brand: MEDLINE

## (undated) DEVICE — 3M™ WARMING BLANKET, UPPER BODY, 10 PER CASE, 42268: Brand: BAIR HUGGER™

## (undated) DEVICE — KIT SEP W/ BLD DRAW TB SYR NDL TRNQT PD

## (undated) DEVICE — KIT AUTOTRNS APPL AERO 2 SET SYR 2 TIP FOR PLT SEP SYS GPS

## (undated) DEVICE — 4-PORT MANIFOLD: Brand: NEPTUNE 2

## (undated) DEVICE — SOLUTION IV IRRIG POUR BRL 0.9% SODIUM CHL 2F7124

## (undated) DEVICE — SUTURE STRATAFIX SPRL MCRYL + SZ 2 0 L27IN ABSRB UD W NDL SXMP1B419

## (undated) DEVICE — DUAL CUT SAGITTAL BLADE

## (undated) DEVICE — GLOVE SURG SZ 8 L12IN FNGR THK13MIL BRN LTX SYN POLYMER W

## (undated) DEVICE — GLOVE SURG SZ 85 STD WHT LTX SYN POLYMER BEAD REINF ANTI RL

## (undated) DEVICE — SOLUTION IV IRRIG WATER 1000ML POUR BRL 2F7114

## (undated) DEVICE — 3M™ STERI-STRIP™ REINFORCED ADHESIVE SKIN CLOSURES, R1548, 1 IN X 5 IN (25 MM X 125 MM), 4 STRIPS/ENVELOPE: Brand: 3M™ STERI-STRIP™

## (undated) DEVICE — COOLER THER 20-31IN L CRYO COMB W/ PD KNEE TB GRAV FLO

## (undated) DEVICE — CEMENT MIXING SYSTEM WITH FEMORAL BREAKWAY NOZZLE: Brand: REVOLUTION

## (undated) DEVICE — SET HNDPC W COAX BNE CLN TIP SUCT TB BTTRY PWR DISPOSABLE

## (undated) DEVICE — COVER,TABLE,HEAVY DUTY,50"X90",STRL: Brand: MEDLINE

## (undated) DEVICE — SYSTEM COMPRESS THERAPY CUFF ONLY LG

## (undated) DEVICE — DRAPE,REIN 53X77,STERILE: Brand: MEDLINE